# Patient Record
Sex: FEMALE | Race: WHITE | NOT HISPANIC OR LATINO | Employment: UNEMPLOYED | URBAN - METROPOLITAN AREA
[De-identification: names, ages, dates, MRNs, and addresses within clinical notes are randomized per-mention and may not be internally consistent; named-entity substitution may affect disease eponyms.]

---

## 2017-02-13 ENCOUNTER — TELEPHONE (OUTPATIENT)
Dept: FAMILY MEDICINE | Facility: CLINIC | Age: 22
End: 2017-02-13

## 2017-02-13 NOTE — TELEPHONE ENCOUNTER
Reason for call:  Patient reporting a symptom    Symptom or request: allergies, hives    Duration (how long have symptoms been present): yesterday     Have you been treated for this before? No    Additional comments: pt calling stating she is having a allergy attack and has broken out in hives. She has tried benadryl allergy and itching cream and it hasn't helped.     Phone Number patient can be reached at:  Home number on file [phone] (home)    Best Time:  any    Can we leave a detailed message on this number:  YES    Call taken on 2/13/2017 at 2:17 PM by Mariah Manriquez

## 2017-02-13 NOTE — TELEPHONE ENCOUNTER
Pt was given information below from provider   Pt verbalized understanding and had no further questions at this time.   Encounter closed.   Natasha WATTS RN

## 2017-02-13 NOTE — TELEPHONE ENCOUNTER
Pt thinks she is having an allergic action to ibuprofen  Please advise   Thank you     Ibuprofen -1 pill -  last dose night before reaction (Saturday 2/11/17)  Sunday 2/12/17 broke out around 10 am with an itchy scalp.   Symptoms: rash all over body, itchy, burning in some areas.   Hx: 1 time before but it was from cherries  Denies: SOB, pain in chest or arm.     Pt is using benadryl - last dose 5 hours ago (9:30am)  - 1 pill 25 mg each.

## 2017-02-18 ENCOUNTER — HOSPITAL ENCOUNTER (EMERGENCY)
Facility: CLINIC | Age: 22
Discharge: HOME | End: 2017-02-18
Attending: PHYSICIAN ASSISTANT | Admitting: PHYSICIAN ASSISTANT
Payer: COMMERCIAL

## 2017-02-18 VITALS
HEIGHT: 63 IN | SYSTOLIC BLOOD PRESSURE: 111 MMHG | HEART RATE: 98 BPM | DIASTOLIC BLOOD PRESSURE: 67 MMHG | TEMPERATURE: 98.3 F | BODY MASS INDEX: 30.12 KG/M2 | OXYGEN SATURATION: 98 % | RESPIRATION RATE: 16 BRPM | WEIGHT: 170 LBS

## 2017-02-18 DIAGNOSIS — R05.9 COUGH: ICD-10-CM

## 2017-02-18 PROCEDURE — 99213 OFFICE O/P EST LOW 20 MIN: CPT | Performed by: PHYSICIAN ASSISTANT

## 2017-02-18 PROCEDURE — 99212 OFFICE O/P EST SF 10 MIN: CPT

## 2017-02-18 RX ORDER — BENZONATATE 100 MG/1
100 CAPSULE ORAL 3 TIMES DAILY PRN
Qty: 42 CAPSULE | Refills: 0 | Status: SHIPPED | OUTPATIENT
Start: 2017-02-18 | End: 2017-07-21

## 2017-02-18 RX ORDER — PREDNISONE 20 MG/1
TABLET ORAL
Qty: 10 TABLET | Refills: 0 | Status: SHIPPED | OUTPATIENT
Start: 2017-02-18 | End: 2017-03-28

## 2017-02-18 NOTE — ED AVS SNAPSHOT
[facility] Emergency Department    [address] [address]    Phone:  [phone]    Fax:  [phone]                                       Deborah Swartz   MRN: [id number]    Department:  [facility] Emergency Department   Date of Visit:  2/18/2017           After Visit Summary Signature Page     I have received my discharge instructions, and my questions have been answered. I have discussed any challenges I see with this plan with the nurse or doctor.    ..........................................................................................................................................  Patient/Patient Representative Signature      ..........................................................................................................................................  Patient Representative Print Name and Relationship to Patient    ..................................................               ................................................  Date                                            Time    ..........................................................................................................................................  Reviewed by Signature/Title    ...................................................              ..............................................  Date                                                            Time

## 2017-02-18 NOTE — ED AVS SNAPSHOT
[facility] Emergency Department    [address] [address]    Phone:  [phone]    Fax:  [phone]                                       Deborah Swartz   MRN: [id number]    Department:  [facility] Emergency Department   Date of Visit:  2/18/2017           Patient Information     Date Of Birth          1995        Your diagnoses for this visit were:     Cough        You were seen by Reina Lindsay PA-C.      Follow-up Information     Follow up with CELIA Mcbride MD Today.    Specialty:  Family Practice    Why:  As needed, If symptoms worsen    Contact information:    [facility]  [address] [address] [address]  [phone]        Discharge References/Attachments     BRONCHITIS, ACUTE (ENGLISH)      24 Hour Appointment Hotline       To make an appointment at any [facility] clinic, call [phone]). If you don't have a family doctor or clinic, we will help you find one. [facility] clinics are conveniently located to serve the needs of you and your family.             Review of your medicines      START taking        Dose / Directions Last dose taken    benzonatate 100 MG capsule   Commonly known as:  TESSALON   Dose:  100 mg   Quantity:  42 capsule        Take 1 capsule (100 mg) by mouth 3 times daily as needed for cough   Refills:  0        predniSONE 20 MG tablet   Commonly known as:  DELTASONE   Quantity:  10 tablet        Take two tablets (= 40mg) each day for 5 (five) days   Refills:  0          Our records show that you are taking the medicines listed below. If these are incorrect, please call your family doctor or clinic.        Dose / Directions Last dose taken    albuterol 108 (90 BASE) MCG/ACT Inhaler   Commonly known as:  PROAIR HFA/PROVENTIL HFA/VENTOLIN HFA   Dose:  2 puff   Quantity:  1 Inhaler        Inhale 2 puffs into the lungs every 4 hours as needed for shortness of breath / dyspnea or wheezing   Refills:  1         "cyclobenzaprine 10 MG tablet   Commonly known as:  FLEXERIL   Dose:  10 mg   Quantity:  40 tablet        Take 1 tablet (10 mg) by mouth 3 times daily as needed for muscle spasms   Refills:  01        diclofenac 75 MG EC tablet   Commonly known as:  VOLTAREN   Dose:  75 mg   Quantity:  60 tablet        Take 1 tablet (75 mg) by mouth 2 times daily   Refills:  1        sulfamethoxazole-trimethoprim 800-160 MG per tablet   Commonly known as:  BACTRIM DS/SEPTRA DS   Dose:  1 tablet   Quantity:  14 tablet        Take 1 tablet by mouth 2 times daily   Refills:  0        TYLENOL PO   Dose:  500 mg        Take 500 mg by mouth every 8 hours as needed for mild pain or fever   Refills:  0                Prescriptions were sent or printed at these locations (2 Prescriptions)                   [facility] [address] [place] [address] [address]    [address] [facility] [address]    Telephone:  [phone]   Fax:  [phone]   Hours:                  E-Prescribed (2 of 2)         predniSONE (DELTASONE) 20 MG tablet               benzonatate (TESSALON) 100 MG capsule                Orders Needing Specimen Collection     None      Pending Results     No orders found from 2/16/2017 to 2/19/2017.            Pending Culture Results     No orders found from 2/16/2017 to 2/19/2017.             Test Results from your hospital stay            Thank you for choosing [facility]       Thank you for choosing [facility] for your care. Our goal is always to provide you with excellent care. Hearing back from our patients is one way we can continue to improve our services. Please take a few minutes to complete the written survey that you may receive in the mail after you visit with us. Thank you!        [employer] Information     [employer] lets you send messages to your doctor, view your test results, renew your prescriptions, schedule appointments and more. To sign up, go to [email or web address] . Click on \"Log in\" on the left side of " "the screen, which will take you to the Welcome page. Then click on \"Sign up Now\" on the right side of the page.     You will be asked to enter the access code listed below, as well as some personal information. Please follow the directions to create your username and password.     Your access code is: [email or web address]  Expires: 3/28/2017 12:10 PM     Your access code will  in 90 days. If you need help or a new code, please call your [facility] clinic or [phone].        Care EveryWhere ID     This is your Care EveryWhere ID. This could be used by other organizations to access your [facility] medical records  [id number]        After Visit Summary       This is your record. Keep this with you and show to your community pharmacist(s) and doctor(s) at your next visit.                  "

## 2017-02-18 NOTE — ED PROVIDER NOTES
History     Chief Complaint   Patient presents with     Cough     off and on X2 weeks     HPI  Deborah Swartz is a 21 year old female who presents to  with concerns over cough.  Patient states that she initially became ill approximately two weeks ago with nasal congestion with cough.  She states she was initially improving until she had what she suspected was an allergic reaction to doctor pepper cola one week ago.  She developed hives which resolved with benadryl, epsom salt soaks one week ago and since then cough has been more severe.  She denies any fever, chills, myalgias, shortness of breath or wheezing, however does complain of some chest tightness.  She does have a history of infrequent asthma and has attempting to treat her symptoms with albuterol inhaler twice daily for the last several days. She is a pack a week smoker for the last three years.      Past Medical History   Diagnosis Date     Papanicolaou smear of cervix with atypical squamous cells of undetermined significance (ASC-US) 9/22/16          No current facility-administered medications on file prior to encounter.   Current Outpatient Prescriptions on File Prior to Encounter:  Acetaminophen (TYLENOL PO) Take 500 mg by mouth every 8 hours as needed for mild pain or fever   sulfamethoxazole-trimethoprim (BACTRIM DS/SEPTRA DS) 800-160 MG per tablet Take 1 tablet by mouth 2 times daily   albuterol (PROAIR HFA, PROVENTIL HFA, VENTOLIN HFA) 108 (90 BASE) MCG/ACT inhaler Inhale 2 puffs into the lungs every 4 hours as needed for shortness of breath / dyspnea or wheezing   diclofenac (VOLTAREN) 75 MG EC tablet Take 1 tablet (75 mg) by mouth 2 times daily   cyclobenzaprine (FLEXERIL) 10 MG tablet Take 1 tablet (10 mg) by mouth 3 times daily as needed for muscle spasms      I have reviewed the Medications, Allergies, Past Medical and Surgical History, and Social History in the Epic system.    Review of Systems  CONSTITUTIONAL:NEGATIVE for fever, chills,  "change in weight  INTEGUMENTARY/SKIN: POSITIVE for resolved rash NEGATIVE for current skin complaints   EYES: NEGATIVE for vision changes or irritation  ENT/MOUTH: POSITIVE for nasal congestion and NEGATIVE for sore throat,ear pain   RESP:POSITIVE for cough and chest tightness NEGATIVE for shortness of breath or wheezing   GI: NEGATIVE for abdominal pain, diarrhea, nausea and vomiting  Physical Exam   /67  Pulse 98  Temp 98.3  F (36.8  C) (Oral)  Resp 16  Ht 1.6 m (5' 3\")  Wt 77.1 kg (170 lb)  LMP 02/12/2017  SpO2 98%  BMI 30.11 kg/m2  Physical Exam   GENERAL APPEARANCE: healthy, alert and no distress  EYES: EOMI,  PERRL, conjunctiva clear  HENT: ear canals and TM's normal.  Nasal mucosa edematous.  Posterior pharynx non-erythematous,non-edematous without exudate.    NECK: supple, nontender, no lymphadenopathy  RESP: lungs clear to auscultation - no rales, rhonchi or wheezes  CV: regular rates and rhythm, normal S1 S2, no murmur noted  SKIN: no suspicious lesions or rashes  ED Course     ED Course     Procedures        Critical Care time:  none             Labs Ordered and Resulted from Time of ED Arrival Up to the Time of Departure from the ED - No data to display    Assessments & Plan (with Medical Decision Making)     I have reviewed the nursing notes.    I have reviewed the findings, diagnosis, plan and need for follow up with the patient.    Discharge Medication List as of 2/18/2017  2:35 PM      START taking these medications    Details   predniSONE (DELTASONE) 20 MG tablet Take two tablets (= 40mg) each day for 5 (five) days, Disp-10 tablet, R-0, E-Prescribe      benzonatate (TESSALON) 100 MG capsule Take 1 capsule (100 mg) by mouth 3 times daily as needed for cough, Disp-42 capsule, R-0, E-Prescribe           Final diagnoses:   Cough     21 year old female presents to  with concern over two week history of cough.  She had stable vital signs upon arrival.  Physical exam findings as described above.  " Differential for her symptoms is broad and includes viral URI, bronchitis, sinusitis, allergy mediated reaction, asthma exacerbation.  I do not suspect pneumonia, pertussis, PE and will defer further evaluation at this time.  Patient was discharged home with symptomatic treatment with prednisone and tessalon.  Shew as instructed to continue albuterol inhaler as needed.  Follow up with PCP if no improvement in 5-7 days.  Worrisome reasons to return to ER/UC sooner discussed.      Disclaimer: This note consists of symbols derived from keyboarding, dictation, and/or voice recognition software. As a result, there may be errors in the script that have gone undetected.  Please consider this when interpreting information found in the chart.    2/18/2017   [facility] EMERGENCY DEPARTMENT     Reina Lindsay PA-C  02/26/17 1034

## 2017-03-13 ENCOUNTER — OFFICE VISIT (OUTPATIENT)
Dept: FAMILY MEDICINE | Facility: CLINIC | Age: 22
End: 2017-03-13
Payer: COMMERCIAL

## 2017-03-13 VITALS
HEART RATE: 80 BPM | SYSTOLIC BLOOD PRESSURE: 104 MMHG | HEIGHT: 63 IN | TEMPERATURE: 98.8 F | BODY MASS INDEX: 30.3 KG/M2 | DIASTOLIC BLOOD PRESSURE: 64 MMHG | WEIGHT: 171 LBS

## 2017-03-13 DIAGNOSIS — F17.200 TOBACCO USE DISORDER: ICD-10-CM

## 2017-03-13 DIAGNOSIS — J20.9 BRONCHITIS WITH BRONCHOSPASM: Primary | ICD-10-CM

## 2017-03-13 DIAGNOSIS — J45.20 INTERMITTENT ASTHMA, UNCOMPLICATED: ICD-10-CM

## 2017-03-13 PROCEDURE — 99214 OFFICE O/P EST MOD 30 MIN: CPT | Performed by: FAMILY MEDICINE

## 2017-03-13 RX ORDER — AZITHROMYCIN 250 MG/1
TABLET, FILM COATED ORAL
Qty: 6 TABLET | Refills: 0 | Status: SHIPPED | OUTPATIENT
Start: 2017-03-13 | End: 2017-03-28

## 2017-03-13 RX ORDER — PREDNISONE 20 MG/1
20 TABLET ORAL 2 TIMES DAILY
Qty: 14 TABLET | Refills: 0 | Status: SHIPPED | OUTPATIENT
Start: 2017-03-13 | End: 2017-03-20

## 2017-03-13 RX ORDER — ALBUTEROL SULFATE 90 UG/1
2 AEROSOL, METERED RESPIRATORY (INHALATION) EVERY 4 HOURS PRN
Qty: 1 INHALER | Refills: 1 | Status: SHIPPED | OUTPATIENT
Start: 2017-03-13 | End: 2018-04-13

## 2017-03-13 RX ORDER — INHALER, ASSIST DEVICES
1 SPACER (EA) MISCELLANEOUS EVERY 4 HOURS PRN
Qty: 1 EACH | Refills: 0 | Status: SHIPPED | OUTPATIENT
Start: 2017-03-13 | End: 2018-02-12

## 2017-03-13 NOTE — PROGRESS NOTES
Subjective:  Deborah Swartz is a 21 year old female   Chief Complaint   Patient presents with     URI     X 1 month ongoing cough pt. was seen in  2/18/2017.     At that time she was given a short rest of prednisone and encouraged to use her inhaler. It helped temporarily but did not clear up her symptoms completely and she has had an ongoing cough ever since. It seems worse in the last few days so that the cough is disrupting her sleep. Occasionally she does have some productive sputum. Unfortunately, she has not been able to quit smoking        Encounter Diagnoses   Name Primary?     Bronchitis with bronchospasm Yes     Intermittent asthma, uncomplicated      Tobacco use disorder         ROS: 5 point ROS neg other than the symptoms noted above in the HPI.      Medical, surgical, social, and family histories, medications and allergies reviewed and updated.    Objective:  Exam:    GENERAL APPEARANCE ADULT: Alert, no acute distress  EYES: PERRL, EOM normal, conjunctiva and lids normal  HENT: Ears and TMs normal, oral mucosa and posterior oropharynx normal  NECK: No adenopathy,masses or thyromegaly  RESP: expiratory wheezes, mild  CV: normal rate, regular rhythm, no murmur or gallop      ASSESSMENT:  1. Bronchitis with bronchospasm    2. Intermittent asthma, uncomplicated    3. Tobacco use disorder        PLAN:  Orders Placed This Encounter     Asthma Action Plan (AAP)     predniSONE (DELTASONE) 20 MG tablet     azithromycin (ZITHROMAX) 250 MG tablet     albuterol (PROAIR HFA/PROVENTIL HFA/VENTOLIN HFA) 108 (90 BASE) MCG/ACT Inhaler     Spacer/Aero-Holding Chambers (OPTIHALER) VADIM   Discussed how to take the medication(s), expected outcomes, potential side effects.     Explained that the optihaler may improve the effectiveness of her inhaler    Patient Instructions   Take the prednisone with food to avoid stomach distress. If it causes significant insomnia, stop early and just go with the albuterol.

## 2017-03-13 NOTE — PATIENT INSTRUCTIONS
Take the prednisone with food to avoid stomach distress. If it causes significant insomnia, stop early and just go with the albuterol.

## 2017-03-13 NOTE — MR AVS SNAPSHOT
"              After Visit Summary   3/13/2017    Deborah Swartz    MRN: [id number]           Patient Information     Date Of Birth          1995        Visit Information        Provider Department      3/13/2017 8:40 AM CELIA Mcbride MD [facility]        Today's Diagnoses     Bronchitis with bronchospasm    -  1    Intermittent asthma, uncomplicated          Care Instructions    Take the prednisone with food to avoid stomach distress. If it causes significant insomnia, stop early and just go with the albuterol.         Follow-ups after your visit        Who to contact     If you have questions or need follow up information about today's clinic visit or your schedule please contact [facility] directly at [phone].  Normal or non-critical lab and imaging results will be communicated to you by MyChart, letter or phone within 4 business days after the clinic has received the results. If you do not hear from us within 7 days, please contact the clinic through [employer] or phone. If you have a critical or abnormal lab result, we will notify you by phone as soon as possible.  Submit refill requests through [employer] or call your pharmacy and they will forward the refill request to us. Please allow 3 business days for your refill to be completed.          Additional Information About Your Visit        MyChart Information     [employer] lets you send messages to your doctor, view your test results, renew your prescriptions, schedule appointments and more. To sign up, go to [email or web address] . Click on \"Log in\" on the left side of the screen, which will take you to the Welcome page. Then click on \"Sign up Now\" on the right side of the page.     You will be asked to enter the access code listed below, as well as some personal information. Please follow the directions to create your username and password.     Your access code is: [email or web address]  Expires: 3/28/2017  1:10 PM     Your " "access code will  in 90 days. If you need help or a new code, please call your [facility] clinic or [phone].        Care EveryWhere ID     This is your Care EveryWhere ID. This could be used by other organizations to access your [facility] medical records  [id number]        Your Vitals Were     Pulse Temperature Height Last Period BMI (Body Mass Index)       80 98.8  F (37.1  C) (Tympanic) 5' 3\" (1.6 m) 2017 30.29 kg/m2        Blood Pressure from Last 3 Encounters:   17 104/64   17 111/67   16 109/77    Weight from Last 3 Encounters:   17 171 lb (77.6 kg)   17 170 lb (77.1 kg)   16 162 lb (73.5 kg)              Today, you had the following     No orders found for display         Today's Medication Changes          These changes are accurate as of: 3/13/17  9:07 AM.  If you have any questions, ask your nurse or doctor.               Start taking these medicines.        Dose/Directions    azithromycin 250 MG tablet   Commonly known as:  ZITHROMAX   Used for:  Bronchitis with bronchospasm   Started by:  CELIA Mcbride MD        2 tabs the first day and one daily for 4 days   Quantity:  6 tablet   Refills:  0         These medicines have changed or have updated prescriptions.        Dose/Directions    * predniSONE 20 MG tablet   Commonly known as:  DELTASONE   This may have changed:  Another medication with the same name was added. Make sure you understand how and when to take each.        Take two tablets (= 40mg) each day for 5 (five) days   Quantity:  10 tablet   Refills:  0       * predniSONE 20 MG tablet   Commonly known as:  DELTASONE   This may have changed:  You were already taking a medication with the same name, and this prescription was added. Make sure you understand how and when to take each.   Used for:  Bronchitis with bronchospasm   Changed by:  CELIA Mcbride MD        Dose:  20 mg   Take 1 tablet (20 mg) by mouth 2 times daily for 7 days   Quantity:  14 " tablet   Refills:  0       * Notice:  This list has 2 medication(s) that are the same as other medications prescribed for you. Read the directions carefully, and ask your doctor or other care provider to review them with you.      Stop taking these medicines if you haven't already. Please contact your care team if you have questions.     cyclobenzaprine 10 MG tablet   Commonly known as:  FLEXERIL   Stopped by:  CELIA Mcbride MD                Where to get your medicines      These medications were sent to [facility] [address]   [address] [facility] [address]     Phone:  [phone]     albuterol 108 (90 BASE) MCG/ACT Inhaler    azithromycin 250 MG tablet    predniSONE 20 MG tablet                Primary Care Provider Office Phone # Fax #    CELIA Mcbride [phone]       [address] [address]        Thank you!     Thank you for choosing [facility]  for your care. Our goal is always to provide you with excellent care. Hearing back from our patients is one way we can continue to improve our services. Please take a few minutes to complete the written survey that you may receive in the mail after your visit with us. Thank you!             Your Updated Medication List - Protect others around you: Learn how to safely use, store and throw away your medicines at www.disposemymeds.org.          This list is accurate as of: 3/13/17  9:07 AM.  Always use your most recent med list.                   Brand Name Dispense Instructions for use    albuterol 108 (90 BASE) MCG/ACT Inhaler    PROAIR HFA/PROVENTIL HFA/VENTOLIN HFA    1 Inhaler    Inhale 2 puffs into the lungs every 4 hours as needed for shortness of breath / dyspnea or wheezing       azithromycin 250 MG tablet    ZITHROMAX    6 tablet    2 tabs the first day and one daily for 4 days       benzonatate 100 MG capsule    TESSALON    42 capsule     Take 1 capsule (100 mg) by mouth 3 times daily as needed for cough       * predniSONE 20 MG tablet    DELTASONE    10 tablet    Take two tablets (= 40mg) each day for 5 (five) days       * predniSONE 20 MG tablet    DELTASONE    14 tablet    Take 1 tablet (20 mg) by mouth 2 times daily for 7 days       TYLENOL PO      Take 500 mg by mouth every 8 hours as needed for mild pain or fever Reported on 3/13/2017       * Notice:  This list has 2 medication(s) that are the same as other medications prescribed for you. Read the directions carefully, and ask your doctor or other care provider to review them with you.

## 2017-03-13 NOTE — NURSING NOTE
"Chief Complaint   Patient presents with     URI     X 1 month ongoing cough pt. was seen in  2/18/2017.        Initial /64 (BP Location: Right arm, Patient Position: Chair, Cuff Size: Adult Regular)  Pulse 80  Temp 98.8  F (37.1  C) (Tympanic)  Ht 5' 3\" (1.6 m)  Wt 171 lb (77.6 kg)  LMP 02/12/2017  BMI 30.29 kg/m2 Estimated body mass index is 30.29 kg/(m^2) as calculated from the following:    Height as of this encounter: 5' 3\" (1.6 m).    Weight as of this encounter: 171 lb (77.6 kg).  Medication Reconciliation: complete     Claudia Stallings, CMA      "

## 2017-03-14 ASSESSMENT — ASTHMA QUESTIONNAIRES: ACT_TOTALSCORE: 23

## 2017-03-28 ENCOUNTER — OFFICE VISIT (OUTPATIENT)
Dept: FAMILY MEDICINE | Facility: CLINIC | Age: 22
End: 2017-03-28
Payer: COMMERCIAL

## 2017-03-28 ENCOUNTER — RADIANT APPOINTMENT (OUTPATIENT)
Dept: GENERAL RADIOLOGY | Facility: CLINIC | Age: 22
End: 2017-03-28
Attending: FAMILY MEDICINE
Payer: COMMERCIAL

## 2017-03-28 VITALS — BODY MASS INDEX: 30.9 KG/M2 | WEIGHT: 174.4 LBS | TEMPERATURE: 98.9 F | HEIGHT: 63 IN

## 2017-03-28 DIAGNOSIS — R05.9 COUGH: ICD-10-CM

## 2017-03-28 DIAGNOSIS — J45.21 INTERMITTENT ASTHMA, WITH ACUTE EXACERBATION: ICD-10-CM

## 2017-03-28 DIAGNOSIS — J20.9 ACUTE BRONCHITIS WITH SYMPTOMS GREATER THAN 10 DAYS: Primary | ICD-10-CM

## 2017-03-28 PROCEDURE — 99214 OFFICE O/P EST MOD 30 MIN: CPT | Performed by: FAMILY MEDICINE

## 2017-03-28 PROCEDURE — 71020 XR CHEST 2 VW: CPT

## 2017-03-28 RX ORDER — LEVOFLOXACIN 500 MG/1
500 TABLET, FILM COATED ORAL DAILY
Qty: 10 TABLET | Refills: 0 | Status: SHIPPED | OUTPATIENT
Start: 2017-03-28 | End: 2018-02-12

## 2017-03-28 RX ORDER — PREDNISONE 20 MG/1
TABLET ORAL
Qty: 14 TABLET | Refills: 0 | Status: SHIPPED | OUTPATIENT
Start: 2017-03-28 | End: 2017-07-21

## 2017-03-28 ASSESSMENT — ANXIETY QUESTIONNAIRES
IF YOU CHECKED OFF ANY PROBLEMS ON THIS QUESTIONNAIRE, HOW DIFFICULT HAVE THESE PROBLEMS MADE IT FOR YOU TO DO YOUR WORK, TAKE CARE OF THINGS AT HOME, OR GET ALONG WITH OTHER PEOPLE: NOT DIFFICULT AT ALL
3. WORRYING TOO MUCH ABOUT DIFFERENT THINGS: NOT AT ALL
2. NOT BEING ABLE TO STOP OR CONTROL WORRYING: NOT AT ALL
6. BECOMING EASILY ANNOYED OR IRRITABLE: SEVERAL DAYS
1. FEELING NERVOUS, ANXIOUS, OR ON EDGE: NOT AT ALL
GAD7 TOTAL SCORE: 1
7. FEELING AFRAID AS IF SOMETHING AWFUL MIGHT HAPPEN: NOT AT ALL
5. BEING SO RESTLESS THAT IT IS HARD TO SIT STILL: NOT AT ALL

## 2017-03-28 ASSESSMENT — PATIENT HEALTH QUESTIONNAIRE - PHQ9: 5. POOR APPETITE OR OVEREATING: NOT AT ALL

## 2017-03-28 NOTE — MR AVS SNAPSHOT
"              After Visit Summary   3/28/2017    Deborah Swartz    MRN: [id number]           Patient Information     Date Of Birth          1995        Visit Information        Provider Department      3/28/2017 2:20 PM Reed, CELIA Dunbar MD [facility]        Today's Diagnoses     Cough    -  1    Acute bronchitis with symptoms greater than 10 days          Care Instructions    Take the prednisone for another 7 days. Levaquin for 10 days, albuterol as needed.        Follow-ups after your visit        Who to contact     If you have questions or need follow up information about today's clinic visit or your schedule please contact [facility] directly at [phone].  Normal or non-critical lab and imaging results will be communicated to you by MyChart, letter or phone within 4 business days after the clinic has received the results. If you do not hear from us within 7 days, please contact the clinic through Alignablehart or phone. If you have a critical or abnormal lab result, we will notify you by phone as soon as possible.  Submit refill requests through [email or web address] or call your pharmacy and they will forward the refill request to us. Please allow 3 business days for your refill to be completed.          Additional Information About Your Visit        MyChart Information     [email or web address] lets you send messages to your doctor, view your test results, renew your prescriptions, schedule appointments and more. To sign up, go to [email or web address] . Click on \"Log in\" on the left side of the screen, which will take you to the Welcome page. Then click on \"Sign up Now\" on the right side of the page.     You will be asked to enter the access code listed below, as well as some personal information. Please follow the directions to create your username and password.     Your access code is: [email or web address]  Expires: 2017  3:05 PM     Your access code will  in 90 days. If you need help or a new " "code, please call your [place] clinic or [phone].        Care EveryWhere ID     This is your Care EveryWhere ID. This could be used by other organizations to access your [place] medical records  [id number]        Your Vitals Were     Temperature Height BMI (Body Mass Index)             98.9  F (37.2  C) (Tympanic) 5' 3\" (1.6 m) 30.89 kg/m2          Blood Pressure from Last 3 Encounters:   03/13/17 104/64   02/18/17 111/67   12/28/16 109/77    Weight from Last 3 Encounters:   03/28/17 174 lb 6.4 oz (79.1 kg)   03/13/17 171 lb (77.6 kg)   02/18/17 170 lb (77.1 kg)                 Today's Medication Changes          These changes are accurate as of: 3/28/17  3:13 PM.  If you have any questions, ask your nurse or doctor.               Start taking these medicines.        Dose/Directions    levofloxacin 500 MG tablet   Commonly known as:  LEVAQUIN   Used for:  Acute bronchitis with symptoms greater than 10 days   Started by:  CELIA Mcbride MD        Dose:  500 mg   Take 1 tablet (500 mg) by mouth daily   Quantity:  10 tablet   Refills:  0         These medicines have changed or have updated prescriptions.        Dose/Directions    predniSONE 20 MG tablet   Commonly known as:  DELTASONE   This may have changed:  additional instructions   Used for:  Acute bronchitis with symptoms greater than 10 days   Changed by:  CELIA Mcbride MD        Take two tablets (= 40mg) each day for 7 days   Quantity:  14 tablet   Refills:  0            Where to get your medicines      These medications were sent to [facility] [address] [place] [address] [address]   [address] [facility] [address]     Phone:  [phone]     levofloxacin 500 MG tablet    predniSONE 20 MG tablet                Primary Care Provider Office Phone # Fax #    CELIA Mcbride [phone]       [address] [address]        Thank you!     Thank you for choosing [facility] [place]  for your care. Our goal is always to provide you with excellent care. Hearing back from our patients is one way we can continue to improve our services. Please take a few minutes to complete the written survey that you may receive in the mail after your visit with us. Thank you!             Your Updated Medication List - Protect others around you: Learn how to safely use, store and throw away your medicines at www.disposemymeds.org.          This list is accurate as of: 3/28/17  3:13 PM.  Always use your most recent med list.                   Brand Name Dispense Instructions for use    albuterol 108 (90 BASE) MCG/ACT Inhaler    PROAIR HFA/PROVENTIL HFA/VENTOLIN HFA    1 Inhaler    Inhale 2 puffs into the lungs every 4 hours as needed for shortness of breath / dyspnea or wheezing       benzonatate 100 MG capsule    TESSALON    42 capsule    Take 1 capsule (100 mg) by mouth 3 times daily as needed for cough       levofloxacin 500 MG tablet    LEVAQUIN    10 tablet    Take 1 tablet (500 mg) by mouth daily       OPTIHALER Marie     1 each    1 each every 4 hours as needed       predniSONE 20 MG tablet    DELTASONE    14 tablet    Take two tablets (= 40mg) each day for 7 days       TYLENOL PO      Take 500 mg by mouth every 8 hours as needed for mild pain or fever Reported on 3/13/2017

## 2017-03-28 NOTE — PROGRESS NOTES
Subjective:  Deborah Swartz is a 21 year old female   Chief Complaint   Patient presents with     Patient Request     ongoing cough pt. was seen on 3/13/2017 with Dr. Mcbride pt. states its a little better but not gone. worse at night.      She was seen 2 weeks ago with an episode of bronchitis and treated with a burst of oral prednisone, the albuterol inhaler and Zithromax. She seemed to get somewhat better while she was on the prednisone but then the cough resumed once she was off the prednisone. Now it is again bothering her when she lies down at night and the cough has become productive of some yellow sputum        Encounter Diagnoses   Name Primary?     Acute bronchitis with symptoms greater than 10 days Yes     Intermittent asthma, with acute exacerbation      Cough        ROS:General: No change in weight, sleep or appetite.  Normal energy.  No fever or chills  ENT: No problems with ears, nose or throat.  No difficulty swallowing.  Resp: As above, otherwise negative  CV: No chest pains or palpitations  GI: No nausea, vomiting,  heartburn, abdominal pain, diarrhea, constipation or change in bowel habits  : No urinary frequency or dysuria, bladder or kidney problems    Medical, surgical, social, and family histories, medications and allergies reviewed and updated.    Objective:  Exam:    GENERAL APPEARANCE ADULT: Alert, no acute distress  EYES: PERRL, EOM normal, conjunctiva and lids normal  HENT: Ears and TMs normal, oral mucosa and posterior oropharynx normal  NECK: No adenopathy,masses or thyromegaly  RESP: Scattered wheezes on expiration  CV: normal rate, regular rhythm, no murmur or gallop  Chest x-ray negative    ASSESSMENT:  1. Acute bronchitis with symptoms greater than 10 days    2. Intermittent asthma, with acute exacerbation    3. Cough        PLAN:  Orders Placed This Encounter     XR Chest 2 Views     predniSONE (DELTASONE) 20 MG tablet     levofloxacin (LEVAQUIN) 500 MG tablet       Patient  Instructions   Take the prednisone for another 7 days. Levaquin for 10 days, albuterol as needed.

## 2017-03-29 ASSESSMENT — ANXIETY QUESTIONNAIRES: GAD7 TOTAL SCORE: 1

## 2017-03-29 ASSESSMENT — PATIENT HEALTH QUESTIONNAIRE - PHQ9: SUM OF ALL RESPONSES TO PHQ QUESTIONS 1-9: 6

## 2017-07-21 ENCOUNTER — OFFICE VISIT (OUTPATIENT)
Dept: FAMILY MEDICINE | Facility: CLINIC | Age: 22
End: 2017-07-21
Payer: COMMERCIAL

## 2017-07-21 VITALS
OXYGEN SATURATION: 98 % | DIASTOLIC BLOOD PRESSURE: 80 MMHG | TEMPERATURE: 99 F | HEART RATE: 88 BPM | BODY MASS INDEX: 31.35 KG/M2 | SYSTOLIC BLOOD PRESSURE: 112 MMHG | WEIGHT: 177 LBS

## 2017-07-21 DIAGNOSIS — R30.0 DYSURIA: Primary | ICD-10-CM

## 2017-07-21 LAB
ALBUMIN UR-MCNC: NEGATIVE MG/DL
APPEARANCE UR: CLEAR
BACTERIA #/AREA URNS HPF: NEGATIVE /HPF
BILIRUB UR QL STRIP: NEGATIVE
COLOR UR AUTO: YELLOW
GLUCOSE UR STRIP-MCNC: NEGATIVE MG/DL
HGB UR QL STRIP: ABNORMAL
KETONES UR STRIP-MCNC: NEGATIVE MG/DL
LEUKOCYTE ESTERASE UR QL STRIP: NEGATIVE
NITRATE UR QL: NEGATIVE
NON-SQ EPI CELLS #/AREA URNS LPF: ABNORMAL /LPF
PH UR STRIP: 5.5 PH (ref 5–7)
RBC #/AREA URNS AUTO: ABNORMAL /HPF (ref 0–2)
SP GR UR STRIP: <=1.005 (ref 1–1.03)
URN SPEC COLLECT METH UR: ABNORMAL
UROBILINOGEN UR STRIP-ACNC: 0.2 EU/DL (ref 0.2–1)
WBC #/AREA URNS AUTO: NEGATIVE /HPF (ref 0–2)

## 2017-07-21 PROCEDURE — 99213 OFFICE O/P EST LOW 20 MIN: CPT | Performed by: FAMILY MEDICINE

## 2017-07-21 PROCEDURE — 81001 URINALYSIS AUTO W/SCOPE: CPT | Performed by: FAMILY MEDICINE

## 2017-07-21 RX ORDER — SULFAMETHOXAZOLE/TRIMETHOPRIM 800-160 MG
1 TABLET ORAL 2 TIMES DAILY
Qty: 14 TABLET | Refills: 0 | Status: SHIPPED | OUTPATIENT
Start: 2017-07-21 | End: 2018-02-12

## 2017-07-21 ASSESSMENT — PAIN SCALES - GENERAL: PAINLEVEL: MILD PAIN (3)

## 2017-07-21 NOTE — NURSING NOTE
"Chief Complaint   Patient presents with     UTI     painful urination, frequency x 2 days       Initial /80 (BP Location: Right arm, Patient Position: Chair, Cuff Size: Adult Large)  Pulse 88  Temp 99  F (37.2  C) (Tympanic)  Wt 177 lb (80.3 kg)  LMP 06/21/2017  SpO2 98%  Breastfeeding? No  BMI 31.35 kg/m2 Estimated body mass index is 31.35 kg/(m^2) as calculated from the following:    Height as of 3/28/17: 5' 3\" (1.6 m).    Weight as of this encounter: 177 lb (80.3 kg).  Medication Reconciliation: complete   Shirin Rocha CMA       "

## 2017-07-21 NOTE — MR AVS SNAPSHOT
"              After Visit Summary   2017    Deborah Swartz    MRN: [id number]           Patient Information     Date Of Birth          1995        Visit Information        Provider Department      2017 2:20 PM Sudheer Andrade MD [facility]        Today's Diagnoses     Dysuria    -  1       Follow-ups after your visit        Who to contact     If you have questions or need follow up information about today's clinic visit or your schedule please contact [facility] directly at [phone].  Normal or non-critical lab and imaging results will be communicated to you by MyChart, letter or phone within 4 business days after the clinic has received the results. If you do not hear from us within 7 days, please contact the clinic through YASSSUhart or phone. If you have a critical or abnormal lab result, we will notify you by phone as soon as possible.  Submit refill requests through Fanitics or call your pharmacy and they will forward the refill request to us. Please allow 3 business days for your refill to be completed.          Additional Information About Your Visit        MyChart Information     Fanitics lets you send messages to your doctor, view your test results, renew your prescriptions, schedule appointments and more. To sign up, go to [email or web address] . Click on \"Log in\" on the left side of the screen, which will take you to the Welcome page. Then click on \"Sign up Now\" on the right side of the page.     You will be asked to enter the access code listed below, as well as some personal information. Please follow the directions to create your username and password.     Your access code is: [email or web address]  Expires: 10/19/2017  3:40 PM     Your access code will  in 90 days. If you need help or a new code, please call your [facility] or [phone].        Care EveryWhere ID     This is your Care EveryWhere ID. This could be used by other " organizations to access your [place] medical records  [id number]        Your Vitals Were     Pulse Temperature Last Period Pulse Oximetry Breastfeeding? BMI (Body Mass Index)    88 99  F (37.2  C) (Tympanic) 06/21/2017 98% No 31.35 kg/m2       Blood Pressure from Last 3 Encounters:   07/21/17 112/80   03/13/17 104/64   02/18/17 111/67    Weight from Last 3 Encounters:   07/21/17 177 lb (80.3 kg)   03/28/17 174 lb 6.4 oz (79.1 kg)   03/13/17 171 lb (77.6 kg)              We Performed the Following     *UA reflex to Microscopic and Culture ([place] and [facility] (except [place] and [place])     Urine Microscopic          Today's Medication Changes          These changes are accurate as of: 7/21/17  3:40 PM.  If you have any questions, ask your nurse or doctor.               Start taking these medicines.        Dose/Directions    sulfamethoxazole-trimethoprim 800-160 MG per tablet   Commonly known as:  BACTRIM DS/SEPTRA DS   Used for:  Dysuria   Started by:  Sudheer Andrade MD        Dose:  1 tablet   Take 1 tablet by mouth 2 times daily   Quantity:  14 tablet   Refills:  0            Where to get your medicines      These medications were sent to [facility] [address] [place] [address] [address]   [address] [facility] [address]     Phone:  [phone]     sulfamethoxazole-trimethoprim 800-160 MG per tablet                Primary Care Provider Office Phone # Fax #    R Manjinder Mcbride [phone]       [place] [id number]        Equal Access to Services     ASIA MURRAY AH: Hadii farzaneh francisco hadashkandy Sobernadette, waaxda luqadaha, qaybta kaalmada adeericayatoy, paula cook. So [facility] [phone].    ATENCIÓN: Si habla español, tiene a amador disposición servicios gratuitos de asistencia lingüística. Llame al [phone].    We comply with applicable federal civil rights laws and [place] laws. We do not  discriminate on the basis of race, color, national origin, age, disability sex, sexual orientation or gender identity.            Thank you!     Thank you for choosing [facility]  for your care. Our goal is always to provide you with excellent care. Hearing back from our patients is one way we can continue to improve our services. Please take a few minutes to complete the written survey that you may receive in the mail after your visit with us. Thank you!             Your Updated Medication List - Protect others around you: Learn how to safely use, store and throw away your medicines at www.disposemymeds.org.          This list is accurate as of: 7/21/17  3:40 PM.  Always use your most recent med list.                   Brand Name Dispense Instructions for use Diagnosis    albuterol 108 (90 BASE) MCG/ACT Inhaler    PROAIR HFA/PROVENTIL HFA/VENTOLIN HFA    1 Inhaler    Inhale 2 puffs into the lungs every 4 hours as needed for shortness of breath / dyspnea or wheezing    Intermittent asthma, uncomplicated       levofloxacin 500 MG tablet    LEVAQUIN    10 tablet    Take 1 tablet (500 mg) by mouth daily    Acute bronchitis with symptoms greater than 10 days       OPTIHALER Marie     1 each    1 each every 4 hours as needed    Intermittent asthma, uncomplicated       sulfamethoxazole-trimethoprim 800-160 MG per tablet    BACTRIM DS/SEPTRA DS    14 tablet    Take 1 tablet by mouth 2 times daily    Dysuria       TYLENOL PO      Take 500 mg by mouth every 8 hours as needed for mild pain or fever Reported on 3/13/2017

## 2017-07-21 NOTE — PROGRESS NOTES
SUBJECTIVE:                                                    Deborah Swartz is a 21 year old female who presents to clinic today for the following health issues:    She has had 3 days of feeling like she has to urinate right after she urinates, inability to urinate when she feels like urinating. Discomfort after urinating. Her symptoms are improving. She has been pushing fluids.    The urine micro is negative.      URINARY TRACT SYMPTOMS  Onset: 2 days    Description:   Painful urination (Dysuria): YES  Blood in urine (Hematuria): YES   Delay in urine (Hesitency): YES    Intensity: moderate    Progression of Symptoms:  worsening and intermittent    Accompanying Signs & Symptoms:  Fever/chills: YES  Flank pain no   Nausea and vomiting: no   Any vaginal symptoms: none  Abdominal/Pelvic Pain: no     History:   History of frequent UTI's: YES  History of kidney stones: no   Sexually Active: YES  Possibility of pregnancy: No    Precipitating factors:   none    Therapies Tried and outcome: Increase fluid intake          Problem list and histories reviewed & adjusted, as indicated.  Additional history: as documented        Reviewed and updated as needed this visit by clinical staffTobacco  Allergies  Med Hx  Surg Hx  Fam Hx  Soc Hx      Reviewed and updated as needed this visit by Provider        Medical, surgical, family, social histories, allergies and meds reviewed and updated.    ROS:  General: No change in weight, sleep or appetite.  Normal energy.  No fever or chills  Resp: No coughing, wheezing or shortness of breath  CV: No chest pains or palpitations  GI: No nausea, vomiting,  heartburn, abdominal pain, diarrhea, constipation or change in bowel habits    Exam:  GENERAL APPEARANCE ADULT: Alert, no acute distress  ABDOMEN: soft, no organomegaly, masses or tenderness  MS: No CVA tenderness    ASSESSMENT:  (R30.0) Dysuria  (primary encounter diagnosis)  Comment:   Plan: *UA reflex to Microscopic and Culture  ([place]         and [facility] (except [place] and         [place]), Urine Microscopic,         sulfamethoxazole-trimethoprim (BACTRIM         DS/SEPTRA DS) 800-160 MG per tablet              PLAN:  Orders Placed This Encounter     *UA reflex to Microscopic and Culture ([place] and [facility] (except [place] and [place])     Urine Microscopic     sulfamethoxazole-trimethoprim (BACTRIM DS/SEPTRA DS) 800-160 MG per tablet   Recheck if not improving in 48 hours    There are no Patient Instructions on file for this visit.      Sudheer Andrade

## 2017-10-05 ENCOUNTER — TELEPHONE (OUTPATIENT)
Dept: FAMILY MEDICINE | Facility: CLINIC | Age: 22
End: 2017-10-05

## 2017-10-05 NOTE — TELEPHONE ENCOUNTER
Pt is past due for fu pap smear  Reminder letter was sent 9/14/17  LMTC and schedule at [facility]  Left this writers number in case of questions  If no reply and/or appt within two weeks (10/26/17) patient will be considered lost to pap tracking f/u.  Zaida Spencer,   Pap Tracking

## 2017-10-28 ENCOUNTER — HEALTH MAINTENANCE LETTER (OUTPATIENT)
Age: 22
End: 2017-10-28

## 2018-02-12 ENCOUNTER — OFFICE VISIT (OUTPATIENT)
Dept: FAMILY MEDICINE | Facility: CLINIC | Age: 23
End: 2018-02-12

## 2018-02-12 VITALS
DIASTOLIC BLOOD PRESSURE: 66 MMHG | TEMPERATURE: 98.5 F | WEIGHT: 177 LBS | HEART RATE: 72 BPM | BODY MASS INDEX: 31.36 KG/M2 | SYSTOLIC BLOOD PRESSURE: 102 MMHG | HEIGHT: 63 IN

## 2018-02-12 DIAGNOSIS — T15.02XA FOREIGN BODY OF LEFT CORNEA, INITIAL ENCOUNTER: Primary | ICD-10-CM

## 2018-02-12 PROCEDURE — 99213 OFFICE O/P EST LOW 20 MIN: CPT | Performed by: FAMILY MEDICINE

## 2018-02-12 RX ORDER — SULFACETAMIDE SODIUM 100 MG/ML
1 SOLUTION/ DROPS OPHTHALMIC 4 TIMES DAILY
Qty: 1 BOTTLE | Refills: 0 | Status: SHIPPED | OUTPATIENT
Start: 2018-02-12 | End: 2022-04-20

## 2018-02-12 NOTE — MR AVS SNAPSHOT
After Visit Summary   2/12/2018    Deborah Swartz    MRN: [id number]           Patient Information     Date Of Birth          1995        Visit Information        Provider Department      2/12/2018 6:00 PM Clarence Patel MD [facility]        Today's Diagnoses     Foreign body of left cornea, initial encounter    -  1      Care Instructions    ASSESSMENT:   (T15.02XA) Foreign body of left cornea, initial encounter  (primary encounter diagnosis)  Comment: the foreign material is gone but a small dent in your cornea which should heal within 48 hours.   Plan: sulfacetamide (BLEPH-10) 10 % ophthalmic         solution          Expect eye to feel a little bit like something still in it for the next day or so.  Should be back to normal within 48 hours.   Use antibiotic eye drops four times daily (one drop left eye) for 2 days.   Recheck in 2 days if any persistent eye symptoms.          Follow-ups after your visit        Who to contact     If you have questions or need follow up information about today's clinic visit or your schedule please contact [facility] directly at [phone].  Normal or non-critical lab and imaging results will be communicated to you by Suede Lanehart, letter or phone within 4 business days after the clinic has received the results. If you do not hear from us within 7 days, please contact the clinic through Suede Lanehart or phone. If you have a critical or abnormal lab result, we will notify you by phone as soon as possible.  Submit refill requests through Plumbr or call your pharmacy and they will forward the refill request to us. Please allow 3 business days for your refill to be completed.          Additional Information About Your Visit        MyChart Information     Plumbr lets you send messages to your doctor, view your test results, renew your prescriptions, schedule appointments and more. To sign up, go to [email or web address] . Click on  "\"Log in\" on the left side of the screen, which will take you to the Welcome page. Then click on \"Sign up Now\" on the right side of the page.     You will be asked to enter the access code listed below, as well as some personal information. Please follow the directions to create your username and password.     Your access code is: [email or web address]  Expires: 2018  6:40 PM     Your access code will  in 90 days. If you need help or a new code, please call your [place] clinic or [phone].        Care EveryWhere ID     This is your Care EveryWhere ID. This could be used by other organizations to access your [place] medical records  [id number]        Your Vitals Were     Pulse Temperature Height BMI (Body Mass Index)          72 98.5  F (36.9  C) (Tympanic) 5' 3\" (1.6 m) 31.35 kg/m2         Blood Pressure from Last 3 Encounters:   18 102/66   17 112/80   17 104/64    Weight from Last 3 Encounters:   18 177 lb (80.3 kg)   17 177 lb (80.3 kg)   17 174 lb 6.4 oz (79.1 kg)              Today, you had the following     No orders found for display         Today's Medication Changes          These changes are accurate as of 18  6:40 PM.  If you have any questions, ask your nurse or doctor.               Start taking these medicines.        Dose/Directions    sulfacetamide 10 % ophthalmic solution   Commonly known as:  BLEPH-10   Used for:  Foreign body of left cornea, initial encounter   Started by:  Clarence Patel MD        Dose:  1 drop   Place 1 drop Into the left eye 4 times daily   Quantity:  1 Bottle   Refills:  0         Stop taking these medicines if you haven't already. Please contact your care team if you have questions.     levofloxacin 500 MG tablet   Commonly known as:  LEVAQUIN   Stopped by:  Clarence Patel MD           OPTIHALER Marie   Stopped by:  Clarence Patel MD           sulfamethoxazole-trimethoprim 800-160 MG per tablet   Commonly known as:  " BACTRIM DS/SEPTRA DS   Stopped by:  Clarence Patel MD           TYLENOL PO   Stopped by:  Clarence Patel MD                Where to get your medicines      Some of these will need a paper prescription and others can be bought over the counter.  Ask your nurse if you have questions.     Bring a paper prescription for each of these medications     sulfacetamide 10 % ophthalmic solution                Primary Care Provider Office Phone # Fax #    R Manjinder Mcbride [phone] [address] [place] [address]        Equal Access to Services     [facility]: Hadii aad ku hadasho Soomaali, waaxda luqadaha, qaybta kaalmada adeegyada, waxay idiin hayaan adeeg renny segura . So [facility] [phone].    ATENCIÓN: Si habla español, tiene a amador disposición servicios gratuitos de asistencia lingüística. [facility] [phone].    We comply with applicable federal civil rights laws and [place] laws. We do not discriminate on the basis of race, color, national origin, age, disability, sex, sexual orientation, or gender identity.            Thank you!     Thank you for choosing [facility]  for your care. Our goal is always to provide you with excellent care. Hearing back from our patients is one way we can continue to improve our services. Please take a few minutes to complete the written survey that you may receive in the mail after your visit with us. Thank you!             Your Updated Medication List - Protect others around you: Learn how to safely use, store and throw away your medicines at www.disposemymeds.org.          This list is accurate as of 2/12/18  6:40 PM.  Always use your most recent med list.                   Brand Name Dispense Instructions for use Diagnosis    albuterol 108 (90 BASE) MCG/ACT Inhaler    PROAIR HFA/PROVENTIL HFA/VENTOLIN HFA    1 Inhaler    Inhale 2 puffs into the lungs every 4 hours as needed for shortness of breath / dyspnea or wheezing     Intermittent asthma, uncomplicated       sulfacetamide 10 % ophthalmic solution    BLEPH-10    1 Bottle    Place 1 drop Into the left eye 4 times daily    Foreign body of left cornea, initial encounter

## 2018-02-12 NOTE — PROGRESS NOTES
"  SUBJECTIVE:   Deborah Swartz is a 22 year old female who presents to clinic today for the following health issues:  Chief Complaint   Patient presents with     Eye Problem      Eye Problem       Duration: 3 days     Description (location/character/radiation): foreign body in left eye     Intensity:  mild    Accompanying signs and symptoms: irritating with blinking, clear drainage. Denies any vision changes.     History (similar episodes/previous evaluation): None    Precipitating or alleviating factors: None     Therapies tried and outcome: Lubricating eye drops, flushing eye        ACT score=19  Uses albuteral inhaler a few times a week.    She feels she has been doing oK with the inhaler.  NOt interested in adding another inhaler at this time.     Patient Active Problem List   Diagnosis     Intermittent asthma     Papanicolaou smear of cervix with atypical squamous cells of undetermined significance (ASC-US)     Tobacco use disorder        Occupation: restaurant kitchen    OBJECTIVE:Blood pressure 102/66, pulse 72, temperature 98.5  F (36.9  C), temperature source Tympanic, height 5' 3\" (1.6 m), weight 177 lb (80.3 kg), not currently breastfeeding. BMI=Body mass index is 31.35 kg/(m^2).  GENERAL APPEARANCE ADULT: Alert, no acute distress, obese  EYES: PERRL, EOM normal, there is a visible foreign body lower iris slightly medial.    Mild swelling lower eyelid.   Fluoroscein staining positive for the foreign body.   I placed anesthetic drop in the eye and removed foreign body with moist cotton tip applicator.  Small corneal defect remaining.      ASSESSMENT:   (T15.02XA) Foreign body of left cornea, initial encounter  (primary encounter diagnosis)  Comment: the foreign material is gone but a small dent in your cornea which should heal within 48 hours.   Plan: sulfacetamide (BLEPH-10) 10 % ophthalmic         solution          Expect eye to feel a little bit like something still in it for the next day or so.  Should " be back to normal within 48 hours.   Use antibiotic eye drops four times daily (one drop left eye) for 2 days.   Recheck in 2 days if any persistent eye symptoms.

## 2018-02-13 ASSESSMENT — ASTHMA QUESTIONNAIRES: ACT_TOTALSCORE: 20

## 2018-02-13 NOTE — NURSING NOTE
"Chief Complaint   Patient presents with     Eye Problem       Initial /66 (Cuff Size: Adult Regular)  Pulse 72  Temp 98.5  F (36.9  C) (Tympanic)  Ht 5' 3\" (1.6 m)  Wt 177 lb (80.3 kg)  BMI 31.35 kg/m2 Estimated body mass index is 31.35 kg/(m^2) as calculated from the following:    Height as of this encounter: 5' 3\" (1.6 m).    Weight as of this encounter: 177 lb (80.3 kg).      Health Maintenance that is potentially due pending provider review:  Pap Smear, chlamydia screening     Pt will schedule physical appt.    Alexia Pascual CMA        "

## 2018-02-13 NOTE — PATIENT INSTRUCTIONS
ASSESSMENT:   (T15.02XA) Foreign body of left cornea, initial encounter  (primary encounter diagnosis)  Comment: the foreign material is gone but a small dent in your cornea which should heal within 48 hours.   Plan: sulfacetamide (BLEPH-10) 10 % ophthalmic         solution          Expect eye to feel a little bit like something still in it for the next day or so.  Should be back to normal within 48 hours.   Use antibiotic eye drops four times daily (one drop left eye) for 2 days.   Recheck in 2 days if any persistent eye symptoms.

## 2018-04-13 ENCOUNTER — TELEPHONE (OUTPATIENT)
Dept: FAMILY MEDICINE | Facility: CLINIC | Age: 23
End: 2018-04-13

## 2018-04-13 DIAGNOSIS — J45.20 INTERMITTENT ASTHMA, UNCOMPLICATED: ICD-10-CM

## 2018-04-13 RX ORDER — ALBUTEROL SULFATE 90 UG/1
2 AEROSOL, METERED RESPIRATORY (INHALATION) EVERY 4 HOURS PRN
Qty: 1 INHALER | Refills: 1 | Status: SHIPPED | OUTPATIENT
Start: 2018-04-13 | End: 2022-04-22

## 2018-04-13 NOTE — TELEPHONE ENCOUNTER
Prescription approved per [facility] Refill Protocol.    Vickie GALLARDO RN

## 2018-04-13 NOTE — TELEPHONE ENCOUNTER
Reason for Call:  Medication or medication refill:    Do you use a [facility]?  Name of the pharmacy and phone number for the current request:  [facility] [place], [place]    Name of the medication requested: Albuterol Inhaler    Other request: pt is calling asking for a refill on her medication.    Can we leave a detailed message on this number? YES    Phone number patient can be reached at: Home number on file [phone] (home)    Best Time: any    Call taken on 4/13/2018 at 1:19 PM by Tiarra Shrestha

## 2018-11-11 ENCOUNTER — HEALTH MAINTENANCE LETTER (OUTPATIENT)
Age: 23
End: 2018-11-11

## 2020-02-05 ENCOUNTER — OFFICE VISIT - HEALTHEAST (OUTPATIENT)
Dept: FAMILY MEDICINE | Facility: CLINIC | Age: 25
End: 2020-02-05

## 2020-02-05 DIAGNOSIS — F32.A DEPRESSION, UNSPECIFIED DEPRESSION TYPE: ICD-10-CM

## 2020-02-05 DIAGNOSIS — Z76.89 ENCOUNTER TO ESTABLISH CARE: ICD-10-CM

## 2020-02-05 DIAGNOSIS — R30.0 DYSURIA: ICD-10-CM

## 2020-02-05 DIAGNOSIS — N30.01 ACUTE CYSTITIS WITH HEMATURIA: ICD-10-CM

## 2020-02-05 LAB
ALBUMIN UR-MCNC: NEGATIVE MG/DL
APPEARANCE UR: CLEAR
BACTERIA #/AREA URNS HPF: ABNORMAL HPF
BILIRUB UR QL STRIP: NEGATIVE
COLOR UR AUTO: YELLOW
GLUCOSE UR STRIP-MCNC: NEGATIVE MG/DL
HGB UR QL STRIP: NEGATIVE
KETONES UR STRIP-MCNC: ABNORMAL MG/DL
LEUKOCYTE ESTERASE UR QL STRIP: ABNORMAL
NITRATE UR QL: POSITIVE
PH UR STRIP: 5.5 [PH] (ref 5–8)
RBC #/AREA URNS AUTO: ABNORMAL HPF
SP GR UR STRIP: 1.02 (ref 1–1.03)
SQUAMOUS #/AREA URNS AUTO: ABNORMAL LPF
UROBILINOGEN UR STRIP-ACNC: ABNORMAL
WBC #/AREA URNS AUTO: ABNORMAL HPF

## 2020-02-05 ASSESSMENT — PATIENT HEALTH QUESTIONNAIRE - PHQ9: SUM OF ALL RESPONSES TO PHQ QUESTIONS 1-9: 11

## 2020-02-05 ASSESSMENT — MIFFLIN-ST. JEOR: SCORE: 1463.92

## 2020-02-07 LAB — BACTERIA SPEC CULT: ABNORMAL

## 2020-02-21 ENCOUNTER — OFFICE VISIT - HEALTHEAST (OUTPATIENT)
Dept: FAMILY MEDICINE | Facility: CLINIC | Age: 25
End: 2020-02-21

## 2020-02-21 DIAGNOSIS — F32.A DEPRESSION, UNSPECIFIED DEPRESSION TYPE: ICD-10-CM

## 2020-02-21 ASSESSMENT — PATIENT HEALTH QUESTIONNAIRE - PHQ9: SUM OF ALL RESPONSES TO PHQ QUESTIONS 1-9: 8

## 2020-02-27 ENCOUNTER — COMMUNICATION - HEALTHEAST (OUTPATIENT)
Dept: SCHEDULING | Facility: CLINIC | Age: 25
End: 2020-02-27

## 2020-07-14 ENCOUNTER — OFFICE VISIT - HEALTHEAST (OUTPATIENT)
Dept: FAMILY MEDICINE | Facility: CLINIC | Age: 25
End: 2020-07-14

## 2020-07-14 DIAGNOSIS — S49.91XD RIGHT SHOULDER INJURY, SUBSEQUENT ENCOUNTER: ICD-10-CM

## 2020-07-20 ENCOUNTER — OFFICE VISIT - HEALTHEAST (OUTPATIENT)
Dept: FAMILY MEDICINE | Facility: CLINIC | Age: 25
End: 2020-07-20

## 2020-07-20 DIAGNOSIS — Z32.01 PREGNANCY TEST POSITIVE: ICD-10-CM

## 2020-07-20 DIAGNOSIS — N92.6 MISSED MENSES: ICD-10-CM

## 2020-07-20 LAB — HCG UR QL: POSITIVE

## 2020-07-27 ENCOUNTER — OFFICE VISIT - HEALTHEAST (OUTPATIENT)
Dept: FAMILY MEDICINE | Facility: CLINIC | Age: 25
End: 2020-07-27

## 2020-07-27 DIAGNOSIS — M25.511 RIGHT SHOULDER PAIN, UNSPECIFIED CHRONICITY: ICD-10-CM

## 2020-08-07 ENCOUNTER — OFFICE VISIT - HEALTHEAST (OUTPATIENT)
Dept: FAMILY MEDICINE | Facility: CLINIC | Age: 25
End: 2020-08-07

## 2020-08-07 ENCOUNTER — COMMUNICATION - HEALTHEAST (OUTPATIENT)
Dept: FAMILY MEDICINE | Facility: CLINIC | Age: 25
End: 2020-08-07

## 2020-08-07 DIAGNOSIS — M25.511 ACUTE PAIN OF RIGHT SHOULDER: ICD-10-CM

## 2020-08-07 LAB — HIV 1&2 EXT: NORMAL

## 2020-08-07 ASSESSMENT — PATIENT HEALTH QUESTIONNAIRE - PHQ9: SUM OF ALL RESPONSES TO PHQ QUESTIONS 1-9: 12

## 2020-08-13 ENCOUNTER — OFFICE VISIT - HEALTHEAST (OUTPATIENT)
Dept: PHYSICAL THERAPY | Facility: REHABILITATION | Age: 25
End: 2020-08-13

## 2020-08-13 DIAGNOSIS — M25.511 RIGHT SHOULDER PAIN, UNSPECIFIED CHRONICITY: ICD-10-CM

## 2020-08-28 ENCOUNTER — COMMUNICATION - HEALTHEAST (OUTPATIENT)
Dept: PHYSICAL THERAPY | Facility: REHABILITATION | Age: 25
End: 2020-08-28

## 2020-09-11 ENCOUNTER — OFFICE VISIT - HEALTHEAST (OUTPATIENT)
Dept: FAMILY MEDICINE | Facility: CLINIC | Age: 25
End: 2020-09-11

## 2020-09-11 DIAGNOSIS — M25.511 RIGHT SHOULDER PAIN, UNSPECIFIED CHRONICITY: ICD-10-CM

## 2020-09-17 ENCOUNTER — OFFICE VISIT (OUTPATIENT)
Dept: ORTHOPEDICS | Facility: CLINIC | Age: 25
End: 2020-09-17
Payer: OTHER MISCELLANEOUS

## 2020-09-17 VITALS
DIASTOLIC BLOOD PRESSURE: 71 MMHG | HEIGHT: 63 IN | SYSTOLIC BLOOD PRESSURE: 111 MMHG | BODY MASS INDEX: 30.12 KG/M2 | WEIGHT: 170 LBS
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
## 2020-09-17 DIAGNOSIS — M25.511 ACUTE PAIN OF RIGHT SHOULDER: Primary | ICD-10-CM

## 2020-09-17 DIAGNOSIS — M75.51 SUBACROMIAL BURSITIS OF RIGHT SHOULDER JOINT: ICD-10-CM

## 2020-09-17 PROCEDURE — 20611 DRAIN/INJ JOINT/BURSA W/US: CPT | Mod: RT | Performed by: FAMILY MEDICINE

## 2020-09-17 PROCEDURE — 99243 OFF/OP CNSLTJ NEW/EST LOW 30: CPT | Mod: 25 | Performed by: FAMILY MEDICINE

## 2020-09-17 RX ADMIN — ROPIVACAINE HYDROCHLORIDE 3 ML: 5 INJECTION, SOLUTION EPIDURAL; INFILTRATION; PERINEURAL at 17:10

## 2020-09-17 RX ADMIN — TRIAMCINOLONE ACETONIDE 40 MG: 40 INJECTION, SUSPENSION INTRA-ARTICULAR; INTRAMUSCULAR at 17:10

## 2020-09-17 ASSESSMENT — MIFFLIN-ST. JEOR: SCORE: 1485.24

## 2020-09-17 NOTE — Clinical Note
"    2020         RE: Deborah Swartz  [address] [place] [id number]        Dear Colleague,    Thank you for referring your patient, Deborah Swartz, to the [facility]. Please see a copy of my visit note below.    Deborah Swartz  :  1995  DOS: 2020  MRN: [id number]    Sports Medicine Clinic Visit    PCP: No primary care provider on file.    Deborah Swartz is a 25 year old Right hand dominant female who is seen in consultation at the request of  Aaron Ewing M.D. presenting with right shoulder injury.    Injury: At work - reaching with right arm extended in front of her when noted \"sharp\" pain in right shoulder ~ 3 months ago (2020).  Pain was improving with physical therapy until returning to work until returning to work ~ 10 days ago noting multiple painful \"pop\" sensations in right shoulder.  Pain located over right superior lateral shoulder, nonradiating.  Additional Features:  Positive: popping and weakness.  Symptoms are better with Ibuprofen and Rest.  Symptoms are worse with: shoulder flexion/abduction, reaching, lifting right shoulder.  Other evaluation and/or treatments so far consists of: Ice, Tylenol, Ibuprofen, Rest and physical therapy, PCP.  Recent imaging completed: No recent imaging completed.  Prior History of related problems: none    Social History: works doing [employer], [employer] for [employer]    Review of Systems  Musculoskeletal: as above  Remainder of review of systems is negative including constitutional, CV, pulmonary, GI, Skin and Neurologic except as noted in HPI or medical history.    Past Medical History:   Diagnosis Date     Papanicolaou smear of cervix with atypical squamous cells of undetermined significance (ASC-US) 16     No past surgical history on file.  Family History   Problem Relation Age of Onset     Hypertension Mother      GERD Mother      Congenital Anomalies Mother         Spinal Bifada     " "Anxiety Disorder Mother      Depression Father      Diabetes Paternal Grandmother        Objective  /71   Ht 1.6 m (5' 3\")   Wt 77.1 kg (170 lb)   BMI 30.11 kg/m        General: healthy, alert and in no distress      HEENT: no scleral icterus or conjunctival erythema     Skin: no suspicious lesions or rash. No jaundice.     CV: regular rhythm by palpation, 2+ distal pulses, no pedal edema      Resp: normal respiratory effort without conversational dyspnea     Psych: normal mood and affect      Gait: nonantalgic, appropriate coordination and balance     Neuro: normal light touch sensory exam of the extremities. Motor strength as noted below       Right Shoulder exam    ROM:        Full active and passive ROM with flexion, extension, abduction, internal and external rotation.       asymmetric scapular motion on R       Painful terminal flexion and abduction, mildly in ER    Tender:        subacromial space       Lateral deltoid    Non Tender:       remainder of shoulder       sternoclavicular joint       acromioclavicular joint       posterior shoulder       periscapular region    Strength:        abduction 5-/5       internal rotation 5/5       external rotation 5-/5       adduction 5/5    Impingement testing:        positive (+) Neer       positive (+) Gastelum       positive (+) empty can       neg (-) crossover       neg (-) O'whit       neg (-) crank    Stability testing:       neg (-) relocation       neg (-) anterior glide       neg (-) sulcus sign    Skin:       no visible deformities       well perfused       capillary refill brisk    Sensation:        normal sensation over shoulder and upper extremity       Radiology  No imaging today    Large Joint Injection/Arthocentesis: R subacromial bursa    Date/Time: 9/17/2020 5:10 PM  Performed by: Garrett Dixon DO  Authorized by: Garrett Dixon DO     Indications:  Pain  Needle Size:  22 G  Guidance: ultrasound    Approach:  " "Lateral  Location:  Shoulder      Site:  R subacromial bursa  Medications:  40 mg triamcinolone 40 MG/ML; 3 mL ropivacaine 5 MG/ML  Outcome:  Tolerated well, no immediate complications  Procedure discussed: discussed risks, benefits, and alternatives    Consent Given by:  Patient  Timeout: timeout called immediately prior to procedure    Prep: patient was prepped and draped in usual sterile fashion        Assessment:  1. Acute pain of right shoulder    2. Subacromial bursitis of right shoulder joint    3. Work related injury        Plan:  Discussed the assessment with the patient.  Follow up: 3 weeks  Letter provided for work:  \"Deborah was seen in my office today for evaluation of a right shoulder injury that occurred at work on June 26, 2020.  She has been evaluated and treated with Tylenol, 2 visits of physical therapy, and work restrictions.  Currently there are limitations on diagnostic imaging of her shoulder.  Based on her exam, she has signs of rotator cuff tendinitis/bursitis.  There is no clear insufficiency of her rotator cuff on exam.  She also has pain in her AC joint, her biceps tendon, and generally about the shoulder.  I have placed a new physical therapy order today as I believe that this is an important ongoing treatment for her shoulder injury.  After reviewing potential risks and benefits, I also performed an unit(s);ltrasound-guided subacromial corticosteroid injection today.  I will assess her progress in 2-3 weeks, she will follow up with me at that time.  I recommend continuing to be off from work until that time.  Updated recommendations will be provided at her next visit, sooner if needed.\"  No imaging today as patient is pregnant  No concerning signs for RTC insufficiency, will consider in the future for labile/worsening sx  Trial of one-time CSI today to limit NSAID use and provide local relief, will not be a long-term plan  Reviewed relative risks of CSI reviewed in detail  Reviewed risks " of corticosteroid use including CSI during current viral pandemic, patient acknowledged and wished to proceed  The patient has greater than 5/10 pain with limitations in daily activity and has exhausted other supportive care measures including OTC medications without relief  Expectations and goals of CSI reviewed  Often 2-3 days for steroid effect, and can take up to two weeks for maximum effect  We discussed modified progressive pain-free activity as tolerated  Do not overuse in first two weeks if feeling better due to concern for vulnerability while steroid is working  Supportive care reviewed  All questions were answered today  Contact us with additional questions or concerns  Signs and sx of concern reviewed    Thanks very much for sending this nice lady to us, I will keep you updated with her progress      Garrett Dixon DO, EB  Primary Care Sports Medicine  [facility]                 Disclaimer: This note consists of symbols derived from keyboarding, dictation and/or voice recognition software. As a result, there may be errors in the script that have gone undetected. Please consider this when interpreting information found in this chart.    Again, thank you for allowing me to participate in the care of your patient.        Sincerely,        Garrett Dixon DO

## 2020-09-17 NOTE — LETTER
September 17, 2020      Deborah was seen in my office today for evaluation of a right shoulder injury that occurred at work on June 26, 2020.  She has been evaluated and treated with Tylenol, 2 visits of physical therapy, and work restrictions.  Currently there are limitations on diagnostic imaging of her shoulder.  Based on her exam, she has signs of rotator cuff tendinitis/bursitis.  There is no clear insufficiency of her rotator cuff on exam.  She also has pain in her AC joint, her biceps tendon, and generally about the shoulder.  I have placed a new physical therapy order today as I believe that this is an important ongoing treatment for her shoulder injury.  After reviewing potential risks and benefits, I also performed an unit(s);ltrasound-guided subacromial corticosteroid injection today.  I will assess her progress in 2-3 weeks, she will follow up with me at that time.  I recommend continuing to be off from work until that time.  Updated recommendations will be provided at her next visit, sooner if needed.      Garrett Zambrano DO, CAQ  Primary Care Sports Medicine  [facility]

## 2020-09-17 NOTE — PROGRESS NOTES
"Deborah Swartz  :  1995  DOS: 2020  MRN: [id number]    Sports Medicine Clinic Visit    PCP: No primary care provider on file.    Deborah Swartz is a 25 year old Right hand dominant female who is seen in consultation at the request of  Aaron Ewing M.D. presenting with right shoulder injury.    Injury: At work - reaching with right arm extended in front of her when noted \"sharp\" pain in right shoulder ~ 3 months ago (2020).  Pain was improving with physical therapy until returning to work until returning to work ~ 10 days ago noting multiple painful \"pop\" sensations in right shoulder.  Pain located over right superior lateral shoulder, nonradiating.  Additional Features:  Positive: popping and weakness.  Symptoms are better with Ibuprofen and Rest.  Symptoms are worse with: shoulder flexion/abduction, reaching, lifting right shoulder.  Other evaluation and/or treatments so far consists of: Ice, Tylenol, Ibuprofen, Rest and physical therapy, PCP.  Recent imaging completed: No recent imaging completed.  Prior History of related problems: none    Social History: works doing [employer], [employer] for [employer]    Review of Systems  Musculoskeletal: as above  Remainder of review of systems is negative including constitutional, CV, pulmonary, GI, Skin and Neurologic except as noted in HPI or medical history.    Past Medical History:   Diagnosis Date     Papanicolaou smear of cervix with atypical squamous cells of undetermined significance (ASC-US) 16     No past surgical history on file.  Family History   Problem Relation Age of Onset     Hypertension Mother      GERD Mother      Congenital Anomalies Mother         Spinal Bifada     Anxiety Disorder Mother      Depression Father      Diabetes Paternal Grandmother        Objective  /71   Ht 1.6 m (5' 3\")   Wt 77.1 kg (170 lb)   BMI 30.11 kg/m        General: healthy, alert and in no distress      HEENT: no scleral icterus or conjunctival " erythema     Skin: no suspicious lesions or rash. No jaundice.     CV: regular rhythm by palpation, 2+ distal pulses, no pedal edema      Resp: normal respiratory effort without conversational dyspnea     Psych: normal mood and affect      Gait: nonantalgic, appropriate coordination and balance     Neuro: normal light touch sensory exam of the extremities. Motor strength as noted below       Right Shoulder exam    ROM:        Full active and passive ROM with flexion, extension, abduction, internal and external rotation.       asymmetric scapular motion on R       Painful terminal flexion and abduction, mildly in ER    Tender:        subacromial space       Lateral deltoid    Non Tender:       remainder of shoulder       sternoclavicular joint       acromioclavicular joint       posterior shoulder       periscapular region    Strength:        abduction 5-/5       internal rotation 5/5       external rotation 5-/5       adduction 5/5    Impingement testing:        positive (+) Neer       positive (+) Gastelum       positive (+) empty can       neg (-) crossover       neg (-) O'whit       neg (-) crank    Stability testing:       neg (-) relocation       neg (-) anterior glide       neg (-) sulcus sign    Skin:       no visible deformities       well perfused       capillary refill brisk    Sensation:        normal sensation over shoulder and upper extremity       Radiology  No imaging today    Large Joint Injection/Arthocentesis: R subacromial bursa    Date/Time: 9/17/2020 5:10 PM  Performed by: Garrett Dixon DO  Authorized by: Garrett Dixon DO     Indications:  Pain  Needle Size:  22 G  Guidance: ultrasound    Approach:  Lateral  Location:  Shoulder      Site:  R subacromial bursa  Medications:  40 mg triamcinolone 40 MG/ML; 3 mL ropivacaine 5 MG/ML  Outcome:  Tolerated well, no immediate complications  Procedure discussed: discussed risks, benefits, and alternatives    Consent Given by:   "Patient  Timeout: timeout called immediately prior to procedure    Prep: patient was prepped and draped in usual sterile fashion        Assessment:  1. Acute pain of right shoulder    2. Subacromial bursitis of right shoulder joint    3. Work related injury        Plan:  Discussed the assessment with the patient.  Follow up: 3 weeks  Letter provided for work:  \"Deborah was seen in my office today for evaluation of a right shoulder injury that occurred at work on June 26, 2020.  She has been evaluated and treated with Tylenol, 2 visits of physical therapy, and work restrictions.  Currently there are limitations on diagnostic imaging of her shoulder.  Based on her exam, she has signs of rotator cuff tendinitis/bursitis.  There is no clear insufficiency of her rotator cuff on exam.  She also has pain in her AC joint, her biceps tendon, and generally about the shoulder.  I have placed a new physical therapy order today as I believe that this is an important ongoing treatment for her shoulder injury.  After reviewing potential risks and benefits, I also performed an unit(s);ltrasound-guided subacromial corticosteroid injection today.  I will assess her progress in 2-3 weeks, she will follow up with me at that time.  I recommend continuing to be off from work until that time.  Updated recommendations will be provided at her next visit, sooner if needed.\"  No imaging today as patient is pregnant  No concerning signs for RTC insufficiency, will consider in the future for labile/worsening sx  Trial of one-time CSI today to limit NSAID use and provide local relief, will not be a long-term plan  Reviewed relative risks of CSI reviewed in detail  Reviewed risks of corticosteroid use including CSI during current viral pandemic, patient acknowledged and wished to proceed  The patient has greater than 5/10 pain with limitations in daily activity and has exhausted other supportive care measures including OTC medications without " relief  Expectations and goals of CSI reviewed  Often 2-3 days for steroid effect, and can take up to two weeks for maximum effect  We discussed modified progressive pain-free activity as tolerated  Do not overuse in first two weeks if feeling better due to concern for vulnerability while steroid is working  Supportive care reviewed  All questions were answered today  Contact us with additional questions or concerns  Signs and sx of concern reviewed    Thanks very much for sending this nice lady to us, I will keep you updated with her progress      Garrett Dixon DO, CAQ  Primary Care Sports Medicine  [facility]                 Disclaimer: This note consists of symbols derived from keyboarding, dictation and/or voice recognition software. As a result, there may be errors in the script that have gone undetected. Please consider this when interpreting information found in this chart.

## 2020-09-18 ENCOUNTER — OFFICE VISIT - HEALTHEAST (OUTPATIENT)
Dept: FAMILY MEDICINE | Facility: CLINIC | Age: 25
End: 2020-09-18

## 2020-09-18 DIAGNOSIS — M25.511 ACUTE PAIN OF RIGHT SHOULDER: ICD-10-CM

## 2020-09-18 ASSESSMENT — PATIENT HEALTH QUESTIONNAIRE - PHQ9: SUM OF ALL RESPONSES TO PHQ QUESTIONS 1-9: 9

## 2020-09-18 ASSESSMENT — ANXIETY QUESTIONNAIRES
GAD7 TOTAL SCORE: 3
2. NOT BEING ABLE TO STOP OR CONTROL WORRYING: NOT AT ALL
7. FEELING AFRAID AS IF SOMETHING AWFUL MIGHT HAPPEN: NOT AT ALL
6. BECOMING EASILY ANNOYED OR IRRITABLE: MORE THAN HALF THE DAYS
IF YOU CHECKED OFF ANY PROBLEMS ON THIS QUESTIONNAIRE, HOW DIFFICULT HAVE THESE PROBLEMS MADE IT FOR YOU TO DO YOUR WORK, TAKE CARE OF THINGS AT HOME, OR GET ALONG WITH OTHER PEOPLE: SOMEWHAT DIFFICULT
1. FEELING NERVOUS, ANXIOUS, OR ON EDGE: SEVERAL DAYS
3. WORRYING TOO MUCH ABOUT DIFFERENT THINGS: NOT AT ALL
5. BEING SO RESTLESS THAT IT IS HARD TO SIT STILL: NOT AT ALL
4. TROUBLE RELAXING: NOT AT ALL

## 2020-09-18 ASSESSMENT — MIFFLIN-ST. JEOR: SCORE: 1485.23

## 2020-09-29 RX ORDER — ROPIVACAINE HYDROCHLORIDE 5 MG/ML
3 INJECTION, SOLUTION EPIDURAL; INFILTRATION; PERINEURAL
Status: DISCONTINUED | OUTPATIENT
Start: 2020-09-17 | End: 2021-01-14

## 2020-09-29 RX ORDER — TRIAMCINOLONE ACETONIDE 40 MG/ML
40 INJECTION, SUSPENSION INTRA-ARTICULAR; INTRAMUSCULAR
Status: DISCONTINUED | OUTPATIENT
Start: 2020-09-17 | End: 2021-01-14

## 2020-10-05 ENCOUNTER — COMMUNICATION - HEALTHEAST (OUTPATIENT)
Dept: PHYSICAL THERAPY | Facility: REHABILITATION | Age: 25
End: 2020-10-05

## 2020-10-09 ENCOUNTER — OFFICE VISIT - HEALTHEAST (OUTPATIENT)
Dept: FAMILY MEDICINE | Facility: CLINIC | Age: 25
End: 2020-10-09

## 2020-10-09 DIAGNOSIS — M25.511 ACUTE PAIN OF RIGHT SHOULDER: ICD-10-CM

## 2020-10-09 ASSESSMENT — MIFFLIN-ST. JEOR: SCORE: 1484.78

## 2020-10-12 ENCOUNTER — OFFICE VISIT - HEALTHEAST (OUTPATIENT)
Dept: PHYSICAL THERAPY | Facility: REHABILITATION | Age: 25
End: 2020-10-12

## 2020-10-12 ENCOUNTER — COMMUNICATION - HEALTHEAST (OUTPATIENT)
Dept: FAMILY MEDICINE | Facility: CLINIC | Age: 25
End: 2020-10-12

## 2020-10-12 DIAGNOSIS — M25.511 RIGHT SHOULDER PAIN, UNSPECIFIED CHRONICITY: ICD-10-CM

## 2020-10-13 ENCOUNTER — TELEPHONE (OUTPATIENT)
Dept: ORTHOPEDICS | Facility: CLINIC | Age: 25
End: 2020-10-13

## 2020-10-13 NOTE — TELEPHONE ENCOUNTER
Reason for Call:  Form, our goal is to have forms completed with 7 days, however, some forms may require a visit or additional information.    Type of letter, form or note:  work comp - Records request    Who is the form from?: Work [employer]    Where did the form come from: form was faxed in    Phone number of person requesting form:   Can we leave a detailed message on this number:      Desired completion date of form: ASAP      How will form be returned?:  fax to [place]    Has the patient signed a consent form for release of information (may be included with form)? YES    Additional comments: 9/17 OV notes and workability letter were faxed to melissa as requested.  Copy of request sent to scanning.  Patient was to follow up in 2 - 3 weeks following 9/17/20 appointment, has not scheduled appointment at this time.    Slick Nunn ATC

## 2020-10-14 ENCOUNTER — OFFICE VISIT - HEALTHEAST (OUTPATIENT)
Dept: FAMILY MEDICINE | Facility: CLINIC | Age: 25
End: 2020-10-14

## 2020-10-14 DIAGNOSIS — M25.511 RIGHT SHOULDER PAIN, UNSPECIFIED CHRONICITY: ICD-10-CM

## 2020-10-20 ENCOUNTER — TELEPHONE (OUTPATIENT)
Dept: ORTHOPEDICS | Facility: CLINIC | Age: 25
End: 2020-10-20

## 2020-10-20 NOTE — TELEPHONE ENCOUNTER
Reason for Call:  Form, our goal is to have forms completed with 7 days, however, some forms may require a visit or additional information.    Type of letter, form or note:  work comp     Who is the form from?: Insurance - [employer]    Where did the form come from: form was faxed in    Phone number of person requesting form:   Can we leave a detailed message on this number:      Desired completion date of form: ASAP      How will form be returned?:  fax to [employer] Young    Has the patient signed a consent form for release of information (may be included with form)? YES    Additional comments: Patient seen for appointment on 9/17/20, subacromial injection completed at that visit.  Workability provided at that time recommend that patient remain off work until her follow up appointment that was recommended 2 - 3 weeks post-injection.  Patient has failed to scheduled follow up appointment at this time.   LVM for patient to contact clinic with update.   Would be open to providing updated work restrictions if patient is doing well, otherwise follow up in clinic for reassessment.    Form was started and place in Provider Basket for provider review/ completion at [facility].       Slick Nunn ATC

## 2020-10-22 ENCOUNTER — OFFICE VISIT - HEALTHEAST (OUTPATIENT)
Dept: PHYSICAL THERAPY | Facility: REHABILITATION | Age: 25
End: 2020-10-22

## 2020-10-22 DIAGNOSIS — M25.511 RIGHT SHOULDER PAIN, UNSPECIFIED CHRONICITY: ICD-10-CM

## 2020-10-22 NOTE — TELEPHONE ENCOUNTER
Huddled with Dr Dixon, patient should follow up in clinic prior to work restrictions being updated.      LVM with detailed information regarding that patient should follow up.  Would offer work in appointment on 10/23 @ [facility], otherwise may schedule at [facility] next week.    Slick Nunn ATC

## 2020-10-22 NOTE — TELEPHONE ENCOUNTER
Spoke to patient discussed she notes the overall right shoulder pain is moderately improved following her steroid injection on 9/17/20.  She does not feel that she would be able to repetitively lift > 15 - 20 lbs for 10 hours as required by her employer.  Patient reports that she has 3 physical therapy since her appointment on 9/17/20 @ [facility], although this cannot be confirmed via her chart.  Discussed that patient was originally supposed to follow up 2 - 3 weeks after injection ( ~ 10/8/20).  Will discuss case with Dr Dixon and return phone call to patient.    Slick Nunn ATC

## 2020-10-28 NOTE — PROGRESS NOTES
"Deborah Swartz  :  1995  DOS: 11/3/2020  MRN: [id number]    Sports Medicine Clinic Visit    PCP: No primary care provider on file.    Deborah Swartz is a 25 year old Right hand dominant female who is seen in consultation at the request of  Aaron Ewing M.D. presenting with right shoulder injury.    Injury: At work - reaching with right arm extended in front of her when noted \"sharp\" pain in right shoulder ~ 3 months ago (2020).  Pain was improving with physical therapy until returning to work until returning to work ~ 10 days ago noting multiple painful \"pop\" sensations in right shoulder.  Pain located over right superior lateral shoulder, nonradiating.  Additional Features:  Positive: popping and weakness.  Symptoms are better with Ibuprofen and Rest.  Symptoms are worse with: shoulder flexion/abduction, reaching, lifting right shoulder.  Other evaluation and/or treatments so far consists of: Ice, Tylenol, Ibuprofen, Rest and physical therapy, PCP.  Recent imaging completed: No recent imaging completed.  Prior History of related problems: none    Social History: works doing [employer] for [employer]    Interim History - 2020  Since last visit on 2020 patient has moderate right shoulder pain over the past ~ 2 - 3 weeks.  Right shoulder subacromial steorid injection completed on 20 provided good relief for ~ 4 weeks.  Patient notes that majority of pain located over anterior shoulder with radiating to upper trapezius, clavicle region.  She has been continuing with physical therapy with some benefit.  Still not working, last excused from work on 10/14/20 by PCP - Dr Yeung.  No new injury in the interim.      Review of Systems  Musculoskeletal: as above  Remainder of review of systems is negative including constitutional, CV, pulmonary, GI, Skin and Neurologic except as noted in HPI or medical history.    Past Medical History:   Diagnosis Date     Papanicolaou smear of " "cervix with atypical squamous cells of undetermined significance (ASC-US) 9/22/16     No past surgical history on file.  Family History   Problem Relation Age of Onset     Hypertension Mother      GERD Mother      Congenital Anomalies Mother         Spinal Bifada     Anxiety Disorder Mother      Depression Father      Diabetes Paternal Grandmother        Objective  /68   Ht 1.6 m (5' 3\")   Wt 80.7 kg (178 lb)   BMI 31.53 kg/m        General: healthy, alert and in no distress      HEENT: no scleral icterus or conjunctival erythema     Skin: no suspicious lesions or rash. No jaundice.     CV: regular rhythm by palpation, 2+ distal pulses, no pedal edema      Resp: normal respiratory effort without conversational dyspnea     Psych: normal mood and affect      Gait: nonantalgic, appropriate coordination and balance     Neuro: normal light touch sensory exam of the extremities. Motor strength as noted below       Right Shoulder exam    ROM:        Full active and passive ROM with flexion, extension, abduction, internal and external rotation.       asymmetric scapular motion on R, improving       Painful terminal flexion and abduction, mildly in ER    Tender:        subacromial space right, mild, improved       Lateral deltoid mild, improved       GH joint anteriorly      Long head of biceps    Non Tender:       remainder of shoulder       sternoclavicular joint, resolved from previous       acromioclavicular joint       periscapular region    Strength:        abduction 5-/5       internal rotation 5/5       external rotation 5-/5       adduction 5/5    Impingement testing:        positive (+) Neer       positive (+) Gastelum       positive (+) empty can       neg (-) crossover       neg (-) O'whit       neg (-) crank    Stability testing:       neg (-) relocation       neg (-) anterior glide       neg (-) sulcus sign    Skin:       no visible deformities       well perfused       capillary refill " "brisk    Sensation:        normal sensation over shoulder and upper extremity     Radiology  No imaging today    Procedures  Assessment:  1. Acute pain of right shoulder    2. Work related injury    3. Subacromial bursitis of right shoulder joint    4. Biceps tendonitis on right        Plan:  Discussed the assessment with the patient.  Follow up: 3 weeks  Letter provided for work:  \"Deborah was seen in my office today for evaluation of a right shoulder injury that occurred at work on June 26, 2020.  She has been evaluated and treated with Tylenol,  physical therapy, steroid injection around the rotator cuff, and work restrictions.  Currently there are limitations on diagnostic imaging of her shoulder.  Based on her exam, she has signs of ongoing but milder rotator cuff tendinitis/bursitis.  There is still no clear insufficiency of her rotator cuff on exam.  She still has pain in her biceps tendon and glenohumeral joint.  She will continue to work on this with rest from painful activity and ongoing physical therapy.  We reviewed the option for a glenohumeral joint vs biceps tendon sheath injection, but we will defer those for now.  I will assess her progress in another 4 weeks, she will follow up with me at that time.  I recommend continuing to be off from work until that time, given that she cannot work above shoulder height with her right arm, and has a lift/carry/push/pull restriction of 5 pounds with her right arm,, and will not be able to tolerate repetitive activity with the right arm at this time.  Updated recommendations will be provided at her next visit, sooner if needed.\"  No imaging today as patient is pregnant, defer MRI as well, though do not think this is needed at this point  No concerning signs for RTC insufficiency, will consider in the future for labile/worsening sx  Trial of one-time CSI was helpful to limit NSAID use and provide local relief, reviewed potential option to try around biceps tendon or " more likely GH joint in the future, but hope to avoid  Advised trying Voltaren gel for some antiinflammatory benefit, safe to use topical NSAID  Continue PT  Supportive care reviewed  All questions were answered today  Contact us with additional questions or concerns  Signs and sx of concern reviewed      Garrett Dixon DO, EB  Primary Care Sports Medicine  [facility]                 Disclaimer: This note consists of symbols derived from keyboarding, dictation and/or voice recognition software. As a result, there may be errors in the script that have gone undetected. Please consider this when interpreting information found in this chart.

## 2020-10-29 ENCOUNTER — OFFICE VISIT - HEALTHEAST (OUTPATIENT)
Dept: PHYSICAL THERAPY | Facility: REHABILITATION | Age: 25
End: 2020-10-29

## 2020-10-29 DIAGNOSIS — M25.511 RIGHT SHOULDER PAIN, UNSPECIFIED CHRONICITY: ICD-10-CM

## 2020-11-03 ENCOUNTER — OFFICE VISIT (OUTPATIENT)
Dept: ORTHOPEDICS | Facility: CLINIC | Age: 25
End: 2020-11-03
Payer: OTHER MISCELLANEOUS

## 2020-11-03 VITALS
SYSTOLIC BLOOD PRESSURE: 114 MMHG | HEIGHT: 63 IN | DIASTOLIC BLOOD PRESSURE: 68 MMHG | BODY MASS INDEX: 31.54 KG/M2 | WEIGHT: 178 LBS

## 2020-11-03 DIAGNOSIS — M25.511 ACUTE PAIN OF RIGHT SHOULDER: Primary | ICD-10-CM

## 2020-11-03 DIAGNOSIS — M75.51 SUBACROMIAL BURSITIS OF RIGHT SHOULDER JOINT: ICD-10-CM
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
## 2020-11-03 DIAGNOSIS — M75.21 BICEPS TENDONITIS ON RIGHT: ICD-10-CM

## 2020-11-03 PROCEDURE — 99213 OFFICE O/P EST LOW 20 MIN: CPT | Performed by: FAMILY MEDICINE

## 2020-11-03 ASSESSMENT — MIFFLIN-ST. JEOR: SCORE: 1521.53

## 2020-11-03 NOTE — LETTER
"    11/3/2020         RE: Deborah Swartz  [address]  [place] [address]        Dear Colleague,    Thank you for referring your patient, Deborah Swartz, to the [facility] [place]. Please see a copy of my visit note below.    Deborah Swartz  :  1995  DOS: 11/3/2020  MRN: [id number]    Sports Medicine Clinic Visit    PCP: No primary care provider on file.    Deborah Swartz is a 25 year old Right hand dominant female who is seen in consultation at the request of  Aaron Ewing M.D. presenting with right shoulder injury.    Injury: At work - reaching with right arm extended in front of her when noted \"sharp\" pain in right shoulder ~ 3 months ago (2020).  Pain was improving with physical therapy until returning to work until returning to work ~ 10 days ago noting multiple painful \"pop\" sensations in right shoulder.  Pain located over right superior lateral shoulder, nonradiating.  Additional Features:  Positive: popping and weakness.  Symptoms are better with Ibuprofen and Rest.  Symptoms are worse with: shoulder flexion/abduction, reaching, lifting right shoulder.  Other evaluation and/or treatments so far consists of: Ice, Tylenol, Ibuprofen, Rest and physical therapy, PCP.  Recent imaging completed: No recent imaging completed.  Prior History of related problems: none    Social History: works doing [employer], picking for [employer]    Interim History - 2020  Since last visit on 2020 patient has moderate right shoulder pain over the past ~ 2 - 3 weeks.  Right shoulder subacromial steorid injection completed on 20 provided good relief for ~ 4 weeks.  Patient notes that majority of pain located over anterior shoulder with radiating to upper trapezius, clavicle region.  She has been continuing with physical therapy with some benefit.  Still not working, last excused from work on 10/14/20 by PCP - Dr Yeung.  No new injury in the " "interim.      Review of Systems  Musculoskeletal: as above  Remainder of review of systems is negative including constitutional, CV, pulmonary, GI, Skin and Neurologic except as noted in HPI or medical history.    Past Medical History:   Diagnosis Date     Papanicolaou smear of cervix with atypical squamous cells of undetermined significance (ASC-US) 9/22/16     No past surgical history on file.  Family History   Problem Relation Age of Onset     Hypertension Mother      GERD Mother      Congenital Anomalies Mother         Spinal Bifada     Anxiety Disorder Mother      Depression Father      Diabetes Paternal Grandmother        Objective  /68   Ht 1.6 m (5' 3\")   Wt 80.7 kg (178 lb)   BMI 31.53 kg/m        General: healthy, alert and in no distress      HEENT: no scleral icterus or conjunctival erythema     Skin: no suspicious lesions or rash. No jaundice.     CV: regular rhythm by palpation, 2+ distal pulses, no pedal edema      Resp: normal respiratory effort without conversational dyspnea     Psych: normal mood and affect      Gait: nonantalgic, appropriate coordination and balance     Neuro: normal light touch sensory exam of the extremities. Motor strength as noted below       Right Shoulder exam    ROM:        Full active and passive ROM with flexion, extension, abduction, internal and external rotation.       asymmetric scapular motion on R, improving       Painful terminal flexion and abduction, mildly in ER    Tender:        subacromial space right, mild, improved       Lateral deltoid mild, improved       GH joint anteriorly      Long head of biceps    Non Tender:       remainder of shoulder       sternoclavicular joint, resolved from previous       acromioclavicular joint       periscapular region    Strength:        abduction 5-/5       internal rotation 5/5       external rotation 5-/5       adduction 5/5    Impingement testing:        positive (+) Neer       positive (+) Gastelum       positive " "(+) empty can       neg (-) crossover       neg (-) O'whit       neg (-) crank    Stability testing:       neg (-) relocation       neg (-) anterior glide       neg (-) sulcus sign    Skin:       no visible deformities       well perfused       capillary refill brisk    Sensation:        normal sensation over shoulder and upper extremity     Radiology  No imaging today    Procedures  Assessment:  1. Acute pain of right shoulder    2. Work related injury    3. Subacromial bursitis of right shoulder joint    4. Biceps tendonitis on right        Plan:  Discussed the assessment with the patient.  Follow up: 3 weeks  Letter provided for work:  \"Deborah was seen in my office today for evaluation of a right shoulder injury that occurred at work on June 26, 2020.  She has been evaluated and treated with Tylenol,  physical therapy, steroid injection around the rotator cuff, and work restrictions.  Currently there are limitations on diagnostic imaging of her shoulder.  Based on her exam, she has signs of ongoing but milder rotator cuff tendinitis/bursitis.  There is still no clear insufficiency of her rotator cuff on exam.  She still has pain in her biceps tendon and glenohumeral joint.  She will continue to work on this with rest from painful activity and ongoing physical therapy.  We reviewed the option for a glenohumeral joint vs biceps tendon sheath injection, but we will defer those for now.  I will assess her progress in another 4 weeks, she will follow up with me at that time.  I recommend continuing to be off from work until that time, given that she cannot work above shoulder height with her right arm, and has a lift/carry/push/pull restriction of 5 pounds with her right arm,, and will not be able to tolerate repetitive activity with the right arm at this time.  Updated recommendations will be provided at her next visit, sooner if needed.\"  No imaging today as patient is pregnant, defer MRI as well, though do not " think this is needed at this point  No concerning signs for RTC insufficiency, will consider in the future for labile/worsening sx  Trial of one-time CSI was helpful to limit NSAID use and provide local relief, reviewed potential option to try around biceps tendon or more likely GH joint in the future, but hope to avoid  Advised trying Voltaren gel for some antiinflammatory benefit, safe to use topical NSAID  Continue PT  Supportive care reviewed  All questions were answered today  Contact us with additional questions or concerns  Signs and sx of concern reviewed      Garrett Dixon DO, EB  Primary Care Sports Medicine  [facility]                 Disclaimer: This note consists of symbols derived from keyboarding, dictation and/or voice recognition software. As a result, there may be errors in the script that have gone undetected. Please consider this when interpreting information found in this chart.      Again, thank you for allowing me to participate in the care of your patient.        Sincerely,        Garrett Dixon DO

## 2020-11-03 NOTE — TELEPHONE ENCOUNTER
Updated forms were completed following patient's appointment earlier today.    Forms were faxed to [facility] as requested.  Patient should follow up in ~ 4 weeks.  Copy of forms sent to scanning.    Slick Nunn ATC

## 2020-11-03 NOTE — LETTER
November 3, 2020      Deborah was seen in my office today for evaluation of a right shoulder injury that occurred at work on June 26, 2020.  She has been evaluated and treated with Tylenol,  physical therapy, steroid injection around the rotator cuff, and work restrictions.  Currently there are limitations on diagnostic imaging of her shoulder.  Based on her exam, she has signs of ongoing but milder rotator cuff tendinitis/bursitis.  There is still no clear insufficiency of her rotator cuff on exam.  She still has pain in her biceps tendon and glenohumeral joint.  She will continue to work on this with rest from painful activity and ongoing physical therapy.  We reviewed the option for a glenohumeral joint vs biceps tendon sheath injection, but we will defer those for now.  I will assess her progress in another 4 weeks, she will follow up with me at that time.  I recommend continuing to be off from work until that time, given that she cannot work above shoulder height with her right arm, and has a lift/carry/push/pull restriction of 5 pounds with her right arm,, and will not be able to tolerate repetitive activity with the right arm at this time.  Updated recommendations will be provided at her next visit, sooner if needed.      Garrett Zambrano, , EB  Primary Care Sports Medicine  [facility]

## 2020-11-12 ENCOUNTER — OFFICE VISIT - HEALTHEAST (OUTPATIENT)
Dept: PHYSICAL THERAPY | Facility: REHABILITATION | Age: 25
End: 2020-11-12

## 2020-11-12 DIAGNOSIS — M25.511 RIGHT SHOULDER PAIN, UNSPECIFIED CHRONICITY: ICD-10-CM

## 2020-11-16 ENCOUNTER — OFFICE VISIT - HEALTHEAST (OUTPATIENT)
Dept: PHYSICAL THERAPY | Facility: REHABILITATION | Age: 25
End: 2020-11-16

## 2020-11-16 DIAGNOSIS — M25.511 RIGHT SHOULDER PAIN, UNSPECIFIED CHRONICITY: ICD-10-CM

## 2020-11-25 ENCOUNTER — COMMUNICATION - HEALTHEAST (OUTPATIENT)
Dept: PHYSICAL THERAPY | Facility: REHABILITATION | Age: 25
End: 2020-11-25

## 2020-12-01 ENCOUNTER — OFFICE VISIT (OUTPATIENT)
Dept: ORTHOPEDICS | Facility: CLINIC | Age: 25
End: 2020-12-01
Payer: OTHER MISCELLANEOUS

## 2020-12-01 VITALS
BODY MASS INDEX: 32.25 KG/M2 | DIASTOLIC BLOOD PRESSURE: 74 MMHG | WEIGHT: 182 LBS | SYSTOLIC BLOOD PRESSURE: 119 MMHG | HEIGHT: 63 IN

## 2020-12-01 DIAGNOSIS — M75.51 SUBACROMIAL BURSITIS OF RIGHT SHOULDER JOINT: ICD-10-CM

## 2020-12-01 DIAGNOSIS — M75.21 BICEPS TENDONITIS ON RIGHT: ICD-10-CM

## 2020-12-01 DIAGNOSIS — M25.511 ACUTE PAIN OF RIGHT SHOULDER: Primary | ICD-10-CM
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
## 2020-12-01 PROCEDURE — 99214 OFFICE O/P EST MOD 30 MIN: CPT | Performed by: FAMILY MEDICINE

## 2020-12-01 ASSESSMENT — MIFFLIN-ST. JEOR: SCORE: 1539.68

## 2020-12-01 NOTE — LETTER
"    2020         RE: Deborah Swartz  [address]  [place] [address]        Dear Colleague,    Thank you for referring your patient, Deborah Swartz, to the [facility] [place]. Please see a copy of my visit note below.    Deborah Swartz  :  1995  DOS: 20  MRN: [id number]    Sports Medicine Clinic Visit    PCP: No primary care provider on file.    Deborah Swartz is a 25 year old Right hand dominant female who is seen in consultation at the request of  Aaron Ewing M.D. presenting with right shoulder injury.    Injury: At work - reaching with right arm extended in front of her when noted \"sharp\" pain in right shoulder ~ 3 months ago (2020).  Pain was improving with physical therapy until returning to work until returning to work ~ 10 days ago noting multiple painful \"pop\" sensations in right shoulder.  Pain located over right superior lateral shoulder, nonradiating.  Additional Features:  Positive: popping and weakness.  Symptoms are better with Ibuprofen and Rest.  Symptoms are worse with: shoulder flexion/abduction, reaching, lifting right shoulder.  Other evaluation and/or treatments so far consists of: Ice, Tylenol, Ibuprofen, Rest and physical therapy, PCP.  Recent imaging completed: No recent imaging completed.  Prior History of related problems: none    Social History: works doing [employer], picking for [employer]    Interim History - 2020  Since last visit on 2020 patient has moderate right shoulder pain over the past ~ 2 - 3 weeks.  Right shoulder subacromial steorid injection completed on 20 provided good relief for ~ 4 weeks.  Patient notes that majority of pain located over anterior shoulder with radiating to upper trapezius, clavicle region.  She has been continuing with physical therapy with some benefit.  Still not working, last excused from work on 10/14/20 by PCP - Dr Yeung.  No new injury in the interim.    Interim " "History - December 1, 2020  Since last visit on 11/3/2020 patient has moderate right posterior shoulder pain.  Patient reports that shoulder pain and strength are progressing with physical therapy.  Notes overall fatigue after ~ 2 - 3 hours of use around her home.  No new injury in the interim.    Review of Systems  Musculoskeletal: as above  Remainder of review of systems is negative including constitutional, CV, pulmonary, GI, Skin and Neurologic except as noted in HPI or medical history.    Past Medical History:   Diagnosis Date     Papanicolaou smear of cervix with atypical squamous cells of undetermined significance (ASC-US) 9/22/16     No past surgical history on file.  Family History   Problem Relation Age of Onset     Hypertension Mother      GERD Mother      Congenital Anomalies Mother         Spinal Bifada     Anxiety Disorder Mother      Depression Father      Diabetes Paternal Grandmother        Objective  /74   Ht 1.6 m (5' 3\")   Wt 82.6 kg (182 lb)   BMI 32.24 kg/m        General: healthy, alert and in no distress      HEENT: no scleral icterus or conjunctival erythema     Skin: no suspicious lesions or rash. No jaundice.     CV: regular rhythm by palpation, 2+ distal pulses, no pedal edema      Resp: normal respiratory effort without conversational dyspnea     Psych: normal mood and affect      Gait: nonantalgic, appropriate coordination and balance     Neuro: normal light touch sensory exam of the extremities. Motor strength as noted below       Right Shoulder exam    ROM:        Full active and passive ROM with flexion, extension, abduction, internal and external rotation.       asymmetric scapular motion on R, improving       Painful mildly terminal flexion and abduction, ER    Tender:        subacromial space right, mild, improved       Lateral deltoid improved       GH joint anteriorly improving       Long head of biceps improving    Non Tender:       remainder of shoulder       " "sternoclavicular joint, resolved from previous       acromioclavicular joint       periscapular region    Strength:        abduction 5-/5       internal rotation 5/5       external rotation 5-/5       adduction 5/5    Impingement testing:       negative Neer       positive (+) Gastelum       positive (+) empty can       neg (-) crossover       neg (-) crank    Stability testing:       neg (-) relocation       neg (-) anterior glide       neg (-) sulcus sign    Skin:       no visible deformities       well perfused       capillary refill brisk    Sensation:        normal sensation over shoulder and upper extremity     Radiology  No imaging today    Procedures  Assessment:  1. Acute pain of right shoulder    2. Subacromial bursitis of right shoulder joint    3. Biceps tendonitis on right    4. Work related injury        Plan:  Discussed the assessment with the patient.  Follow up: 3 weeks  Letter provided for work:  \"Deborah was seen in my office today for evaluation of a right shoulder injury that occurred at work on June 26, 2020.  She has been evaluated and treated with Tylenol,  physical therapy, steroid injection around the rotator cuff, as well as work restrictions.  Currently there are limitations on diagnostic imaging of her shoulder.  Based on her exam, she has signs of ongoing but improving rotator cuff tendinitis/bursitis.  There is still no clear insufficiency of her rotator cuff on exam.  She still ongoing pain in her biceps tendon and glenohumeral joint.  She will continue to work on this with rest from painful activity and ongoing physical therapy.  We reviewed the option for a glenohumeral joint vs biceps tendon sheath injection, but we will defer those for now and hope to avoid.  I will assess her progress in another 6 weeks, she will follow up with me at that time.  I recommend no work above shoulder height with her right arm, and a lift/carry/push/pull restriction of 10 pounds with her right arm.  She " "may be able to tolerate repetitive activity with the right arm for up to 4 hours within the above restrictions at this time. She should be medically excused if there is no available work within these restriction.  Updated recommendations will be provided at her next visit, sooner if needed.\"  No imaging today as patient is pregnant, defer MRI as well, can consider in the future if sx remain labile, but still improving albeit slowly  No concerning signs for RTC insufficiency, will consider in the future for labile/worsening sx  Trial of one-time CSI was helpful to limit NSAID use and provide local relief, reviewed potential option to try around biceps tendon or more likely GH joint in the future, but hope to avoid  Advised trying Voltaren gel for some antiinflammatory benefit, safe to use topical NSAID  Continue PT  Supportive care reviewed  All questions were answered today  Contact us with additional questions or concerns  Signs and sx of concern reviewed      Garrett Dixon DO, EB  Primary Care Sports Medicine  [facility]       Time spent in one-on-one evaluation and discussion with patient regarding nature of problem, course, prior treatments, and therapeutic options, at least 50% of which was spent in counseling and coordination of care: 28 minutes          Disclaimer: This note consists of symbols derived from keyboarding, dictation and/or voice recognition software. As a result, there may be errors in the script that have gone undetected. Please consider this when interpreting information found in this chart.      Again, thank you for allowing me to participate in the care of your patient.        Sincerely,        Garrett Dixon DO    "

## 2020-12-01 NOTE — LETTER
December 1, 2020      Deborah was seen in my office today for evaluation of a right shoulder injury that occurred at work on June 26, 2020.  She has been evaluated and treated with Tylenol,  physical therapy, steroid injection around the rotator cuff, as well as work restrictions.  Currently there are limitations on diagnostic imaging of her shoulder.  Based on her exam, she has signs of ongoing but improving rotator cuff tendinitis/bursitis.  There is still no clear insufficiency of her rotator cuff on exam.  She still ongoing pain in her biceps tendon and glenohumeral joint.  She will continue to work on this with rest from painful activity and ongoing physical therapy.  We reviewed the option for a glenohumeral joint vs biceps tendon sheath injection, but we will defer those for now and hope to avoid.  I will assess her progress in another 6 weeks, she will follow up with me at that time.  I recommend no work above shoulder height with her right arm, and a lift/carry/push/pull restriction of 10 pounds with her right arm.  She may be able to tolerate repetitive activity with the right arm for up to 4 hours within the above restrictions at this time. She should be medically excused if there is no available work within these restriction.  Updated recommendations will be provided at her next visit, sooner if needed.      Garrett Zambrano, , CALENORE  Primary Care Sports Medicine  [facility]

## 2020-12-01 NOTE — PROGRESS NOTES
"Deborah Swartz  :  1995  DOS: 20  MRN: [id number]    Sports Medicine Clinic Visit    PCP: No primary care provider on file.    Deborah Swartz is a 25 year old Right hand dominant female who is seen in consultation at the request of  Aaron Ewing M.D. presenting with right shoulder injury.    Injury: At work - reaching with right arm extended in front of her when noted \"sharp\" pain in right shoulder ~ 3 months ago (2020).  Pain was improving with physical therapy until returning to work until returning to work ~ 10 days ago noting multiple painful \"pop\" sensations in right shoulder.  Pain located over right superior lateral shoulder, nonradiating.  Additional Features:  Positive: popping and weakness.  Symptoms are better with Ibuprofen and Rest.  Symptoms are worse with: shoulder flexion/abduction, reaching, lifting right shoulder.  Other evaluation and/or treatments so far consists of: Ice, Tylenol, Ibuprofen, Rest and physical therapy, PCP.  Recent imaging completed: No recent imaging completed.  Prior History of related problems: none    Social History: works doing [employer], picking for [employer]    Interim History - 2020  Since last visit on 2020 patient has moderate right shoulder pain over the past ~ 2 - 3 weeks.  Right shoulder subacromial steorid injection completed on 20 provided good relief for ~ 4 weeks.  Patient notes that majority of pain located over anterior shoulder with radiating to upper trapezius, clavicle region.  She has been continuing with physical therapy with some benefit.  Still not working, last excused from work on 10/14/20 by PCP - Dr Yeung.  No new injury in the interim.    Interim History - 2020  Since last visit on 11/3/2020 patient has moderate right posterior shoulder pain.  Patient reports that shoulder pain and strength are progressing with physical therapy.  Notes overall fatigue after ~ 2 - 3 hours of use around her " "home.  No new injury in the interim.    Review of Systems  Musculoskeletal: as above  Remainder of review of systems is negative including constitutional, CV, pulmonary, GI, Skin and Neurologic except as noted in HPI or medical history.    Past Medical History:   Diagnosis Date     Papanicolaou smear of cervix with atypical squamous cells of undetermined significance (ASC-US) 9/22/16     No past surgical history on file.  Family History   Problem Relation Age of Onset     Hypertension Mother      GERD Mother      Congenital Anomalies Mother         Spinal Bifada     Anxiety Disorder Mother      Depression Father      Diabetes Paternal Grandmother        Objective  /74   Ht 1.6 m (5' 3\")   Wt 82.6 kg (182 lb)   BMI 32.24 kg/m        General: healthy, alert and in no distress      HEENT: no scleral icterus or conjunctival erythema     Skin: no suspicious lesions or rash. No jaundice.     CV: regular rhythm by palpation, 2+ distal pulses, no pedal edema      Resp: normal respiratory effort without conversational dyspnea     Psych: normal mood and affect      Gait: nonantalgic, appropriate coordination and balance     Neuro: normal light touch sensory exam of the extremities. Motor strength as noted below       Right Shoulder exam    ROM:        Full active and passive ROM with flexion, extension, abduction, internal and external rotation.       asymmetric scapular motion on R, improving       Painful mildly terminal flexion and abduction, ER    Tender:        subacromial space right, mild, improved       Lateral deltoid improved       GH joint anteriorly improving       Long head of biceps improving    Non Tender:       remainder of shoulder       sternoclavicular joint, resolved from previous       acromioclavicular joint       periscapular region    Strength:        abduction 5-/5       internal rotation 5/5       external rotation 5-/5       adduction 5/5    Impingement testing:       negative Neer       " "positive (+) Gastelum       positive (+) empty can       neg (-) crossover       neg (-) crank    Stability testing:       neg (-) relocation       neg (-) anterior glide       neg (-) sulcus sign    Skin:       no visible deformities       well perfused       capillary refill brisk    Sensation:        normal sensation over shoulder and upper extremity     Radiology  No imaging today    Procedures  Assessment:  1. Acute pain of right shoulder    2. Subacromial bursitis of right shoulder joint    3. Biceps tendonitis on right    4. Work related injury        Plan:  Discussed the assessment with the patient.  Follow up: 3 weeks  Letter provided for work:  \"Deborah was seen in my office today for evaluation of a right shoulder injury that occurred at work on June 26, 2020.  She has been evaluated and treated with Tylenol,  physical therapy, steroid injection around the rotator cuff, as well as work restrictions.  Currently there are limitations on diagnostic imaging of her shoulder.  Based on her exam, she has signs of ongoing but improving rotator cuff tendinitis/bursitis.  There is still no clear insufficiency of her rotator cuff on exam.  She still ongoing pain in her biceps tendon and glenohumeral joint.  She will continue to work on this with rest from painful activity and ongoing physical therapy.  We reviewed the option for a glenohumeral joint vs biceps tendon sheath injection, but we will defer those for now and hope to avoid.  I will assess her progress in another 6 weeks, she will follow up with me at that time.  I recommend no work above shoulder height with her right arm, and a lift/carry/push/pull restriction of 10 pounds with her right arm.  She may be able to tolerate repetitive activity with the right arm for up to 4 hours within the above restrictions at this time. She should be medically excused if there is no available work within these restriction.  Updated recommendations will be provided at her " "next visit, sooner if needed.\"  No imaging today as patient is pregnant, defer MRI as well, can consider in the future if sx remain labile, but still improving albeit slowly  No concerning signs for RTC insufficiency, will consider in the future for labile/worsening sx  Trial of one-time CSI was helpful to limit NSAID use and provide local relief, reviewed potential option to try around biceps tendon or more likely GH joint in the future, but hope to avoid  Advised trying Voltaren gel for some antiinflammatory benefit, safe to use topical NSAID  Continue PT  Supportive care reviewed  All questions were answered today  Contact us with additional questions or concerns  Signs and sx of concern reviewed      Garrett Dixon DO, CALENORE  Primary Care Sports Medicine  [facility]       Time spent in one-on-one evaluation and discussion with patient regarding nature of problem, course, prior treatments, and therapeutic options, at least 50% of which was spent in counseling and coordination of care: 28 minutes          Disclaimer: This note consists of symbols derived from keyboarding, dictation and/or voice recognition software. As a result, there may be errors in the script that have gone undetected. Please consider this when interpreting information found in this chart.  "

## 2020-12-02 ENCOUNTER — OFFICE VISIT - HEALTHEAST (OUTPATIENT)
Dept: PHYSICAL THERAPY | Facility: REHABILITATION | Age: 25
End: 2020-12-02

## 2020-12-02 DIAGNOSIS — M25.511 RIGHT SHOULDER PAIN, UNSPECIFIED CHRONICITY: ICD-10-CM

## 2020-12-10 ENCOUNTER — OFFICE VISIT - HEALTHEAST (OUTPATIENT)
Dept: PHYSICAL THERAPY | Facility: REHABILITATION | Age: 25
End: 2020-12-10

## 2020-12-10 DIAGNOSIS — M25.511 RIGHT SHOULDER PAIN, UNSPECIFIED CHRONICITY: ICD-10-CM

## 2020-12-28 ENCOUNTER — OFFICE VISIT - HEALTHEAST (OUTPATIENT)
Dept: PHYSICAL THERAPY | Facility: REHABILITATION | Age: 25
End: 2020-12-28

## 2020-12-28 DIAGNOSIS — M25.511 RIGHT SHOULDER PAIN, UNSPECIFIED CHRONICITY: ICD-10-CM

## 2021-01-04 ENCOUNTER — OFFICE VISIT - HEALTHEAST (OUTPATIENT)
Dept: PHYSICAL THERAPY | Facility: REHABILITATION | Age: 26
End: 2021-01-04

## 2021-01-04 DIAGNOSIS — M25.511 RIGHT SHOULDER PAIN, UNSPECIFIED CHRONICITY: ICD-10-CM

## 2021-01-11 ENCOUNTER — OFFICE VISIT - HEALTHEAST (OUTPATIENT)
Dept: FAMILY MEDICINE | Facility: CLINIC | Age: 26
End: 2021-01-11

## 2021-01-11 DIAGNOSIS — M25.511 RIGHT SHOULDER PAIN, UNSPECIFIED CHRONICITY: ICD-10-CM

## 2021-01-14 ENCOUNTER — OFFICE VISIT (OUTPATIENT)
Dept: ORTHOPEDICS | Facility: CLINIC | Age: 26
End: 2021-01-14
Payer: OTHER MISCELLANEOUS

## 2021-01-14 VITALS
BODY MASS INDEX: 35.08 KG/M2 | DIASTOLIC BLOOD PRESSURE: 80 MMHG | HEIGHT: 63 IN | SYSTOLIC BLOOD PRESSURE: 119 MMHG | WEIGHT: 198 LBS

## 2021-01-14 DIAGNOSIS — M25.511 ACUTE PAIN OF RIGHT SHOULDER: Primary | ICD-10-CM
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
## 2021-01-14 DIAGNOSIS — M75.51 SUBACROMIAL BURSITIS OF RIGHT SHOULDER JOINT: ICD-10-CM

## 2021-01-14 DIAGNOSIS — M75.21 BICEPS TENDONITIS ON RIGHT: ICD-10-CM

## 2021-01-14 PROCEDURE — 99213 OFFICE O/P EST LOW 20 MIN: CPT | Performed by: FAMILY MEDICINE

## 2021-01-14 ASSESSMENT — MIFFLIN-ST. JEOR: SCORE: 1612.25

## 2021-01-14 NOTE — LETTER
"    2021         RE: Deborah Swartz  [address]  [place] [address]        Dear Colleague,    Thank you for referring your patient, Deborah Swartz, to the [facility] [place]. Please see a copy of my visit note below.    Deborah Swartz  :  1995  DOS: 21  MRN: [id number]    Sports Medicine Clinic Visit    PCP: No primary care provider on file.    Deborah Swartz is a 25 year old Right hand dominant female who is seen in consultation at the request of  Aaron Ewing M.D. presenting with right shoulder injury.    Injury: At work - reaching with right arm extended in front of her when noted \"sharp\" pain in right shoulder ~ 3 months ago (2020).  Pain was improving with physical therapy until returning to work until returning to work ~ 10 days ago noting multiple painful \"pop\" sensations in right shoulder.  Pain located over right superior lateral shoulder, nonradiating.  Additional Features:  Positive: popping and weakness.  Symptoms are better with Ibuprofen and Rest.  Symptoms are worse with: shoulder flexion/abduction, reaching, lifting right shoulder.  Other evaluation and/or treatments so far consists of: Ice, Tylenol, Ibuprofen, Rest and physical therapy, PCP.  Recent imaging completed: No recent imaging completed.  Prior History of related problems: none    Social History: works doing [employer], picking for [employer]    Interim History - 2020  Since last visit on 2020 patient has moderate right shoulder pain over the past ~ 2 - 3 weeks.  Right shoulder subacromial steorid injection completed on 20 provided good relief for ~ 4 weeks.  Patient notes that majority of pain located over anterior shoulder with radiating to upper trapezius, clavicle region.  She has been continuing with physical therapy with some benefit.  Still not working, last excused from work on 10/14/20 by PCP - Dr Yeung.  No new injury in the interim.    Interim " "History - December 1, 2020  Since last visit on 11/3/2020 patient has moderate right posterior shoulder pain.  Patient reports that shoulder pain and strength are progressing with physical therapy.  Notes overall fatigue after ~ 2 - 3 hours of use around her home.  No new injury in the interim.    Interim History - January 14, 2021  Since last visit on 12/1/2020 patient has moderate right posterior lateral shoulder pain.  She reports continued discomfort with reaching overhead, behind back, and picking up objects from ground.  Continues working with physical therapy.  No interim injury.       Review of Systems  Musculoskeletal: as above  Remainder of review of systems is negative including constitutional, CV, pulmonary, GI, Skin and Neurologic except as noted in HPI or medical history.    Past Medical History:   Diagnosis Date     Papanicolaou smear of cervix with atypical squamous cells of undetermined significance (ASC-US) 9/22/16     No past surgical history on file.  Family History   Problem Relation Age of Onset     Hypertension Mother      GERD Mother      Congenital Anomalies Mother         Spinal Bifada     Anxiety Disorder Mother      Depression Father      Diabetes Paternal Grandmother        Objective  /80   Ht 1.6 m (5' 3\")   Wt 89.8 kg (198 lb)   BMI 35.07 kg/m        General: healthy, alert and in no distress      HEENT: no scleral icterus or conjunctival erythema     Skin: no suspicious lesions or rash. No jaundice.     CV: regular rhythm by palpation, 2+ distal pulses, no pedal edema      Resp: normal respiratory effort without conversational dyspnea     Psych: normal mood and affect      Gait: nonantalgic, appropriate coordination and balance     Neuro: normal light touch sensory exam of the extremities. Motor strength as noted below       Right Shoulder exam    ROM:        Full active and passive ROM with flexion, extension, abduction, internal and external rotation.       asymmetric " "scapular motion on R, improving       Painful mildly terminal flexion and abduction, ER    Tender:        subacromial space right, mild, improved       Lateral deltoid improved       GH joint anteriorly improving       Long head of biceps improving    Non Tender:       remainder of shoulder       sternoclavicular joint, resolved from previous       acromioclavicular joint       periscapular region    Strength:        abduction 5-/5       internal rotation 5/5       external rotation 5-/5       adduction 5/5    Impingement testing:       negative Neer       positive (+) Gastelum, improving       positive (+) empty can, but less pain       neg (-) crossover       neg (-) crank    Stability testing:       neg (-) relocation       neg (-) anterior glide       neg (-) sulcus sign    Skin:       no visible deformities       well perfused       capillary refill brisk    Sensation:        normal sensation over shoulder and upper extremity     Radiology  No imaging today    Assessment:  1. Acute pain of right shoulder    2. Subacromial bursitis of right shoulder joint    3. Biceps tendonitis on right    4. Work related injury        Plan:  Discussed the assessment with the patient.  Follow up: 6 weeks  Letter updated for work:  \"Deborah was seen in my office today for evaluation of a right shoulder injury that occurred at work on June 26, 2020.  She has been evaluated and treated with Tylenol,  physical therapy, steroid injection around the rotator cuff, as well as work restrictions.  Currently there are limitations on diagnostic imaging of her shoulder.  Based on her exam, she has signs of ongoing but improving rotator cuff tendinitis/bursitis.  There is still no clear insufficiency of her rotator cuff on exam.  She still ongoing pain in her biceps tendon and glenohumeral joint.  She will continue to work on this with rest from painful activity and ongoing physical therapy.  We reviewed the option for a glenohumeral joint vs " "biceps tendon sheath injection, but we will defer those for now and hope to avoid.  I will assess her progress in another 6 weeks, she will follow up with me at that time.  I recommend no work above shoulder height with her right arm, and a lift/carry/push/pull restriction of 10 pounds with her right arm.  She may be able to tolerate repetitive activity with the right arm for up to 8 hours within the above restrictions at this time. She should be medically excused if there is no available work within these restrictions.  Updated recommendations will be provided at her next visit, sooner if needed.\"  Letter can be updated as needed if her function improves or worsens, restrictions lessened this visit based on recent progress  No imaging today as patient is pregnant, defer MRI as well, can consider in the future if sx remain labile, but still improving albeit slowly  No concerning signs for RTC insufficiency, will consider in the future for labile/worsening sx  Trial of one-time CSI was helpful to limit NSAID use and provide local relief, reviewed potential option to try around biceps tendon or more likely GH joint in the future, but hope to avoid, still improved pain at this location  Advised trying Voltaren gel for some antiinflammatory benefit, safe to use topical NSAID  Continue PT  Supportive care reviewed  All questions were answered today  Contact us with additional questions or concerns  Signs and sx of concern reviewed      Garrett Dixon DO, CALENORE  Primary Care Sports Medicine  [facility]             Disclaimer: This note consists of symbols derived from keyboarding, dictation and/or voice recognition software. As a result, there may be errors in the script that have gone undetected. Please consider this when interpreting information found in this chart.      Again, thank you for allowing me to participate in the care of your patient.        Sincerely,        Garrett Dixon, " DO

## 2021-01-14 NOTE — LETTER
January 14, 2021      Deborah was seen in my office today for evaluation of a right shoulder injury that occurred at work on June 26, 2020.  She has been evaluated and treated with Tylenol,  physical therapy, steroid injection around the rotator cuff, as well as work restrictions.  Currently there are limitations on diagnostic imaging of her shoulder.  Based on her exam, she has signs of ongoing but improving rotator cuff tendinitis/bursitis.  There is still no clear insufficiency of her rotator cuff on exam.  She still ongoing pain in her biceps tendon and glenohumeral joint.  She will continue to work on this with rest from painful activity and ongoing physical therapy.  We reviewed the option for a glenohumeral joint vs biceps tendon sheath injection, but we will defer those for now and hope to avoid.  I will assess her progress in another 6 weeks, she will follow up with me at that time.  I recommend no work above shoulder height with her right arm, and a lift/carry/push/pull restriction of 10 pounds with her right arm.  She may be able to tolerate repetitive activity with the right arm for up to 8 hours within the above restrictions at this time. She should be medically excused if there is no available work within these restrictions.  Updated recommendations will be provided at her next visit, sooner if needed.      Garrett Zambrano, , CALENORE  Primary Care Sports Medicine  [facility]

## 2021-01-14 NOTE — PROGRESS NOTES
"Deborah Swartz  :  1995  DOS: 21  MRN: [id number]    Sports Medicine Clinic Visit    PCP: No primary care provider on file.    Deborah Swartz is a 25 year old Right hand dominant female who is seen in consultation at the request of  Aaron Ewing M.D. presenting with right shoulder injury.    Injury: At work - reaching with right arm extended in front of her when noted \"sharp\" pain in right shoulder ~ 3 months ago (2020).  Pain was improving with physical therapy until returning to work until returning to work ~ 10 days ago noting multiple painful \"pop\" sensations in right shoulder.  Pain located over right superior lateral shoulder, nonradiating.  Additional Features:  Positive: popping and weakness.  Symptoms are better with Ibuprofen and Rest.  Symptoms are worse with: shoulder flexion/abduction, reaching, lifting right shoulder.  Other evaluation and/or treatments so far consists of: Ice, Tylenol, Ibuprofen, Rest and physical therapy, PCP.  Recent imaging completed: No recent imaging completed.  Prior History of related problems: none    Social History: works doing [employer], picking for [employer]    Interim History - 2020  Since last visit on 2020 patient has moderate right shoulder pain over the past ~ 2 - 3 weeks.  Right shoulder subacromial steorid injection completed on 20 provided good relief for ~ 4 weeks.  Patient notes that majority of pain located over anterior shoulder with radiating to upper trapezius, clavicle region.  She has been continuing with physical therapy with some benefit.  Still not working, last excused from work on 10/14/20 by PCP - Dr Yeung.  No new injury in the interim.    Interim History - 2020  Since last visit on 11/3/2020 patient has moderate right posterior shoulder pain.  Patient reports that shoulder pain and strength are progressing with physical therapy.  Notes overall fatigue after ~ 2 - 3 hours of use around her " "home.  No new injury in the interim.    Interim History - January 14, 2021  Since last visit on 12/1/2020 patient has moderate right posterior lateral shoulder pain.  She reports continued discomfort with reaching overhead, behind back, and picking up objects from ground.  Continues working with physical therapy.  No interim injury.       Review of Systems  Musculoskeletal: as above  Remainder of review of systems is negative including constitutional, CV, pulmonary, GI, Skin and Neurologic except as noted in HPI or medical history.    Past Medical History:   Diagnosis Date     Papanicolaou smear of cervix with atypical squamous cells of undetermined significance (ASC-US) 9/22/16     No past surgical history on file.  Family History   Problem Relation Age of Onset     Hypertension Mother      GERD Mother      Congenital Anomalies Mother         Spinal Bifada     Anxiety Disorder Mother      Depression Father      Diabetes Paternal Grandmother        Objective  /80   Ht 1.6 m (5' 3\")   Wt 89.8 kg (198 lb)   BMI 35.07 kg/m        General: healthy, alert and in no distress      HEENT: no scleral icterus or conjunctival erythema     Skin: no suspicious lesions or rash. No jaundice.     CV: regular rhythm by palpation, 2+ distal pulses, no pedal edema      Resp: normal respiratory effort without conversational dyspnea     Psych: normal mood and affect      Gait: nonantalgic, appropriate coordination and balance     Neuro: normal light touch sensory exam of the extremities. Motor strength as noted below       Right Shoulder exam    ROM:        Full active and passive ROM with flexion, extension, abduction, internal and external rotation.       asymmetric scapular motion on R, improving       Painful mildly terminal flexion and abduction, ER    Tender:        subacromial space right, mild, improved       Lateral deltoid improved       GH joint anteriorly improving       Long head of biceps improving    Non " "Tender:       remainder of shoulder       sternoclavicular joint, resolved from previous       acromioclavicular joint       periscapular region    Strength:        abduction 5-/5       internal rotation 5/5       external rotation 5-/5       adduction 5/5    Impingement testing:       negative Neer       positive (+) Gastelum, improving       positive (+) empty can, but less pain       neg (-) crossover       neg (-) crank    Stability testing:       neg (-) relocation       neg (-) anterior glide       neg (-) sulcus sign    Skin:       no visible deformities       well perfused       capillary refill brisk    Sensation:        normal sensation over shoulder and upper extremity     Radiology  No imaging today    Assessment:  1. Acute pain of right shoulder    2. Subacromial bursitis of right shoulder joint    3. Biceps tendonitis on right    4. Work related injury        Plan:  Discussed the assessment with the patient.  Follow up: 6 weeks  Letter updated for work:  \"Deborah was seen in my office today for evaluation of a right shoulder injury that occurred at work on June 26, 2020.  She has been evaluated and treated with Tylenol,  physical therapy, steroid injection around the rotator cuff, as well as work restrictions.  Currently there are limitations on diagnostic imaging of her shoulder.  Based on her exam, she has signs of ongoing but improving rotator cuff tendinitis/bursitis.  There is still no clear insufficiency of her rotator cuff on exam.  She still ongoing pain in her biceps tendon and glenohumeral joint.  She will continue to work on this with rest from painful activity and ongoing physical therapy.  We reviewed the option for a glenohumeral joint vs biceps tendon sheath injection, but we will defer those for now and hope to avoid.  I will assess her progress in another 6 weeks, she will follow up with me at that time.  I recommend no work above shoulder height with her right arm, and a " "lift/carry/push/pull restriction of 10 pounds with her right arm.  She may be able to tolerate repetitive activity with the right arm for up to 8 hours within the above restrictions at this time. She should be medically excused if there is no available work within these restrictions.  Updated recommendations will be provided at her next visit, sooner if needed.\"  Letter can be updated as needed if her function improves or worsens, restrictions lessened this visit based on recent progress  No imaging today as patient is pregnant, defer MRI as well, can consider in the future if sx remain labile, but still improving albeit slowly  No concerning signs for RTC insufficiency, will consider in the future for labile/worsening sx  Trial of one-time CSI was helpful to limit NSAID use and provide local relief, reviewed potential option to try around biceps tendon or more likely GH joint in the future, but hope to avoid, still improved pain at this location  Advised trying Voltaren gel for some antiinflammatory benefit, safe to use topical NSAID  Continue PT  Supportive care reviewed  All questions were answered today  Contact us with additional questions or concerns  Signs and sx of concern reviewed      Garrett Dixon DO, EB  Primary Care Sports Medicine  [facility]             Disclaimer: This note consists of symbols derived from keyboarding, dictation and/or voice recognition software. As a result, there may be errors in the script that have gone undetected. Please consider this when interpreting information found in this chart.  "

## 2021-01-14 NOTE — NURSING NOTE
Workability were received from [employer].  Forms were completed in clinic today.  Copy of forms were given to patient.    Forms faxed to number below. [phone]    Slick Nunn ATC

## 2021-01-18 ENCOUNTER — OFFICE VISIT - HEALTHEAST (OUTPATIENT)
Dept: PHYSICAL THERAPY | Facility: REHABILITATION | Age: 26
End: 2021-01-18

## 2021-01-18 DIAGNOSIS — M25.511 RIGHT SHOULDER PAIN, UNSPECIFIED CHRONICITY: ICD-10-CM

## 2021-01-26 ENCOUNTER — OFFICE VISIT - HEALTHEAST (OUTPATIENT)
Dept: PHYSICAL THERAPY | Facility: REHABILITATION | Age: 26
End: 2021-01-26

## 2021-01-26 DIAGNOSIS — M25.511 RIGHT SHOULDER PAIN, UNSPECIFIED CHRONICITY: ICD-10-CM

## 2021-01-29 ENCOUNTER — AMBULATORY - HEALTHEAST (OUTPATIENT)
Dept: OBGYN | Facility: HOSPITAL | Age: 26
End: 2021-01-29

## 2021-01-29 DIAGNOSIS — Z11.59 ENCOUNTER FOR SCREENING FOR OTHER VIRAL DISEASES: ICD-10-CM

## 2021-02-07 ENCOUNTER — HEALTH MAINTENANCE LETTER (OUTPATIENT)
Age: 26
End: 2021-02-07

## 2021-02-09 ENCOUNTER — OFFICE VISIT - HEALTHEAST (OUTPATIENT)
Dept: PHYSICAL THERAPY | Facility: REHABILITATION | Age: 26
End: 2021-02-09

## 2021-02-09 DIAGNOSIS — M25.511 RIGHT SHOULDER PAIN, UNSPECIFIED CHRONICITY: ICD-10-CM

## 2021-02-11 ENCOUNTER — RECORDS - HEALTHEAST (OUTPATIENT)
Dept: ADMINISTRATIVE | Facility: OTHER | Age: 26
End: 2021-02-11

## 2021-02-11 ASSESSMENT — MIFFLIN-ST. JEOR
SCORE: 1661.68
SCORE: 1661.68

## 2021-02-12 ENCOUNTER — ANESTHESIA - HEALTHEAST (OUTPATIENT)
Dept: OBGYN | Facility: HOSPITAL | Age: 26
End: 2021-02-12

## 2021-02-12 ENCOUNTER — SURGERY - HEALTHEAST (OUTPATIENT)
Dept: OBGYN | Facility: HOSPITAL | Age: 26
End: 2021-02-12

## 2021-02-13 ENCOUNTER — AMBULATORY - HEALTHEAST (OUTPATIENT)
Dept: FAMILY MEDICINE | Facility: CLINIC | Age: 26
End: 2021-02-13

## 2021-02-13 ENCOUNTER — COMMUNICATION - HEALTHEAST (OUTPATIENT)
Dept: SCHEDULING | Facility: CLINIC | Age: 26
End: 2021-02-13

## 2021-02-15 ENCOUNTER — COMMUNICATION - HEALTHEAST (OUTPATIENT)
Dept: SCHEDULING | Facility: CLINIC | Age: 26
End: 2021-02-15

## 2021-02-16 ENCOUNTER — COMMUNICATION - HEALTHEAST (OUTPATIENT)
Dept: ADMINISTRATIVE | Facility: CLINIC | Age: 26
End: 2021-02-16

## 2021-02-16 DIAGNOSIS — Z32.01 PREGNANCY TEST POSITIVE: ICD-10-CM

## 2021-02-16 DIAGNOSIS — F41.1 GAD (GENERALIZED ANXIETY DISORDER): ICD-10-CM

## 2021-02-17 ENCOUNTER — SURGERY - HEALTHEAST (OUTPATIENT)
Dept: OBGYN | Facility: HOSPITAL | Age: 26
End: 2021-02-17
Payer: COMMERCIAL

## 2021-02-17 ENCOUNTER — AMBULATORY - HEALTHEAST (OUTPATIENT)
Dept: PEDIATRICS | Facility: CLINIC | Age: 26
End: 2021-02-17

## 2021-02-22 ENCOUNTER — AMBULATORY - HEALTHEAST (OUTPATIENT)
Dept: MULTI SPECIALTY CLINIC | Facility: CLINIC | Age: 26
End: 2021-02-22

## 2021-05-13 ENCOUNTER — ANCILLARY PROCEDURE (OUTPATIENT)
Dept: GENERAL RADIOLOGY | Facility: CLINIC | Age: 26
End: 2021-05-13
Attending: FAMILY MEDICINE
Payer: OTHER MISCELLANEOUS

## 2021-05-13 ENCOUNTER — OFFICE VISIT (OUTPATIENT)
Dept: ORTHOPEDICS | Facility: CLINIC | Age: 26
End: 2021-05-13
Payer: COMMERCIAL

## 2021-05-13 VITALS
HEIGHT: 63 IN | WEIGHT: 196 LBS | BODY MASS INDEX: 34.73 KG/M2 | DIASTOLIC BLOOD PRESSURE: 72 MMHG | SYSTOLIC BLOOD PRESSURE: 109 MMHG
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
## 2021-05-13 DIAGNOSIS — M75.51 SUBACROMIAL BURSITIS OF RIGHT SHOULDER JOINT: ICD-10-CM

## 2021-05-13 DIAGNOSIS — M25.511 PAIN IN JOINT OF RIGHT SHOULDER: ICD-10-CM

## 2021-05-13 DIAGNOSIS — M75.21 BICEPS TENDONITIS ON RIGHT: ICD-10-CM

## 2021-05-13 PROCEDURE — 76942 ECHO GUIDE FOR BIOPSY: CPT | Performed by: FAMILY MEDICINE

## 2021-05-13 PROCEDURE — 99214 OFFICE O/P EST MOD 30 MIN: CPT | Mod: 25 | Performed by: FAMILY MEDICINE

## 2021-05-13 PROCEDURE — 73030 X-RAY EXAM OF SHOULDER: CPT | Mod: RT | Performed by: RADIOLOGY

## 2021-05-13 PROCEDURE — 20550 NJX 1 TENDON SHEATH/LIGAMENT: CPT | Mod: RT | Performed by: FAMILY MEDICINE

## 2021-05-13 RX ADMIN — TRIAMCINOLONE ACETONIDE 40 MG: 40 INJECTION, SUSPENSION INTRA-ARTICULAR; INTRAMUSCULAR at 14:30

## 2021-05-13 RX ADMIN — LIDOCAINE HYDROCHLORIDE 2 ML: 10 INJECTION, SOLUTION INFILTRATION; PERINEURAL at 14:30

## 2021-05-13 ASSESSMENT — MIFFLIN-ST. JEOR: SCORE: 1603.18

## 2021-05-13 NOTE — PROGRESS NOTES
"Deborah Swartz  :  1995  DOS: 21  MRN: [id number]    Sports Medicine Clinic Visit    PCP: No primary care provider on file.    Deborah Swartz is a 25 year old Right hand dominant female who is seen in consultation at the request of  Aaron Ewing M.D. presenting with right shoulder injury.    Injury: At work - reaching with right arm extended in front of her when noted \"sharp\" pain in right shoulder ~ 3 months ago (2020).  Pain was improving with physical therapy until returning to work until returning to work ~ 10 days ago noting multiple painful \"pop\" sensations in right shoulder.  Pain located over right superior lateral shoulder, nonradiating.  Additional Features:  Positive: popping and weakness.  Symptoms are better with Ibuprofen and Rest.  Symptoms are worse with: shoulder flexion/abduction, reaching, lifting right shoulder.  Other evaluation and/or treatments so far consists of: Ice, Tylenol, Ibuprofen, Rest and physical therapy, PCP.  Recent imaging completed: No recent imaging completed.  Prior History of related problems: none    Social History: works doing [employer], picking for [employer]    Interim History - 2020  Since last visit on 2020 patient has moderate right shoulder pain over the past ~ 2 - 3 weeks.  Right shoulder subacromial steorid injection completed on 20 provided good relief for ~ 4 weeks.  Patient notes that majority of pain located over anterior shoulder with radiating to upper trapezius, clavicle region.  She has been continuing with physical therapy with some benefit.  Still not working, last excused from work on 10/14/20 by PCP - Dr Yeung.  No new injury in the interim.    Interim History - 2020  Since last visit on 11/3/2020 patient has moderate right posterior shoulder pain.  Patient reports that shoulder pain and strength are progressing with physical therapy.  Notes overall fatigue after ~ 2 - 3 hours of use around her " "home.  No new injury in the interim.    Interim History - January 14, 2021  Since last visit on 12/1/2020 patient has moderate right posterior lateral shoulder pain.  She reports continued discomfort with reaching overhead, behind back, and picking up objects from ground.  Continues working with physical therapy.  No interim injury.       Interim History - May 13, 2021  Since last visit on 1/14/2021 patient has mild-moderate right anterior and posterior lateral shoulder pain.  Patient reports that she has completed at trial of return to work over the past ~ 2 weeks.  She notes that after one week, she was having moderate-severe aching pain.  She was sent home from work earlier this week due to pain.  No interim injury.         Review of Systems  Musculoskeletal: as above  Remainder of review of systems is negative including constitutional, CV, pulmonary, GI, Skin and Neurologic except as noted in HPI or medical history.    Past Medical History:   Diagnosis Date     Papanicolaou smear of cervix with atypical squamous cells of undetermined significance (ASC-US) 9/22/16     No past surgical history on file.  Family History   Problem Relation Age of Onset     Hypertension Mother      GERD Mother      Congenital Anomalies Mother         Spinal Bifada     Anxiety Disorder Mother      Depression Father      Diabetes Paternal Grandmother        Objective  /72   Ht 1.6 m (5' 3\")   Wt 88.9 kg (196 lb)   BMI 34.72 kg/m        General: healthy, alert and in no distress      HEENT: no scleral icterus or conjunctival erythema     Skin: no suspicious lesions or rash. No jaundice.     CV: regular rhythm by palpation, 2+ distal pulses, no pedal edema      Resp: normal respiratory effort without conversational dyspnea     Psych: normal mood and affect      Gait: nonantalgic, appropriate coordination and balance     Neuro: normal light touch sensory exam of the extremities. Motor strength as noted below       Right Shoulder " exam    ROM:        Full active and passive ROM with flexion, extension, abduction, internal and external rotation.       asymmetric scapular motion on R, improving       Painful mildly terminal flexion and abduction, ER    Tender:        subacromial space right,mild, improved       Lateral deltoid improved       GH joint anteriorly improving but still mild       Long head of biceps moderate    Non Tender:       remainder of shoulder       sternoclavicular joint, resolved from previous       acromioclavicular joint       periscapular region    Strength:        abduction 5-/5       internal rotation 5/5       external rotation 5/5       adduction 5/5    Impingement testing:       negative Neer       positive (+) Gastelum, improving       positive (+) empty can, mild only       neg (-) crossover       neg (-) crank       Painful SPeed's    Stability testing:       neg (-) relocation       neg (-) anterior glide       neg (-) sulcus sign    Skin:       no visible deformities       well perfused       capillary refill brisk    Sensation:        normal sensation over shoulder and upper extremity     Radiology  XR SHOULDER RIGHT G/E 3 VIEWS 5/13/2021 1:48 PM      HISTORY: Work related injury; Pain in joint of right shoulder                                                               IMPRESSION: Negative exam.    Hand / Upper Extremity Injection/Arthrocentesis    Date/Time: 5/13/2021 2:30 PM  Performed by: Garrett Dixon DO  Authorized by: Garrett Dixon DO     Indications:  Therapeutic, diagnostic and pain  Needle Size:  25 G  Guidance: ultrasound    Approach comment:  Anterior   Condition comment:  Right Shoulder Proximal biceps tendon sheath    Medications:  40 mg triamcinolone 40 MG/ML; 2 mL lidocaine 1 %  Outcome:  Tolerated well, no immediate complications  Procedure discussed: discussed risks, benefits, and alternatives    Consent Given by:  Patient  Timeout: timeout called immediately prior to  procedure    Prep: patient was prepped and draped in usual sterile fashion        Assessment:  1. Work related injury    2. Pain in joint of right shoulder    3. Subacromial bursitis of right shoulder joint    4. Biceps tendonitis on right        Plan:  Discussed the assessment with the patient.  Follow up: 6 weeks  Forms updated for work  US guided biceps tendon sheath CSI today for diagnositc and hopefully therapeutic value  Consider MRI of the shoulder if injection not helpful, or only very temporary relief  PT options and strategies reviewed  Advised continuing Voltaren gel for some pain/antiinflammatory benefit, safe to use topical NSAID  Supportive care reviewed  Expectations and goals of CSI reviewed  Often 2-3 days for steroid effect, and can take up to two weeks for maximum effect  We discussed modified progressive pain-free activity as tolerated  Do not overuse in first two weeks if feeling better due to concern for vulnerability while steroid is working  Supportive care reviewed  All questions were answered today  Contact us with additional questions or concerns  Signs and sx of concern reviewed      Garrett Dixon DO, CALENORE  Primary Care Sports Medicine  [facility]       Time spent in chart review, one-on-one evaluation, discussion with patient regarding nature of problem, course, prior treatments, and therapeutic options, share-decision making, procedures and referrals as appropriate/documented, and charting related to the visit: 34 minutes      Disclaimer: This note consists of symbols derived from keyboarding, dictation and/or voice recognition software. As a result, there may be errors in the script that have gone undetected. Please consider this when interpreting information found in this chart.

## 2021-05-13 NOTE — LETTER
"    2021         RE: Deborah Swartz  [address]  [place] [address]        Dear Colleague,    Thank you for referring your patient, Deborah Swartz, to the [facility] [place]. Please see a copy of my visit note below.    Deborah Swartz  :  1995  DOS: 21  MRN: [id number]    Sports Medicine Clinic Visit    PCP: No primary care provider on file.    Deborah Swartz is a 25 year old Right hand dominant female who is seen in consultation at the request of  Aaron Ewing M.D. presenting with right shoulder injury.    Injury: At work - reaching with right arm extended in front of her when noted \"sharp\" pain in right shoulder ~ 3 months ago (2020).  Pain was improving with physical therapy until returning to work until returning to work ~ 10 days ago noting multiple painful \"pop\" sensations in right shoulder.  Pain located over right superior lateral shoulder, nonradiating.  Additional Features:  Positive: popping and weakness.  Symptoms are better with Ibuprofen and Rest.  Symptoms are worse with: shoulder flexion/abduction, reaching, lifting right shoulder.  Other evaluation and/or treatments so far consists of: Ice, Tylenol, Ibuprofen, Rest and physical therapy, PCP.  Recent imaging completed: No recent imaging completed.  Prior History of related problems: none    Social History: works doing [employer], picking for [employer]    Interim History - 2020  Since last visit on 2020 patient has moderate right shoulder pain over the past ~ 2 - 3 weeks.  Right shoulder subacromial steorid injection completed on 20 provided good relief for ~ 4 weeks.  Patient notes that majority of pain located over anterior shoulder with radiating to upper trapezius, clavicle region.  She has been continuing with physical therapy with some benefit.  Still not working, last excused from work on 10/14/20 by PCP - Dr Yeung.  No new injury in the interim.    Interim " "History - December 1, 2020  Since last visit on 11/3/2020 patient has moderate right posterior shoulder pain.  Patient reports that shoulder pain and strength are progressing with physical therapy.  Notes overall fatigue after ~ 2 - 3 hours of use around her home.  No new injury in the interim.    Interim History - January 14, 2021  Since last visit on 12/1/2020 patient has moderate right posterior lateral shoulder pain.  She reports continued discomfort with reaching overhead, behind back, and picking up objects from ground.  Continues working with physical therapy.  No interim injury.       Interim History - May 13, 2021  Since last visit on 1/14/2021 patient has mild-moderate right anterior and posterior lateral shoulder pain.  Patient reports that she has completed at trial of return to work over the past ~ 2 weeks.  She notes that after one week, she was having moderate-severe aching pain.  She was sent home from work earlier this week due to pain.  No interim injury.         Review of Systems  Musculoskeletal: as above  Remainder of review of systems is negative including constitutional, CV, pulmonary, GI, Skin and Neurologic except as noted in HPI or medical history.    Past Medical History:   Diagnosis Date     Papanicolaou smear of cervix with atypical squamous cells of undetermined significance (ASC-US) 9/22/16     No past surgical history on file.  Family History   Problem Relation Age of Onset     Hypertension Mother      GERD Mother      Congenital Anomalies Mother         Spinal Bifada     Anxiety Disorder Mother      Depression Father      Diabetes Paternal Grandmother        Objective  /72   Ht 1.6 m (5' 3\")   Wt 88.9 kg (196 lb)   BMI 34.72 kg/m        General: healthy, alert and in no distress      HEENT: no scleral icterus or conjunctival erythema     Skin: no suspicious lesions or rash. No jaundice.     CV: regular rhythm by palpation, 2+ distal pulses, no pedal edema      Resp: normal " respiratory effort without conversational dyspnea     Psych: normal mood and affect      Gait: nonantalgic, appropriate coordination and balance     Neuro: normal light touch sensory exam of the extremities. Motor strength as noted below       Right Shoulder exam    ROM:        Full active and passive ROM with flexion, extension, abduction, internal and external rotation.       asymmetric scapular motion on R, improving       Painful mildly terminal flexion and abduction, ER    Tender:        subacromial space right,mild, improved       Lateral deltoid improved       GH joint anteriorly improving but still mild       Long head of biceps moderate    Non Tender:       remainder of shoulder       sternoclavicular joint, resolved from previous       acromioclavicular joint       periscapular region    Strength:        abduction 5-/5       internal rotation 5/5       external rotation 5/5       adduction 5/5    Impingement testing:       negative Neer       positive (+) Gastelum, improving       positive (+) empty can, mild only       neg (-) crossover       neg (-) crank       Painful SPeed's    Stability testing:       neg (-) relocation       neg (-) anterior glide       neg (-) sulcus sign    Skin:       no visible deformities       well perfused       capillary refill brisk    Sensation:        normal sensation over shoulder and upper extremity     Radiology  XR SHOULDER RIGHT G/E 3 VIEWS 5/13/2021 1:48 PM      HISTORY: Work related injury; Pain in joint of right shoulder                                                               IMPRESSION: Negative exam.    Hand / Upper Extremity Injection/Arthrocentesis    Date/Time: 5/13/2021 2:30 PM  Performed by: Garrett Dixon DO  Authorized by: Garrett Dixon DO     Indications:  Therapeutic, diagnostic and pain  Needle Size:  25 G  Guidance: ultrasound    Approach comment:  Anterior   Condition comment:  Right Shoulder Proximal biceps tendon  sheath    Medications:  40 mg triamcinolone 40 MG/ML; 2 mL lidocaine 1 %  Outcome:  Tolerated well, no immediate complications  Procedure discussed: discussed risks, benefits, and alternatives    Consent Given by:  Patient  Timeout: timeout called immediately prior to procedure    Prep: patient was prepped and draped in usual sterile fashion        Assessment:  1. Work related injury    2. Pain in joint of right shoulder    3. Subacromial bursitis of right shoulder joint    4. Biceps tendonitis on right        Plan:  Discussed the assessment with the patient.  Follow up: 6 weeks  Forms updated for work  US guided biceps tendon sheath CSI today for diagnositc and hopefully therapeutic value  Consider MRI of the shoulder if injection not helpful, or only very temporary relief  PT options and strategies reviewed  Advised continuing Voltaren gel for some pain/antiinflammatory benefit, safe to use topical NSAID  Supportive care reviewed  Expectations and goals of CSI reviewed  Often 2-3 days for steroid effect, and can take up to two weeks for maximum effect  We discussed modified progressive pain-free activity as tolerated  Do not overuse in first two weeks if feeling better due to concern for vulnerability while steroid is working  Supportive care reviewed  All questions were answered today  Contact us with additional questions or concerns  Signs and sx of concern reviewed      Garrett Dixon DO, EB  Primary Care Sports Medicine  [facility]       Time spent in chart review, one-on-one evaluation, discussion with patient regarding nature of problem, course, prior treatments, and therapeutic options, share-decision making, procedures and referrals as appropriate/documented, and charting related to the visit: 34 minutes      Disclaimer: This note consists of symbols derived from keyboarding, dictation and/or voice recognition software. As a result, there may be errors in the script that have gone  undetected. Please consider this when interpreting information found in this chart.      Again, thank you for allowing me to participate in the care of your patient.        Sincerely,        Garrett Dixon, DO

## 2021-05-17 ENCOUNTER — COMMUNICATION - HEALTHEAST (OUTPATIENT)
Dept: PHYSICAL THERAPY | Facility: REHABILITATION | Age: 26
End: 2021-05-17

## 2021-05-24 RX ORDER — LIDOCAINE HYDROCHLORIDE 10 MG/ML
2 INJECTION, SOLUTION INFILTRATION; PERINEURAL
Status: DISCONTINUED | OUTPATIENT
Start: 2021-05-13 | End: 2022-01-13 | Stop reason: ALTCHOICE

## 2021-05-24 RX ORDER — TRIAMCINOLONE ACETONIDE 40 MG/ML
40 INJECTION, SUSPENSION INTRA-ARTICULAR; INTRAMUSCULAR
Status: DISCONTINUED | OUTPATIENT
Start: 2021-05-13 | End: 2022-01-13 | Stop reason: ALTCHOICE

## 2021-05-27 ENCOUNTER — OFFICE VISIT (OUTPATIENT)
Dept: ORTHOPEDICS | Facility: CLINIC | Age: 26
End: 2021-05-27
Payer: OTHER MISCELLANEOUS

## 2021-05-27 VITALS
BODY MASS INDEX: 29.48 KG/M2 | HEART RATE: 77 BPM | HEIGHT: 63 IN | DIASTOLIC BLOOD PRESSURE: 68 MMHG | WEIGHT: 166.4 LBS | SYSTOLIC BLOOD PRESSURE: 96 MMHG | OXYGEN SATURATION: 97 %

## 2021-05-27 VITALS
HEART RATE: 76 BPM | OXYGEN SATURATION: 99 % | DIASTOLIC BLOOD PRESSURE: 61 MMHG | TEMPERATURE: 97.9 F | SYSTOLIC BLOOD PRESSURE: 100 MMHG | RESPIRATION RATE: 12 BRPM

## 2021-05-27 VITALS
WEIGHT: 196 LBS | HEIGHT: 63 IN | BODY MASS INDEX: 34.73 KG/M2 | SYSTOLIC BLOOD PRESSURE: 102 MMHG | DIASTOLIC BLOOD PRESSURE: 70 MMHG

## 2021-05-27 DIAGNOSIS — M75.51 SUBACROMIAL BURSITIS OF RIGHT SHOULDER JOINT: ICD-10-CM
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
## 2021-05-27 DIAGNOSIS — M75.21 BICEPS TENDONITIS ON RIGHT: ICD-10-CM

## 2021-05-27 DIAGNOSIS — M79.18 MYALGIA, OTHER SITE: ICD-10-CM

## 2021-05-27 DIAGNOSIS — M25.511 PAIN IN JOINT OF RIGHT SHOULDER: ICD-10-CM

## 2021-05-27 PROCEDURE — 99214 OFFICE O/P EST MOD 30 MIN: CPT | Mod: 25 | Performed by: FAMILY MEDICINE

## 2021-05-27 PROCEDURE — 20553 NJX 1/MLT TRIGGER POINTS 3/>: CPT | Performed by: FAMILY MEDICINE

## 2021-05-27 ASSESSMENT — MIFFLIN-ST. JEOR: SCORE: 1603.18

## 2021-05-27 NOTE — LETTER
May 27, 2021      Deborah is being followed in my clinic for her right shoulder injury that occurred at work on June 26, 2020.  She has been evaluated and treated with Tylenol,  physical therapy, steroid injection around the rotator cuff, as well as work restrictions.  She continues to have signs of improving rotator cuff tendinitis/bursitis. Her ongoing pain in her biceps tendon is improved today after injection last visit.  She will continue to work on this with physical therapy.  I performed trigger point injections today to her upper shoulder musculature to help with residual muscle pain and spasm.  Based on her progress, the following should be her current restrictions: limited work above shoulder height with her right arm if possible, a lift/carry/push/pull restriction of 25 pounds with her right arm.  She may be able to tolerate some repetitive activity with the right arm for up to 10 hours within the above restrictions, but should continue to modify, eliminate, or rotate activities as needed to avoid painful or unsafe activity.  She should have a break of up to 15 minutes every 2-3 hours during her work day.  If her pain increases again will plan for MRI to evaluate for any underlying structural issue.     She will follow up with me in clinic ~ 4 weeks for reassessment.  Patient to continue physical therapy.  Updated recommendations will be provided at her next visit, sooner if needed based on her progress.      Garrett Zambrano, , EB  Primary Care Sports Medicine  [facility]

## 2021-05-27 NOTE — PROGRESS NOTES
"Deborah Swartz  :  1995  DOS: 21  MRN: [id number]    Sports Medicine Clinic Visit    PCP: No primary care provider on file.    Deborah Swartz is a 25 year old Right hand dominant female who is seen in consultation at the request of  Aaron Ewing M.D. presenting with right shoulder injury.    Injury: At work - reaching with right arm extended in front of her when noted \"sharp\" pain in right shoulder ~ 3 months ago (2020).  Pain was improving with physical therapy until returning to work until returning to work ~ 10 days ago noting multiple painful \"pop\" sensations in right shoulder.  Pain located over right superior lateral shoulder, nonradiating.  Additional Features:  Positive: popping and weakness.  Symptoms are better with Ibuprofen and Rest.  Symptoms are worse with: shoulder flexion/abduction, reaching, lifting right shoulder.  Other evaluation and/or treatments so far consists of: Ice, Tylenol, Ibuprofen, Rest and physical therapy, PCP.  Recent imaging completed: No recent imaging completed.  Prior History of related problems: none    Social History: works doing [employer], picking for [employer]    Interim History - 2020  Since last visit on 2020 patient has moderate right shoulder pain over the past ~ 2 - 3 weeks.  Right shoulder subacromial steorid injection completed on 20 provided good relief for ~ 4 weeks.  Patient notes that majority of pain located over anterior shoulder with radiating to upper trapezius, clavicle region.  She has been continuing with physical therapy with some benefit.  Still not working, last excused from work on 10/14/20 by PCP - Dr Yeung.  No new injury in the interim.    Interim History - 2020  Since last visit on 11/3/2020 patient has moderate right posterior shoulder pain.  Patient reports that shoulder pain and strength are progressing with physical therapy.  Notes overall fatigue after ~ 2 - 3 hours of use around her " "home.  No new injury in the interim.    Interim History - January 14, 2021  Since last visit on 12/1/2020 patient has moderate right posterior lateral shoulder pain.  She reports continued discomfort with reaching overhead, behind back, and picking up objects from ground.  Continues working with physical therapy.  No interim injury.       Interim History - May 13, 2021  Since last visit on 1/14/2021 patient has mild-moderate right anterior and posterior lateral shoulder pain.  Patient reports that she has completed at trial of return to work over the past ~ 2 weeks.  She notes that after one week, she was having moderate-severe aching pain.  She was sent home from work earlier this week due to pain.  No interim injury.       Interim History - May 27, 2021  Since last visit on 5/13/2021 patient has mild-moderate right shoulder pain.  Right shoulder proximal biceps tendon steroid injection completed 5.13.21 provided ~ 70% relief.  She is still noting discomfort over superior shoulder with radiation to her upper trapezius, clavicle region.  Generalized aching sensation is much improved.  No interim injury.       Review of Systems  Musculoskeletal: as above  Remainder of review of systems is negative including constitutional, CV, pulmonary, GI, Skin and Neurologic except as noted in HPI or medical history.    Past Medical History:   Diagnosis Date     Papanicolaou smear of cervix with atypical squamous cells of undetermined significance (ASC-US) 9/22/16     No past surgical history on file.  Family History   Problem Relation Age of Onset     Hypertension Mother      GERD Mother      Congenital Anomalies Mother         Spinal Bifada     Anxiety Disorder Mother      Depression Father      Diabetes Paternal Grandmother        Objective  /70   Ht 1.6 m (5' 3\")   Wt 88.9 kg (196 lb)   BMI 34.72 kg/m        General: healthy, alert and in no distress      HEENT: no scleral icterus or conjunctival erythema     Skin: no " suspicious lesions or rash. No jaundice.     CV: regular rhythm by palpation, 2+ distal pulses, no pedal edema      Resp: normal respiratory effort without conversational dyspnea     Psych: normal mood and affect      Gait: nonantalgic, appropriate coordination and balance     Neuro: normal light touch sensory exam of the extremities. Motor strength as noted below       Right Shoulder exam    ROM:        Full active and passive ROM with flexion, extension, abduction, internal and external rotation.       asymmetric scapular motion on R, improving       Painful mildly terminal flexion and abduction, ER    Tender:        subacromial space right,mild, improved       Lateral deltoid improved       GH joint anteriorly improving       Long head of biceps minimal       Focal TTP of the right upper trapezius, posterior scalenes, supraspinatus, levator and medial rhomboids, spasm present, TART changes    Non Tender:       remainder of shoulder       sternoclavicular joint, still resolved from previous       acromioclavicular joint       periscapular region    Strength:        abduction 5-/5       internal rotation 5/5       external rotation 5/5       adduction 5/5    Impingement testing:       negative Neer       positive (+) Gastelum, improving       positive (+) empty can, mild only       neg (-) crossover       neg (-) crank       Minimal positive Speed's     Stability testing:       neg (-) anterior glide       neg (-) sulcus sign    Skin:       no visible deformities       well perfused       capillary refill brisk    Sensation:        normal sensation over shoulder and upper extremity     Radiology  XR SHOULDER RIGHT G/E 3 VIEWS 5/13/2021 1:48 PM      HISTORY: Work related injury; Pain in joint of right shoulder                                                               IMPRESSION: Negative exam.    Procedure  After risks, benefits and complications of trigger point injection were discussed with the patient, a  "consent form was signed and the patient elected to proceed.  Using sterile technique, the area was first prepped with betadyne and an alcohol swab.  A 27 gauge  needle was used to inject a mixture of 0.5 ml of 0.5% marcaine and 0.5 ml of 1% lidocaine into 12 separate trigger point in the right upper trapezius, posterior scalenes, supraspinatus, levator scapula, medial rhomboids. The patient tolerated the procedure well without complications.    Assessment:  1. Work related injury    2. Pain in joint of right shoulder    3. Subacromial bursitis of right shoulder joint    4. Biceps tendonitis on right    5. Myalgia, other site        Plan:  Discussed the assessment with the patient.  Follow up: 2 weeks  Letter updated for work:  \"Deborah is being followed in my clinic for her right shoulder injury that occurred at work on June 26, 2020.  She has been evaluated and treated with Tylenol,  physical therapy, steroid injection around the rotator cuff, as well as work restrictions.  She continues to have signs of improving rotator cuff tendinitis/bursitis. Her ongoing pain in her biceps tendon is improved today after injection last visit.  She will continue to work on this with physical therapy.  I performed trigger point injections today to her upper shoulder musculature to help with residual muscle pain and spasm.  Based on her progress, the following should be her current restrictions: limited work above shoulder height with her right arm if possible, a lift/carry/push/pull restriction of 25 pounds with her right arm.  She may be able to tolerate some repetitive activity with the right arm for up to 10 hours within the above restrictions, but should continue to modify, eliminate, or rotate activities as needed to avoid painful or unsafe activity.  She should have a break of up to 15 minutes every 2-3 hours during her work day.  If her pain increases again will plan for MRI to evaluate for any underlying structural issue. " "    She will follow up with me in clinic ~ 4 weeks for reassessment.  Patient to continue physical therapy.  Updated recommendations will be provided at her next visit, sooner if needed based on her progress.\"  US guided biceps tendon sheath CSI helpful  TPI performed today for muscle pain and spasm   Consider MRI of the shoulder if pain increases again  PT options and strategies reviewed  Advised continuing Voltaren gel for some pain/antiinflammatory benefit, safe to use topical NSAID  Supportive care reviewed  Expectations and goals of TPI reviewed  Supportive care reviewed  All questions were answered today  Contact us with additional questions or concerns  Signs and sx of concern reviewed      Garrett Dixon DO, CALENORE  Primary Care Sports Medicine  [facility]       Time spent in chart review, one-on-one evaluation, discussion with patient regarding nature of problem, course, prior treatments, and therapeutic options, share-decision making, procedures and referrals as appropriate/documented, and charting related to the visit: 32 minutes      Disclaimer: This note consists of symbols derived from keyboarding, dictation and/or voice recognition software. As a result, there may be errors in the script that have gone undetected. Please consider this when interpreting information found in this chart.  "

## 2021-05-27 NOTE — LETTER
"    2021         RE: Deborah Swartz  [address]  [place] [address]        Dear Colleague,    Thank you for referring your patient, Deborah Swartz, to the [facility] [place]. Please see a copy of my visit note below.    Deborah Swartz  :  1995  DOS: 21  MRN: [id number]    Sports Medicine Clinic Visit    PCP: No primary care provider on file.    Deborah Swartz is a 25 year old Right hand dominant female who is seen in consultation at the request of  Aaron Ewing M.D. presenting with right shoulder injury.    Injury: At work - reaching with right arm extended in front of her when noted \"sharp\" pain in right shoulder ~ 3 months ago (2020).  Pain was improving with physical therapy until returning to work until returning to work ~ 10 days ago noting multiple painful \"pop\" sensations in right shoulder.  Pain located over right superior lateral shoulder, nonradiating.  Additional Features:  Positive: popping and weakness.  Symptoms are better with Ibuprofen and Rest.  Symptoms are worse with: shoulder flexion/abduction, reaching, lifting right shoulder.  Other evaluation and/or treatments so far consists of: Ice, Tylenol, Ibuprofen, Rest and physical therapy, PCP.  Recent imaging completed: No recent imaging completed.  Prior History of related problems: none    Social History: works doing [id number], picking for [employer]    Interim History - 2020  Since last visit on 2020 patient has moderate right shoulder pain over the past ~ 2 - 3 weeks.  Right shoulder subacromial steorid injection completed on 20 provided good relief for ~ 4 weeks.  Patient notes that majority of pain located over anterior shoulder with radiating to upper trapezius, clavicle region.  She has been continuing with physical therapy with some benefit.  Still not working, last excused from work on 10/14/20 by PCP - Dr Yeung.  No new injury in the interim.    Interim " History - December 1, 2020  Since last visit on 11/3/2020 patient has moderate right posterior shoulder pain.  Patient reports that shoulder pain and strength are progressing with physical therapy.  Notes overall fatigue after ~ 2 - 3 hours of use around her home.  No new injury in the interim.    Interim History - January 14, 2021  Since last visit on 12/1/2020 patient has moderate right posterior lateral shoulder pain.  She reports continued discomfort with reaching overhead, behind back, and picking up objects from ground.  Continues working with physical therapy.  No interim injury.       Interim History - May 13, 2021  Since last visit on 1/14/2021 patient has mild-moderate right anterior and posterior lateral shoulder pain.  Patient reports that she has completed at trial of return to work over the past ~ 2 weeks.  She notes that after one week, she was having moderate-severe aching pain.  She was sent home from work earlier this week due to pain.  No interim injury.       Interim History - May 27, 2021  Since last visit on 5/13/2021 patient has mild-moderate right shoulder pain.  Right shoulder proximal biceps tendon steroid injection completed 5.13.21 provided ~ 70% relief.  She is still noting discomfort over superior shoulder with radiation to her upper trapezius, clavicle region.  Generalized aching sensation is much improved.  No interim injury.       Review of Systems  Musculoskeletal: as above  Remainder of review of systems is negative including constitutional, CV, pulmonary, GI, Skin and Neurologic except as noted in HPI or medical history.    Past Medical History:   Diagnosis Date     Papanicolaou smear of cervix with atypical squamous cells of undetermined significance (ASC-US) 9/22/16     No past surgical history on file.  Family History   Problem Relation Age of Onset     Hypertension Mother      GERD Mother      Congenital Anomalies Mother         Spinal Bifada     Anxiety Disorder Mother       "Depression Father      Diabetes Paternal Grandmother        Objective  /70   Ht 1.6 m (5' 3\")   Wt 88.9 kg (196 lb)   BMI 34.72 kg/m        General: healthy, alert and in no distress      HEENT: no scleral icterus or conjunctival erythema     Skin: no suspicious lesions or rash. No jaundice.     CV: regular rhythm by palpation, 2+ distal pulses, no pedal edema      Resp: normal respiratory effort without conversational dyspnea     Psych: normal mood and affect      Gait: nonantalgic, appropriate coordination and balance     Neuro: normal light touch sensory exam of the extremities. Motor strength as noted below       Right Shoulder exam    ROM:        Full active and passive ROM with flexion, extension, abduction, internal and external rotation.       asymmetric scapular motion on R, improving       Painful mildly terminal flexion and abduction, ER    Tender:        subacromial space right,mild, improved       Lateral deltoid improved       GH joint anteriorly improving       Long head of biceps minimal       Focal TTP of the right upper trapezius, posterior scalenes, supraspinatus, levator and medial rhomboids, spasm present, TART changes    Non Tender:       remainder of shoulder       sternoclavicular joint, still resolved from previous       acromioclavicular joint       periscapular region    Strength:        abduction 5-/5       internal rotation 5/5       external rotation 5/5       adduction 5/5    Impingement testing:       negative Neer       positive (+) Gastelum, improving       positive (+) empty can, mild only       neg (-) crossover       neg (-) crank       Minimal positive Speed's     Stability testing:       neg (-) anterior glide       neg (-) sulcus sign    Skin:       no visible deformities       well perfused       capillary refill brisk    Sensation:        normal sensation over shoulder and upper extremity     Radiology  XR SHOULDER RIGHT G/E 3 VIEWS 5/13/2021 1:48 PM      HISTORY: " "Work related injury; Pain in joint of right shoulder                                                               IMPRESSION: Negative exam.    Procedure  After risks, benefits and complications of trigger point injection were discussed with the patient, a consent form was signed and the patient elected to proceed.  Using sterile technique, the area was first prepped with betadyne and an alcohol swab.  A 27 gauge  needle was used to inject a mixture of 0.5 ml of 0.5% marcaine and 0.5 ml of 1% lidocaine into 12 separate trigger point in the right upper trapezius, posterior scalenes, supraspinatus, levator scapula, medial rhomboids. The patient tolerated the procedure well without complications.    Assessment:  1. Work related injury    2. Pain in joint of right shoulder    3. Subacromial bursitis of right shoulder joint    4. Biceps tendonitis on right    5. Myalgia, other site        Plan:  Discussed the assessment with the patient.  Follow up: 2 weeks  Letter updated for work:  \"Deborah is being followed in my clinic for her right shoulder injury that occurred at work on June 26, 2020.  She has been evaluated and treated with Tylenol,  physical therapy, steroid injection around the rotator cuff, as well as work restrictions.  She continues to have signs of improving rotator cuff tendinitis/bursitis. Her ongoing pain in her biceps tendon is improved today after injection last visit.  She will continue to work on this with physical therapy.  I performed trigger point injections today to her upper shoulder musculature to help with residual muscle pain and spasm.  Based on her progress, the following should be her current restrictions: limited work above shoulder height with her right arm if possible, a lift/carry/push/pull restriction of 25 pounds with her right arm.  She may be able to tolerate some repetitive activity with the right arm for up to 10 hours within the above restrictions, but should continue to modify, " "eliminate, or rotate activities as needed to avoid painful or unsafe activity.  She should have a break of up to 15 minutes every 2-3 hours during her work day.  If her pain increases again will plan for MRI to evaluate for any underlying structural issue.     She will follow up with me in clinic ~ 4 weeks for reassessment.  Patient to continue physical therapy.  Updated recommendations will be provided at her next visit, sooner if needed based on her progress.\"  US guided biceps tendon sheath CSI helpful  TPI performed today for muscle pain and spasm   Consider MRI of the shoulder if pain increases again  PT options and strategies reviewed  Advised continuing Voltaren gel for some pain/antiinflammatory benefit, safe to use topical NSAID  Supportive care reviewed  Expectations and goals of TPI reviewed  Supportive care reviewed  All questions were answered today  Contact us with additional questions or concerns  Signs and sx of concern reviewed      Garrett Dixon DO, EB  Primary Care Sports Medicine  [facility]       Time spent in chart review, one-on-one evaluation, discussion with patient regarding nature of problem, course, prior treatments, and therapeutic options, share-decision making, procedures and referrals as appropriate/documented, and charting related to the visit: 32 minutes      Disclaimer: This note consists of symbols derived from keyboarding, dictation and/or voice recognition software. As a result, there may be errors in the script that have gone undetected. Please consider this when interpreting information found in this chart.      Again, thank you for allowing me to participate in the care of your patient.        Sincerely,        Garrett Dixon DO    "

## 2021-05-28 ASSESSMENT — ANXIETY QUESTIONNAIRES: GAD7 TOTAL SCORE: 3

## 2021-06-05 VITALS — WEIGHT: 210 LBS | BODY MASS INDEX: 37.2 KG/M2

## 2021-06-05 VITALS — HEIGHT: 63 IN | BODY MASS INDEX: 37.2 KG/M2

## 2021-06-05 NOTE — PROGRESS NOTES
Assessment/Plan:        1. Encounter to establish care    2. Depression, unspecified depression type  Doing well on the current plan  Continue current management  - sertraline (ZOLOFT) 100 MG tablet; Take 1 tablet (100 mg total) by mouth daily.  Dispense: 90 tablet; Refill: 0 follow-up in 3 months, sooner with any issues or concerns      3. Dysuria  - Urinalysis-UC if Indicated   Positive for UTI  Plan:  Bactrim twice a day for 7 days  Close follow up if no significant change or improvement as anticipated.      At the conclusion of the encounter the plan of care, disposition and all questions were answered and reviewed, and the patient acknowledged understanding and was involved in the decision making regarding the overall care plan.     Patient Care Team:  Uli Yeung MD as PCP - General (Family Medicine)          Subjective:    Patient ID:   Deborah Swartz is a 24 y.o. female here for healthcare establishment presenting with her  in 5 month old child.  He is currently on Zoloft 100 mg for treatment of depression the past 5 months.  She states that always had depression but worse since postpartum.  Currently doing well on the 100 mg dosage having no additional issues or concerns regarding this.    She also notes to having dysuria and cloudy urine for the past 4 days went to rule out urinary tract infection.  She has no fever chills or mid back pain.       Review of Systems  Allergy: reviewed  General : negative  Ophthalmic : negative  ENT : negative  Respiratory : no cough, shortness of breath, or wheezing  Cardiovascular : no chest pain or dyspnea on exertion  Gastrointestinal : no abdominal pain, change in bowel habits, or black or bloody stools  Genito-Urinary :  See HPI,   Dermatological : negative    Musculoskeletal : negative  Neurological : negative  Hematological and Lymphatic : negative  Endocrine : negative     The following patient's history were reviewed and updated as appropriate:    "She  has no past medical history on file.  She  has a past surgical history that includes  section.  Her family history includes Diabetes in her mother; Hypertension in her mother.  She  reports that she has been smoking cigarettes. She has never used smokeless tobacco. She reports current alcohol use. She reports that she does not use drugs..      Outpatient Encounter Medications as of 2020   Medication Sig Dispense Refill     sertraline (ZOLOFT) 50 MG tablet Take 100 mg by mouth.       No facility-administered encounter medications on file as of 2020.          Objective:   BP 96/68 (Patient Site: Right Arm, Patient Position: Sitting, Cuff Size: Adult Regular)   Pulse 77   Ht 5' 3\" (1.6 m)   Wt 166 lb 6.4 oz (75.5 kg)   LMP 2020 (Exact Date)   SpO2 97%   Breastfeeding No   BMI 29.48 kg/m        Physical Exam  General Appearance:    Alert, well hydrated, no distress,    Eyes:    PERRL, conjunctiva/corneas clear,    Throat:   Lips, mucosa, and tongue normal; teeth and gums normal   Neck:   Supple, symmetrical, trachea midline, no adenopathy;        thyroid:  No enlargement/tenderness/nodules; no carotid    bruit or JVD   Lungs:     Clear to auscultation bilaterally, respirations unlabored   Heart:    Regular rate and rhythm, S1 and S2 normal, no murmur, rub   or gallop   Abdomen:     Soft, non-tender, normal bowel sounds, no rebound or guarding, no masses, no organomegaly   Extremities:   Extremities normal, atraumatic, no cyanosis or edema   Skin:   Skin color, texture, turgor normal, no rashes or lesions      "

## 2021-06-06 NOTE — TELEPHONE ENCOUNTER
Pt is needing this form completed by 02/28/2020 also notate that she can go back to work on 02/03/2020.

## 2021-06-06 NOTE — PROGRESS NOTES
Assessment/Plan:        1. Depression, unspecified depression type  Discussed to resume Zoloft at much lower dose of 25 mg daily and monitor side effects     - sertraline (ZOLOFT) 25 MG tablet; Take 1 tablet (25 mg total) by mouth daily.  Dispense: 30 tablet; Refill: 0   Follow-up in a month to reassess  FMLA will be addressed according to the information provided    25 minutes or greater were spent with the patient today with greater than 50% of the times spent in counseling and management of care.      Subjective:    Patient ID:   Deborah Swartz is a 24 y.o. female comes in follow-up of depression and med check.  She thinks that might have had a reaction to her antidepressants on 2/9/2020 coming down with headaches and blurry vision.  She was sent home from work and was told to only return once cleared by her doctors.  She has since been off her antidepressant and been off work since February 9, 2020 and plans to return on 3/2/2020.  She has had no symptoms since being off the antidepressant.    She notes to being off her antidepressant for approximately 3 months before resuming at the 100 g dosage, which was not disclosed at her last OV, when she was given a refill and had come in to establish care on 2/5/2020.    She would still like to be treated for her depression, but feels that this point her depression is not as bad as it used to be necessitating a higher dose of medication .      Review of Systems  Allergy: reviewed  General : negative  A complete 5 point review of systems was obtained and is negative other than what is stated in the HPI.       The following patient's history were reviewed and updated as appropriate:   She  has no past medical history on file..      Outpatient Encounter Medications as of 2/21/2020   Medication Sig Dispense Refill     sertraline (ZOLOFT) 100 MG tablet Take 1 tablet (100 mg total) by mouth daily. 90 tablet 0     No facility-administered encounter medications on file as of  2/21/2020.          Objective:   /64 (Patient Site: Right Arm, Patient Position: Sitting, Cuff Size: Adult Regular)   Pulse 68   Wt 168 lb (76.2 kg)   LMP 01/29/2020 (Exact Date)   SpO2 98%   BMI 29.76 kg/m        Physical Exam  General Appearance:    Alert, cooperative, no distress,    Throat:   Lips, mucosa, and tongue normal; teeth and gums normal   Neck:   Supple, symmetrical, trachea midline, no adenopathy;     thyroid:  no enlargement/tenderness/nodules;    Lungs:     Clear to auscultation bilaterally, respirations unlabored    Heart:    Regular rate and rhythm, S1 and S2 normal, no murmur, rub   or gallop   Skin:   Skin color, texture, turgor normal, no rashes or lesions   Psych:     Mental status: Alert, oriented, thought content appropriate, affect: normal, thought content exhibits logical connections

## 2021-06-06 NOTE — TELEPHONE ENCOUNTER
Name of form/paperwork: Other:  [employer]  Date form received by clinic:  2/21/20  When is the form needed by? ASAP  Patient Notified form requests are processed in 3-5 business days: Yes  (If patient needs for sooner, please note that in this message)  Okay to leave a detailed message: Yes     Patient left forms during office visit on 2/21/20, is asking for the status on their completion.Writer unable to locate any copies in patient's chart. Please advise

## 2021-06-08 ENCOUNTER — OFFICE VISIT - HEALTHEAST (OUTPATIENT)
Dept: PHYSICAL THERAPY | Facility: REHABILITATION | Age: 26
End: 2021-06-08

## 2021-06-08 DIAGNOSIS — M25.511 RIGHT SHOULDER PAIN, UNSPECIFIED CHRONICITY: ICD-10-CM

## 2021-06-09 NOTE — PROGRESS NOTES
Assessment:     1. Right shoulder injury, subsequent encounter  Ambulatory referral to Physical Therapy          Plan:     Patient here today with reinjury of her right shoulder which was originally injured at work on 6/28/2020.  Reinjury happened when she attempted to go back to work full duty and needed to lift greater than 25 pounds.  She unfortunately does not have light duty options at work.  Because of this I do not think that she is able to go back to work for the next 2 weeks and a note given to her stating this.  Patient given referral again to physical therapy to have her continue this and I would like her to follow-up with her primary care provider within the next week for reassessment and reevaluation of her workability.  She will remain out of work until that time.      Patient Instructions   You should remain out of work for the next two weeks.  I would like you to do physical therapy and follow up in two weeks with your primary doctor for reevaluation and reassessment of your workability.  Continue with Tylenol or Biofreeze.      Subjective:       24 y.o. female presents for evaluation of right shoulder pain.  She initially injured her right shoulder at work on 6/28/2020 when she was reaching ahead of her self lifting heavy boxes at [employer] where she works.  She felt an immediate pain in her right anterior shoulder.  She was taken out of work for 2 weeks and she was doing physical therapy in addition to using Biofreeze and Tylenol.  She improved significantly with this and was reevaluated and given the go ahead to go back to work full duty without restrictions.  She states that light duty is not an option for her where she works.  Her first day back to work was 2 days ago and she needed to lift greater than 25 pounds at work.  Was able to work for several hours before she started having significant pain again in her right anterior shoulder causing her to stop and ice her shoulder.  Every time she  "lifted with that arm she is continued to have a lot of pain since that time.  She is occasionally feeling a \"click\" in her right shoulder.  She denies any significant weakness.  Pain is exacerbated by reaching out in front of her as well as internal rotation of her shoulder leg when she is driving a car and using that arm to turn the steering wheel.  She is able to raise above her head as well as behind her back without too much difficulty.  Denies any numbness, weakness, tingling, or pain down her arm.        There is no problem list on file for this patient.      History reviewed. No pertinent past medical history.    Past Surgical History:   Procedure Laterality Date      SECTION         Current Outpatient Medications on File Prior to Visit   Medication Sig Dispense Refill     sertraline (ZOLOFT) 25 MG tablet Take 1 tablet (25 mg total) by mouth daily. 30 tablet 0     No current facility-administered medications on file prior to visit.        No Known Allergies    Family History   Problem Relation Age of Onset     Diabetes Mother      Hypertension Mother        Social History     Socioeconomic History     Marital status: Patient Declined     Spouse name: None     Number of children: None     Years of education: None     Highest education level: None   Occupational History     None   Social Needs     Financial resource strain: None     Food insecurity     Worry: None     Inability: None     Transportation needs     Medical: None     Non-medical: None   Tobacco Use     Smoking status: Current Some Day Smoker     Types: Cigarettes     Smokeless tobacco: Never Used   Substance and Sexual Activity     Alcohol use: Yes     Drug use: Never     Sexual activity: None   Lifestyle     Physical activity     Days per week: None     Minutes per session: None     Stress: None   Relationships     Social connections     Talks on phone: None     Gets together: None     Attends [employer] service: None     Active member of " club or organization: None     Attends meetings of clubs or organizations: None     Relationship status: None     Intimate partner violence     Fear of current or ex partner: None     Emotionally abused: None     Physically abused: None     Forced sexual activity: None   Other Topics Concern     None   Social History Narrative     None         Review of Systems  A 12 point comprehensive review of systems was negative except as noted.      Objective:     Vitals:    07/14/20 1330   BP: 106/67   Pulse: 77   Resp: 14   Temp: 98.1  F (36.7  C)   SpO2: 98%      General appearance: alert, appears stated age and cooperative  Extremities: Right shoulder examined and she does not have any tenderness of her clavicle.  There is no other bony tenderness.  No significant AC joint tenderness.  Does have some mild tenderness of her right anterior shoulder which is worse when she reaches forward in front of her with her arm.  She is able to reach behind her back and up above her head.  No tenderness with abduction or abduction against resistance.  No tenderness or weakness with internal or external rotation of her shoulder.  No overlying skin changes or deformity noted.  Impingement sign negative.         This note has been dictated using voice recognition software. Any grammatical or context distortions are unintentional and inherent to the software

## 2021-06-09 NOTE — PATIENT INSTRUCTIONS - HE
You should remain out of work for the next two weeks.  I would like you to do physical therapy and follow up in two weeks with your primary doctor for reevaluation and reassessment of your workability.  Continue with Tylenol or Biofreeze.

## 2021-06-09 NOTE — PROGRESS NOTES
Chief Complaint   Patient presents with     Possible Pregnancy     Last menses: May       ASSESSMENT & PLAN:   Diagnoses and all orders for this visit:    Missed menses  -     Pregnancy (Beta-hCG, Qual), Urine        MDM:  Patient with positive pregnancy test, unclear on exact date of last menstrual period.  Will need ultrasound for dates.    No acute symptoms at this time.    Patient will start gummy or chewable prenatal vitamins.    Recommended [facility] due to history of  last pregnancy.    Supportive care discussed.  See discharge instructions below for specific recommendations given.    At the end of the encounter, I discussed results, diagnosis, medications. Discussed red flags for immediate return to clinic/ER, as well as indications for follow up if no improvement. Patient and/or caregiver understood and agreed to plan. Patient was stable for discharge.    SUBJECTIVE    HPI:  HPI  Deborah Swartz presents to the walk-in clinic with   Chief Complaint   Patient presents with     Possible Pregnancy     Last menses: May     LMP was unknown date in May.  Is unsure if beginning middle or end.    Pregnancy was unplanned, but not unwanted.    Has 1 child requiring emergency  to be born based on head position.     Had pos preg test at home.        Denies: Abdominal pain, spotting      See ROS for additional symptoms and/or pertinent negatives.       History obtained from the patient.    No past medical history on file.    There are no active non-hospital problems to display for this patient.      Family History   Problem Relation Age of Onset     Diabetes Mother      Hypertension Mother        Social History     Tobacco Use     Smoking status: Former Smoker     Types: Cigarettes     Smokeless tobacco: Never Used   Substance Use Topics     Alcohol use: Yes       Review of Systems   Gastrointestinal: Negative for abdominal pain.   Genitourinary: Negative for dysuria, vaginal bleeding, vaginal  discharge and vaginal pain.   Musculoskeletal: Positive for arthralgias (Right shoulder injury, recently seen for this.).       OBJECTIVE    Vitals:    07/20/20 1334   BP: 102/67   Pulse: 72   Resp: 14   Temp: 98.2  F (36.8  C)   TempSrc: Oral   SpO2: 100%       Physical Exam  Constitutional:       General: She is not in acute distress.     Appearance: She is well-developed.   HENT:      Right Ear: External ear normal.      Left Ear: External ear normal.   Eyes:      General:         Right eye: No discharge.         Left eye: No discharge.      Conjunctiva/sclera: Conjunctivae normal.   Pulmonary:      Effort: Pulmonary effort is normal.   Musculoskeletal: Normal range of motion.   Skin:     General: Skin is warm and dry.      Capillary Refill: Capillary refill takes less than 2 seconds.   Neurological:      Mental Status: She is alert and oriented to person, place, and time.   Psychiatric:         Mood and Affect: Mood normal.         Behavior: Behavior normal.         Thought Content: Thought content normal.         Judgment: Judgment normal.         Labs:  No results found for this or any previous visit (from the past 240 hour(s)).      Radiology:    No results found.    PATIENT INSTRUCTIONS:   There are no Patient Instructions on file for this visit.

## 2021-06-10 ENCOUNTER — OFFICE VISIT (OUTPATIENT)
Dept: ORTHOPEDICS | Facility: CLINIC | Age: 26
End: 2021-06-10
Payer: OTHER MISCELLANEOUS

## 2021-06-10 VITALS
HEIGHT: 63 IN | SYSTOLIC BLOOD PRESSURE: 114 MMHG | DIASTOLIC BLOOD PRESSURE: 75 MMHG | BODY MASS INDEX: 34.73 KG/M2 | WEIGHT: 196 LBS

## 2021-06-10 DIAGNOSIS — M75.21 BICEPS TENDONITIS ON RIGHT: ICD-10-CM

## 2021-06-10 DIAGNOSIS — M75.51 SUBACROMIAL BURSITIS OF RIGHT SHOULDER JOINT: ICD-10-CM
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
## 2021-06-10 DIAGNOSIS — M25.511 PAIN IN JOINT OF RIGHT SHOULDER: ICD-10-CM

## 2021-06-10 PROCEDURE — 99213 OFFICE O/P EST LOW 20 MIN: CPT | Performed by: FAMILY MEDICINE

## 2021-06-10 ASSESSMENT — MIFFLIN-ST. JEOR: SCORE: 1603.18

## 2021-06-10 NOTE — LETTER
May 27, 2021      Deborah is being followed in my clinic for her right shoulder injury that occurred at work on June 26, 2020.  She has been evaluated and treated with Tylenol,  physical therapy, steroid injection around the rotator cuff, as well as work restrictions.  She continues to have signs of improving rotator cuff tendinitis/bursitis. Her ongoing pain in her biceps tendon is still improved today after injection as well.  She will continue to work on this with physical therapy.  Based on her progress, the following should be her current restrictions: limited work above shoulder height with her right arm if possible, a lift/carry/push/pull restriction of 25 pounds with her right arm over shoulder height.  She can increase repetitive activity as tolerated with the right arm for up to 10 hours within the above restrictions, but should continue to modify, eliminate, or rotate activities as needed to avoid painful or unsafe activity.   If her pain increases again will plan for MRI to evaluate for any underlying structural issue.     These restrictions will be in place until 8/1/2021 and I will follow up with her in 6 weeks to check her progress.  Patient to continue physical therapy.  Updated recommendations will be provided at her next visit, sooner if needed based on her progress.      Garrett Zambrano DO, EB  Primary Care Sports Medicine  [facility]

## 2021-06-10 NOTE — PROGRESS NOTES
Deborah Swartz  :  1995  DOS: 06/10/21  MRN: [id number]    Sports Medicine Clinic Visit    PCP: No primary care provider on file.    Deborah Swartz is a 25 year old Right hand dominant female who is seen in consultation at the request of  Aaron Ewing M.D. presenting with right shoulder injury.    Interim History - Sharon 10, 2021  Since last visit on 2021 patient has minimal right shoulder pain.  She reports mild discomfort over superior shoulder/AC joint with reaching overhead.  Patient is requesting updated work restrictions today - wanting to return most job tasks.  No interim injury.         Review of Systems  Musculoskeletal: as above  Remainder of review of systems is negative including constitutional, CV, pulmonary, GI, Skin and Neurologic except as noted in HPI or medical history.    Past Medical History:   Diagnosis Date     Papanicolaou smear of cervix with atypical squamous cells of undetermined significance (ASC-US) 16     No past surgical history on file.  Family History   Problem Relation Age of Onset     Hypertension Mother      GERD Mother      Congenital Anomalies Mother         Spinal Bifada     Anxiety Disorder Mother      Depression Father      Diabetes Paternal Grandmother        Objective  There were no vitals taken for this visit.      General: healthy, alert and in no distress      HEENT: no scleral icterus or conjunctival erythema     Skin: no suspicious lesions or rash. No jaundice.     CV: regular rhythm by palpation, 2+ distal pulses, no pedal edema      Resp: normal respiratory effort without conversational dyspnea     Psych: normal mood and affect      Gait: nonantalgic, appropriate coordination and balance     Neuro: normal light touch sensory exam of the extremities. Motor strength as noted below       Right Shoulder exam    ROM:        Full active and passive ROM with flexion, extension, abduction, internal and external rotation.       asymmetric scapular  motion on R, improving       Painful mildly terminal flexion and abduction, ER    Tender:        subacromial space right,mild, improved       Lateral deltoid improved       GH joint anteriorly improving       Long head of biceps minimal       Focal TTP of the right upper trapezius, posterior scalenes, supraspinatus, levator and medial rhomboids, spasm present, TART changes    Non Tender:       remainder of shoulder       sternoclavicular joint, still resolved from previous       acromioclavicular joint       periscapular region    Strength:        abduction 5-/5       internal rotation 5/5       external rotation 5/5       adduction 5/5    Impingement testing:       negative Neer       positive (+) Gastelum, improving       positive (+) empty can, mild only       neg (-) crossover       neg (-) crank       Minimal positive Speed's     Stability testing:       neg (-) anterior glide       neg (-) sulcus sign    Skin:       no visible deformities       well perfused       capillary refill brisk    Sensation:        normal sensation over shoulder and upper extremity     Radiology  XR SHOULDER RIGHT G/E 3 VIEWS 5/13/2021 1:48 PM      HISTORY: Work related injury; Pain in joint of right shoulder                                                               IMPRESSION: Negative exam.    Procedure  After risks, benefits and complications of trigger point injection were discussed with the patient, a consent form was signed and the patient elected to proceed.  Using sterile technique, the area was first prepped with betadyne and an alcohol swab.  A 27 gauge  needle was used to inject a mixture of 0.5 ml of 0.5% marcaine and 0.5 ml of 1% lidocaine into 12 separate trigger point in the right upper trapezius, posterior scalenes, supraspinatus, levator scapula, medial rhomboids. The patient tolerated the procedure well without complications.    Assessment:  No diagnosis found.    Plan:  Discussed the assessment with the  "patient.  Follow up: 2 weeks  Letter updated for work:  \"Deborah is being followed in my clinic for her right shoulder injury that occurred at work on June 26, 2020.  She has been evaluated and treated with Tylenol,  physical therapy, steroid injection around the rotator cuff, as well as work restrictions.  She continues to have signs of improving rotator cuff tendinitis/bursitis. Her ongoing pain in her biceps tendon is improved today after injection last visit.  She will continue to work on this with physical therapy.  I performed trigger point injections today to her upper shoulder musculature to help with residual muscle pain and spasm.  Based on her progress, the following should be her current restrictions: limited work above shoulder height with her right arm if possible, a lift/carry/push/pull restriction of 25 pounds with her right arm.  She may be able to tolerate some repetitive activity with the right arm for up to 10 hours within the above restrictions, but should continue to modify, eliminate, or rotate activities as needed to avoid painful or unsafe activity.  She should have a break of up to 15 minutes every 2-3 hours during her work day.  If her pain increases again will plan for MRI to evaluate for any underlying structural issue.     She will follow up with me in clinic ~ 4 weeks for reassessment.  Patient to continue physical therapy.  Updated recommendations will be provided at her next visit, sooner if needed based on her progress.\"  US guided biceps tendon sheath CSI helpful  TPI performed today for muscle pain and spasm   Consider MRI of the shoulder if pain increases again  PT options and strategies reviewed  Advised continuing Voltaren gel for some pain/antiinflammatory benefit, safe to use topical NSAID  Supportive care reviewed  Expectations and goals of TPI reviewed  Supportive care reviewed  All questions were answered today  Contact us with additional questions or concerns  Signs and sx " of concern reviewed      Garrett Dixon DO, EB  Primary Care Sports Medicine  [facility]       Time spent in chart review, one-on-one evaluation, discussion with patient regarding nature of problem, course, prior treatments, and therapeutic options, share-decision making, procedures and referrals as appropriate/documented, and charting related to the visit: 32 minutes      Disclaimer: This note consists of symbols derived from keyboarding, dictation and/or voice recognition software. As a result, there may be errors in the script that have gone undetected. Please consider this when interpreting information found in this chart.

## 2021-06-10 NOTE — PROGRESS NOTES
[facility]   Shoulder Initial Evaluation    Patient Name: Deborah Swartz  Date of evaluation: 8/13/2020  Visit #1/12  Referral Diagnosis: Right shoulder pain, unspecified chronicity   Precautions: 12 weeks pregnant  Referring provider: Uli Yeung, *  Visit Diagnosis:     ICD-10-CM    1. Right shoulder pain, unspecified chronicity  M25.511        Assessment:   Deborah Swartz is a 25 y.o. female who presents to therapy today with chief complaints of (R) shoulder and arm pain. Onset date of sx was 6/28/20, work related.  Functional impairments include reaching in all directions, lifting, working, sleeping, personal cares, grooming, lying on (R) side.  Clinical findings include decreased shoulder A/PROM, mild pain with resisted testing, mild pain with provocation tests, tenderness of sup shoulder, ant GH joint and ant humeral head.        Pt. is appropriate for skilled PT intervention as outlined in the Plan of Care (POC).  Pt. is a good candidate for skilled PT services to improve pain levels and function.    Goals:  Pt. will demonstrate/verbalize independence in self-management of condition in : 6 weeks  Pt. will be independent with home exercise program in : 6 weeks  Pt. will have improved quality of sleep: getting 75-90% of required amount;in 6 weeks  Patient will work: with restrictions;in 6 weeks  Patient will perform repetitive movement in : arm;for work;with no pain;in 12 weeks    Pt will: be able to reach above shoulder height, overhead and behind back without increased pain in 8 weeks.      Patient's expectations/goals are realistic.    Barriers to Learning or Achieving Goals:  No Barriers.       Plan / Patient Instructions:        Plan of Care:   Authorization / Certification Number of Visits: WC  Communication with: Referral Source  Patient Related Instruction: Nature of Condition;Treatment plan and rationale;Self Care instruction;Basis of treatment;Body mechanics;Posture;Next  steps  Times per Week: 1-2  Number of Weeks: 6-12  Number of Visits: up to 12 PRN  Discharge Planning: HEP, self management  Precautions / Restrictions : 12 weeks pregnant  Therapeutic Exercise: ROM;Stretching;Strengthening  Neuromuscular Reeducation: posture;kinesio tape  Manual Therapy: myofascial release;strain counterstrain      POC and pathology of condition were reviewed with patient.  Pt. is in agreement with the Plan of Care  A Home Exercise Program (HEP) was initiated today.     Plan for next visit: manual therapy, progression of home program     .   Subjective:       Social information:   Living Situation:   Occupation:[employer]   Work Status:Unable to work due to symptoms   Equipment Available: None    History of Present Illness:    Deborah is a 25 y.o. female who presents to therapy today with complaints of (R) sup shoulder, (R) prox arm, (R) neck pain. She reports crepitus. Date of onset/duration of symptoms is 20. Onset was with reaching for a box of cat litter on a low shelf at work, felt a sudden pain. Symptoms are constant, getting worse and not improving. She denies history of similar symptoms. She describes their previous level of function as not limited  She had some PT with benefit and returned to work. She was not able to follow her restrictions and she reinjured it about 3 weeks ago. She is scheduled to be off work until 20. She has not been able to tolerate her home program.      Pain Ratin  Pain rating at best: 6  Pain rating at worst: 10  Pain description: pain    Functional limitations are described as occurring with:   lifting  personal cares dressing lower body, washing hair and dressing upper body  reaching at shoulder height, overhead and behind back  repetitive movements  sleeping  work lifting, repetitive movements  Has difficulty getting to sleep  Difficulty lying on (R) side    Patient reports benefit from:  pain medication, cold, physical therapy    No  past medical history on file.  Past Surgical History:   Procedure Laterality Date      SECTION       There is no problem list on file for this patient.         Objective:      Note: Items left blank indicates the item was not performed or not indicated at the time of the evaluation.    Patient Outcome Measures :    Shoulder Pain and Disability Index (SPADI) in %: 54 (70/130)     Scores range from 0-100%, where a score of 0% represents minimal pain and maximal function. The minimal clincically important difference is a score reduction of 10%.    Shoulder Examination  1. Right shoulder pain, unspecified chronicity       Precautions/Restrictions: 12 weeks pregnant  Involved side: Right  Posture Observation:   Moderate forward head  Increased upper thoracic kyphosis     Shoulder/Thoracic complex: Moderate bilateral scapular protraction       Shoulder/Elbow ROM    Date:      Shoulder and Elbow ROM ( )   AROM in degrees AROM in degrees AROM in degrees    Right Left Right Left Right Left   Shoulder Flexion (0-180 ) 115 (+)        Shoulder Abduction (0-180 ) 140 (+)        Shoulder Extension (0-60 )         Shoulder ER (0-90 ) 80 ERP        Shoulder IR (0-70 ) WNL        Shoulder IR/Ext To T10 (+)        Elbow Flexion (150 )         Elbow Extension (0 )          PROM in degrees PROM in degrees PROM in degrees    Right Left Right Left Right Left   Shoulder Flexion (0-180 ) 158 ERP        Shoulder Abduction (0-180 ) 160 ERP        Shoulder Extension (0-60 )         Shoulder ER (0-90 ) 89 ERP        Shoulder IR (0-70 ) WNL        Elbow Flexion (150 )         Elbow Extension (0 )           Shoulder/Elbow Strength mild pain with all resisted testing  Date:      Shoulder/Elbow Strength (/5)  Manual Muscle Test (MMT) MMT MMT MMT    Right Left Right Left Right Left   Shoulder Flexion 5-        Supraspinatus 5-        Shoulder Abduction 5-        Shoulder Extension         Shoulder External Rotation 5- (+)        Shoulder  "Internal Rotation 5        Elbow Flexion 5        Elbow Extension         Other:         Other:           Shoulder Special Tests     Impingement Cluster Right (+/-) Left (+/-) Rotator Cuff Tests Right (+/-) Left (+/-)   Gastelum-Hunter   Drop Arm Sign     Painful Arc neg  Hornblowers     Infraspinatus Test   ERLS     AC Tests Right (+/-) Left (+/-) IRLS     Active Compression   Labral Tests Right (+/-) Left (+/-)   Crossbody Adduction   Biceps Load Test I Mildly painful    AC Resisted Extension   Jerk Test neg    GH Instability Tests Right (+/-) Left (+/-) Kasandra Test     Sulcus Sign   Biceps Tests Right (+/-) Left (+/-)   Apprehension   Speed     Relocation   Epifanio daniel     Other:   Other:     Other:   Other:       Flexibility:     Palpation: mild tenderness (R) supraspinatus, mod/major tenderness (R) gr/lesser tuberosities, (R) ant GH joint line      Treatment Today    TREATMENT MINUTES COMMENTS   Evaluation 35 Plan of care and goals developed in collaboration with patient.   Discussed findings/condition, related anatomy.   Self-care/ Home management     Manual therapy 10 Induction, indirect, direct techniques utilized as appropriate for optimal tissue release.   MFR/SCS - (R) SLABA(C)-MS, (R) HET(P), (R) HUMAC(P),    Neuromuscular Re-education     Therapeutic Activity     Therapeutic Exercises 10 Exercises per flow sheet.   Exercises:  Exercise #1: scap retraction  Comment #1: 10 x 5\"  Exercise #2: pec stretch over towel roll  Comment #2: review  Exercise #3: rows  Comment #3: 10 x 5\"  Exercise #4: ER/IR isometrics in doorway  Comment #4: 10 x 5\" each way     Gait training     Modality__________________                Total 55    Blank areas are intentional and mean the treatment did not include these items.     PT Evaluation Code: (Please list factors)  Patient History/Comorbidities: Patient is pregnant  Examination: 2  Clinical Presentation: stable  Clinical Decision Making: low    Patient History/  Comorbidities " Examination  (body structures and functions, activity limitations, and/or participation restrictions) Clinical Presentation Clinical Decision Making (Complexity)   No documented Comorbidities or personal factors 1-2 Elements Stable and/or uncomplicated Low   1-2 documented comorbidities or personal factor 3 Elements Evolving clinical presentation with changing characteristics Moderate   3-4 documented comorbidities or personal factors 4 or more Unstable and unpredictable High                Janae Doe PT  8/13/2020

## 2021-06-10 NOTE — PROGRESS NOTES
Assessment/ Plan  1. Acute pain of right shoulder  Work-related injury/reinjury  Possible rotator cuff pathology though the patient does not have a lot of pain with resisted internal/external rotation and empty can sign.  Possible SLAP lesion with clicking popping no negative Mary Ann's test     Either way, restrictions, very restrictive, imposed for 3 additional weeks  Resume physical therapy when able  Follow-up if problems persist, consider MRI for persistent problem, especially with labrum symptoms  Body mass index is 30.2 kg/m .    Subjective  CC:  Chief Complaint   Patient presents with     Shoulder Pain     right should     HPI:  Here for follow-up on shoulder injury.  Initially injured right shoulder at work 6/ when she was lifting heavy boxes at [employer].  Pain immediately felt in the right shoulder given restrictions.  However, she was working, lifting and reinjured the shoulder.  Since about 7/27, she has had increased pain.  She did do some physical therapy for a time, has a break from this in the schedule now, scheduled for back in about 2 weeks.  She was feeling better before the reinjury.  She does feel some popping in the shoulder occasionally and this is quite painful to her.  No history of previous shoulder injuries no numbness or tingling.    There is no problem list on file for this patient.    Current medications reviewed as follows:  Current Outpatient Medications on File Prior to Visit   Medication Sig     prenatal vit no.130-iron-folic (PRENATAL VITAMIN) 27 mg iron- 800 mcg Tab tablet Take 1 tablet by mouth daily.     sertraline (ZOLOFT) 25 MG tablet Take 1 tablet (25 mg total) by mouth daily.     No current facility-administered medications on file prior to visit.      Social History     Tobacco Use   Smoking Status Former Smoker     Types: Cigarettes   Smokeless Tobacco Never Used     Social History     Social History Narrative     Not on file     Patient Care Team:  Uli Yeung  MD as PCP - General (Family Medicine)  Uli Yeung MD as Assigned PCP  ROS  Otherwise negative      Objective  Physical Exam  Vitals:    08/07/20 1637   BP: 112/69   Patient Site: Left Arm   Patient Position: Sitting   Cuff Size: Adult Large   Pulse: 87   Resp: 16   Temp: 98  F (36.7  C)   TempSrc: Temporal   Weight: 170 lb 8 oz (77.3 kg)     Symmetric musculature of shoulder muscles and scapular muscles when viewed from behind.  No pain to palpation on AC joint coracoid process significant pain on palpation of anterior glenohumeral joint passive range of motion to 90  is normal and pain-free.  Jobes test negative    There is no pain on resisted internal rotation and intact strength.  There is no pain on resisted external rotation and intact strength  Empty can sign is negative    Diagnostics  None    Please note: Voice recognition software was used in this dictation.  It may therefore contain typographical errors.

## 2021-06-10 NOTE — LETTER
"    6/10/2021         RE: Deborah Swartz  [address]  [place] [address]        Dear Colleague,    Thank you for referring your patient, Deborah Swartz, to the [facility] [place]. Please see a copy of my visit note below.    Deborah Swartz  :  1995  DOS: 06/10/21  MRN: [id number]    Sports Medicine Clinic Visit    PCP: No primary care provider on file.    Deborah Swartz is a 25 year old Right hand dominant female who is seen in consultation at the request of  Aaron Ewing M.D. presenting with right shoulder injury.    Interim History - Sharon 10, 2021  Since last visit on 2021 patient has minimal right shoulder pain.  She reports mild discomfort over superior shoulder/AC joint with reaching overhead.  Patient is requesting updated work restrictions today - wanting to return most job tasks.  No interim injury.         Review of Systems  Musculoskeletal: as above  Remainder of review of systems is negative including constitutional, CV, pulmonary, GI, Skin and Neurologic except as noted in HPI or medical history.    Past Medical History:   Diagnosis Date     Papanicolaou smear of cervix with atypical squamous cells of undetermined significance (ASC-US) 16     No past surgical history on file.  Family History   Problem Relation Age of Onset     Hypertension Mother      GERD Mother      Congenital Anomalies Mother         Spinal Bifada     Anxiety Disorder Mother      Depression Father      Diabetes Paternal Grandmother        Objective  /75   Ht 1.6 m (5' 3\")   Wt 88.9 kg (196 lb)   BMI 34.72 kg/m        General: healthy, alert and in no distress      HEENT: no scleral icterus or conjunctival erythema     Skin: no suspicious lesions or rash. No jaundice.     CV: regular rhythm by palpation, 2+ distal pulses, no pedal edema      Resp: normal respiratory effort without conversational dyspnea     Psych: normal mood and affect      Gait: nonantalgic, " "appropriate coordination and balance     Neuro: normal light touch sensory exam of the extremities. Motor strength as noted below       Right Shoulder exam    ROM:        Full active and passive ROM with flexion, extension, abduction, internal and external rotation    Tender:        subacromial space right, resolved       Lateral deltoid improved       GH joint anteriorly resolved        Long head of biceps minimal       Significantly decreased  TTP of the right upper trapezius    Non Tender:       remainder of shoulder       acromioclavicular joint       periscapular region    Strength:        abduction 5-/5       internal rotation 5/5       external rotation 5/5       adduction 5/5    Impingement testing:       negative Neer       neg Gastelum       positive (+) empty can, minimal only       neg (-) crossover       neg (-) crank    Stability testing:       neg (-) anterior glide       neg (-) sulcus sign    Skin:       no visible deformities       well perfused       capillary refill brisk    Sensation:        normal sensation over shoulder and upper extremity     Radiology  XR SHOULDER RIGHT G/E 3 VIEWS 5/13/2021 1:48 PM      HISTORY: Work related injury; Pain in joint of right shoulder                                                               IMPRESSION: Negative exam.      Assessment:  1. Work related injury    2. Pain in joint of right shoulder    3. Subacromial bursitis of right shoulder joint    4. Biceps tendonitis on right        Plan:  Discussed the assessment with the patient.  Follow up: 6 weeks  Significantly improved overall  Letter updated for work:  \"Deborah is being followed in my clinic for her right shoulder injury that occurred at work on June 26, 2020.  She has been evaluated and treated with Tylenol,  physical therapy, steroid injection around the rotator cuff, as well as work restrictions.  She continues to have signs of improving rotator cuff tendinitis/bursitis. Her ongoing pain in her " "biceps tendon is still improved today after injection as well.  She will continue to work on this with physical therapy.  Based on her progress, the following should be her current restrictions: limited work above shoulder height with her right arm if possible, a lift/carry/push/pull restriction of 25 pounds with her right arm over shoulder height.  She can increase repetitive activity as tolerated with the right arm for up to 10 hours within the above restrictions, but should continue to modify, eliminate, or rotate activities as needed to avoid painful or unsafe activity.   If her pain increases again will plan for MRI to evaluate for any underlying structural issue.     These restrictions will be in place until 8/1/2021 and I will follow up with her in 6 weeks to check her progress.  Patient to continue physical therapy.  Updated recommendations will be provided at her next visit, sooner if needed based on her progress..\"  US guided biceps tendon sheath CSI helpful  TPI for muscle pain and spasm  Was helpful, minimal recurrence  Consider MRI of the shoulder if pain increases again  PT options and strategies reviewed  Advised continuing Voltaren gel for some pain/antiinflammatory benefit, safe to use topical NSAID  Supportive care reviewed  All questions were answered today  Contact us with additional questions or concerns  Signs and sx of concern reviewed      Garrett Dixon DO, CAQ  Primary Care Sports Medicine  [facility]           Disclaimer: This note consists of symbols derived from keyboarding, dictation and/or voice recognition software. As a result, there may be errors in the script that have gone undetected. Please consider this when interpreting information found in this chart.      Again, thank you for allowing me to participate in the care of your patient.        Sincerely,        Garrett Dixon DO    "

## 2021-06-10 NOTE — PROGRESS NOTES
Assessment/Plan:        1. Right shoulder pain, unspecified chronicity  Exam findings and treatment plan options were reviewed and patient is agreeable to physical therapy  Plan:  - Ambulatory referral to PT/OT    Close follow up if no significant change or improvement as anticipated.       At the conclusion of the encounter the plan of care, disposition and all questions were answered and reviewed, and the patient acknowledged understanding and was involved in the decision making regarding the overall care plan.     Patient Care Team:  Uli Yeung MD as PCP - General (Family Medicine)  Uli Yeung MD as Assigned PCP          Subjective:    Patient ID:   Deborah Swartz is a 25 y.o. female comes in follow-up of ongoing right shoulder pain.  She initially injured the attending to at work on 6/28/2020 when she was reaching out of herself lifting heavy boxes of [employer] where she works.  She was taken out off work for 2 weeks improved with doing physical therapy in addition to using Biofreeze and Tylenol, with an authorization to return to work without restrictions.  However she reinjured her shoulder in an attempt to lift greater than 25 pounds.  Unfortunately light duty option is not available to her at work.  At this point she remains off duty until further notice          Review of Systems  Allergy: reviewed  General : negative  A complete 5 point review of systems was obtained and is negative other than what is stated in the HPI.       The following patient's history were reviewed and updated as appropriate:   She  has no past medical history on file..      Outpatient Encounter Medications as of 7/27/2020   Medication Sig Dispense Refill     prenatal vit no.130-iron-folic (PRENATAL VITAMIN) 27 mg iron- 800 mcg Tab tablet Take 1 tablet by mouth daily. 90 tablet 3     sertraline (ZOLOFT) 25 MG tablet Take 1 tablet (25 mg total) by mouth daily. 30 tablet 0     No facility-administered encounter  medications on file as of 7/27/2020.          Objective:   /62   Pulse 74   Wt 171 lb (77.6 kg)   LMP 01/29/2020 (Exact Date)   BMI 30.29 kg/m        Physical Exam  General: No apparent distress and well hydrated  Skin: No rash  Right shoulder: Normal to inspection, excessive sharp pain on range of motion

## 2021-06-11 NOTE — PROGRESS NOTES
"Deborah Swartz  :  1995  DOS: 06/10/21  MRN: [id number]    Sports Medicine Clinic Visit    PCP: No primary care provider on file.    Deborah Swartz is a 25 year old Right hand dominant female who is seen in consultation at the request of  Aaron Ewing M.D. presenting with right shoulder injury.    Interim History - Sharon 10, 2021  Since last visit on 2021 patient has minimal right shoulder pain.  She reports mild discomfort over superior shoulder/AC joint with reaching overhead.  Patient is requesting updated work restrictions today - wanting to return most job tasks.  No interim injury.         Review of Systems  Musculoskeletal: as above  Remainder of review of systems is negative including constitutional, CV, pulmonary, GI, Skin and Neurologic except as noted in HPI or medical history.    Past Medical History:   Diagnosis Date     Papanicolaou smear of cervix with atypical squamous cells of undetermined significance (ASC-US) 16     No past surgical history on file.  Family History   Problem Relation Age of Onset     Hypertension Mother      GERD Mother      Congenital Anomalies Mother         Spinal Bifada     Anxiety Disorder Mother      Depression Father      Diabetes Paternal Grandmother        Objective  /75   Ht 1.6 m (5' 3\")   Wt 88.9 kg (196 lb)   BMI 34.72 kg/m        General: healthy, alert and in no distress      HEENT: no scleral icterus or conjunctival erythema     Skin: no suspicious lesions or rash. No jaundice.     CV: regular rhythm by palpation, 2+ distal pulses, no pedal edema      Resp: normal respiratory effort without conversational dyspnea     Psych: normal mood and affect      Gait: nonantalgic, appropriate coordination and balance     Neuro: normal light touch sensory exam of the extremities. Motor strength as noted below       Right Shoulder exam    ROM:        Full active and passive ROM with flexion, extension, abduction, internal and external " "rotation    Tender:        subacromial space right, resolved       Lateral deltoid improved       GH joint anteriorly resolved        Long head of biceps minimal       Significantly decreased  TTP of the right upper trapezius    Non Tender:       remainder of shoulder       acromioclavicular joint       periscapular region    Strength:        abduction 5-/5       internal rotation 5/5       external rotation 5/5       adduction 5/5    Impingement testing:       negative Neer       neg Gastelum       positive (+) empty can, minimal only       neg (-) crossover       neg (-) crank    Stability testing:       neg (-) anterior glide       neg (-) sulcus sign    Skin:       no visible deformities       well perfused       capillary refill brisk    Sensation:        normal sensation over shoulder and upper extremity     Radiology  XR SHOULDER RIGHT G/E 3 VIEWS 5/13/2021 1:48 PM      HISTORY: Work related injury; Pain in joint of right shoulder                                                               IMPRESSION: Negative exam.      Assessment:  1. Work related injury    2. Pain in joint of right shoulder    3. Subacromial bursitis of right shoulder joint    4. Biceps tendonitis on right        Plan:  Discussed the assessment with the patient.  Follow up: 6 weeks  Significantly improved overall  Letter updated for work:  \"Deborah is being followed in my clinic for her right shoulder injury that occurred at work on June 26, 2020.  She has been evaluated and treated with Tylenol,  physical therapy, steroid injection around the rotator cuff, as well as work restrictions.  She continues to have signs of improving rotator cuff tendinitis/bursitis. Her ongoing pain in her biceps tendon is still improved today after injection as well.  She will continue to work on this with physical therapy.  Based on her progress, the following should be her current restrictions: limited work above shoulder height with her right arm if " "possible, a lift/carry/push/pull restriction of 25 pounds with her right arm over shoulder height.  She can increase repetitive activity as tolerated with the right arm for up to 10 hours within the above restrictions, but should continue to modify, eliminate, or rotate activities as needed to avoid painful or unsafe activity.   If her pain increases again will plan for MRI to evaluate for any underlying structural issue.     These restrictions will be in place until 8/1/2021 and I will follow up with her in 6 weeks to check her progress.  Patient to continue physical therapy.  Updated recommendations will be provided at her next visit, sooner if needed based on her progress..\"  US guided biceps tendon sheath CSI helpful  TPI for muscle pain and spasm  Was helpful, minimal recurrence  Consider MRI of the shoulder if pain increases again  PT options and strategies reviewed  Advised continuing Voltaren gel for some pain/antiinflammatory benefit, safe to use topical NSAID  Supportive care reviewed  All questions were answered today  Contact us with additional questions or concerns  Signs and sx of concern reviewed      Garrett Dixon DO, [place]  Primary Care Sports Medicine  [facility]           Disclaimer: This note consists of symbols derived from keyboarding, dictation and/or voice recognition software. As a result, there may be errors in the script that have gone undetected. Please consider this when interpreting information found in this chart.  "

## 2021-06-11 NOTE — PROGRESS NOTES
Impression:  Right shoulder injury probably impingement syndrome    Plan:  Ice, Tylenol, continue physical therapy, avoid lifting for 7 days, follow-up with orthopedic surgery for consideration of steroid injection      Chief Complaint:  Chief Complaint   Patient presents with     Shoulder Pain     since  (had injury), has gotten worse last 4 days, Rt shoulder         HPI:   Deborah Swartz is a 25 y.o. female who presents to this clinic for the evaluation of pain in the right shoulder.  The patient injured her right shoulder back in  when she was lifting a heavy object while reaching out.  She was treated with physical therapy and was improving but was lifting her body up by extending her shoulder behind her 4 days ago when she felt a pop and had pain on the top of the right shoulder.  Since then she has felt a popping sensation in the right shoulder when she moves it and has pain.  The pain is over the top of the shoulder.  There is no numbness or weakness.  No neck pain.  No other painful areas.  No redness or swelling.  She is 16 weeks pregnant.  Has taken Tylenol with partial but incomplete relief      PMH:   No past medical history on file.  Past Surgical History:   Procedure Laterality Date      SECTION           ROS:  All other systems negative    Meds:    Current Outpatient Medications:      cholecalciferol, vitamin D3, 1,250 mcg (50,000 unit) capsule, , Disp: , Rfl:      prenatal vit no.130-iron-folic (PRENATAL VITAMIN) 27 mg iron- 800 mcg Tab tablet, Take 1 tablet by mouth daily., Disp: 90 tablet, Rfl: 3     sertraline (ZOLOFT) 25 MG tablet, Take 1 tablet (25 mg total) by mouth daily., Disp: 30 tablet, Rfl: 0        Social:  Social History     Socioeconomic History     Marital status: Patient Declined     Spouse name: Not on file     Number of children: Not on file     Years of education: Not on file     Highest education level: Not on file   Occupational History     Not on file   Social  Needs     Financial resource strain: Not on file     Food insecurity     Worry: Not on file     Inability: Not on file     Transportation needs     Medical: Not on file     Non-medical: Not on file   Tobacco Use     Smoking status: Former Smoker     Types: Cigarettes     Smokeless tobacco: Never Used   Substance and Sexual Activity     Alcohol use: Yes     Drug use: Never     Sexual activity: Not on file   Lifestyle     Physical activity     Days per week: Not on file     Minutes per session: Not on file     Stress: Not on file   Relationships     Social connections     Talks on phone: Not on file     Gets together: Not on file     Attends Taoist service: Not on file     Active member of club or organization: Not on file     Attends meetings of clubs or organizations: Not on file     Relationship status: Not on file     Intimate partner violence     Fear of current or ex partner: Not on file     Emotionally abused: Not on file     Physically abused: Not on file     Forced sexual activity: Not on file   Other Topics Concern     Not on file   Social History Narrative     Not on file         Physical Exam:  Vitals:    09/11/20 0744   BP: 100/61   Pulse: 76   Resp: 12   Temp: 97.9  F (36.6  C)   TempSrc: Oral   SpO2: 99%      Vital signs reviewed  Eyes: PERRL, EOMI  Head: Atraumatic and normocephalic  Extremities: Right shoulder shows normal range of motion, she has pain at the extreme of abduction and internal rotation.  There are no areas of tenderness.  There is no erythema or warmth.  Distal sensation and motor function and pulses are normal.  There is no tenderness of the neck or the rest of the arm  Skin: No lesions or rash  Neuro: Normal motor and sensory function in all extremities  Psych: Awake, alert, normally responsive      Results:    No results found for this or any previous visit (from the past 24 hour(s)).    No results found.      Aaron Ewing MD

## 2021-06-12 NOTE — PROGRESS NOTES
Assessment/Plan:        1. Right shoulder pain, unspecified chronicity  Work- comp     See work note :   She is recommended a 6 weeks course of Physical Therapy.    Her work absence is extended to 11/9/20, until which she will follow up in office for reassessment.         Subjective:    Patient ID:   Deborah Swartz is a 25 y.o. female here for work-comp follow up on right shoulder pain. She needs her work papers addressed. She is still working with the Physical Therapy and not fully prepared to return to work to carrying on her duties.       Review of Systems  Allergy: reviewed  General : negative  A complete 5 point review of systems was obtained and is negative other than what is stated in the HPI.        The following patient's history were reviewed and updated as appropriate:   She  has no past medical history on file..      Outpatient Encounter Medications as of 10/14/2020   Medication Sig Dispense Refill     prenatal vit no.130-iron-folic (PRENATAL VITAMIN) 27 mg iron- 800 mcg Tab tablet Take 1 tablet by mouth daily. 90 tablet 3     sertraline (ZOLOFT) 25 MG tablet Take 1 tablet (25 mg total) by mouth daily. 30 tablet 0     No facility-administered encounter medications on file as of 10/14/2020.          Objective:   /58 (Patient Site: Right Arm, Patient Position: Sitting, Cuff Size: Adult Regular)   Pulse 89   Temp 97.9  F (36.6  C) (Oral)   Wt 169 lb (76.7 kg)   LMP 01/29/2020 (Approximate)   SpO2 98%   BMI 29.94 kg/m

## 2021-06-12 NOTE — PROGRESS NOTES
[facility] Daily Progress Note    Patient Name: Deborah Swartz  Date: 10/22/2020  Visit #: 3  PTA visit #:  NA  Referral Diagnosis: Right shoulder injury, subsequent encounter  Referring provider: Uli Yeung, *  Visit Diagnosis:     ICD-10-CM    1. Right shoulder pain, unspecified chronicity  M25.511      Assessment from Initial Evaluation:  Deborah Swartz is a 25 y.o. female who presents to therapy today with chief complaints of (R) shoulder and arm pain. Onset date of sx was 6/28/20, work related.  Functional impairments include reaching in all directions, lifting, working, sleeping, personal cares, grooming, lying on (R) side.  Clinical findings include decreased shoulder A/PROM, mild pain with resisted testing, mild pain with provocation tests, tenderness of sup shoulder, ant GH joint and ant humeral head.     Precautions / Restrictions : 12 weeks pregnant    Assessment:     HEP/POC compliance is  good .  The patient believes that her shoulder is getting stronger, although it feels like the pain is returning.  She has been educated on icing her shoulder for pain and inflammation control as she is currently pregnant.  She was able to tolerate new exercises and treatment well today.  She is appropriate to continue with skilled PT services at this time.    Goal Status:  Pt. will demonstrate/verbalize independence in self-management of condition in : 6 weeks  Pt. will be independent with home exercise program in : 6 weeks  Pt. will have improved quality of sleep: getting 75-90% of required amount;in 6 weeks  Patient will work: with restrictions;in 6 weeks  Patient will perform repetitive movement in : arm;for work;with no pain;in 12 weeks    Pt will: be able to reach above shoulder height, overhead and behind back without increased pain in 8 weeks.    Plan / Patient Education:     Continue with initial plan of care.  Progress with home program as tolerated.  Progress scapular and RC  strengthening as well as general shoulder strengthening.    Subjective:     Pain Rating: 3  The patient reports that her shoulder feels like it's getting stronger, but the pain is coming back.  She is taking Tylenol for pain management.  The shoulder still pops at times.  The outside of her shoulder is hurting now rather than the front or the top.    Objective:     Shoulder ROM appears WNL.  Twinge with shoulder IR in the clavicle.    Exercise #1: scap retraction  Comment #1: HEP  Exercise #2: pec stretch over towel roll  Comment #2: HEP  Exercise #3: rows  Comment #3: L2 band HEP  Exercise #4: Shoulder isometrics  Comment #4: IR HEP; flexion and extension x 5 each  Exercise #5: Shoulder ER with band  Comment #5: L2 band HEP    Appt time: 8:50AM - 9:13AM    Treatment Today     TREATMENT MINUTES COMMENTS   Evaluation     Self-care/ Home management     Manual therapy 8 -Supine GH joint mobilizations grade I for inflammation control AP and gentle distraction   Neuromuscular Re-education     Therapeutic Activity     Therapeutic Exercises 15 -Subjective measures taken  -See flow sheet; added shoulder flexion and extension isometrics to HEP and patient to progress to L2 band for shoulder ER strengthening   Gait training     Modality__________________                Total 23    Blank areas are intentional and mean the treatment did not include these items.       Alley Santiago, PT, DPT  10/22/2020

## 2021-06-12 NOTE — PROGRESS NOTES
Assessment/Plan:        1. 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   Continuing to do Physical Therapy, has missed 2 sessions.    Last PHYSICAL THERAPY appointment on 8/13/20     Plan:   Continue Physical Therapy  Would appreciate a follow up note/ communication from Physical Therapy with their plan of care to assist us in writing a work letter.           Subjective:    Patient ID:   Deborah Swartz is a 25 y.o. female comes in follow up of work related right shoulder pain.  She is contemplating whether to return to work or not.  She notes that her shoulder is overall improving with less pain, but does not feel quite comfortable returning to the work which involves lifting boxes.  She notes to having a clicking sensation in her shoulder yet.    She states that overall plan of care is not clear to her, as she does not know how many more sessions has left to compete Physical Therapy.        Review of Systems  Allergy: reviewed  General : negative  A complete 5 point review of systems was obtained and is negative other than what is stated in the HPI.      The following patient's history were reviewed and updated as appropriate:   She  has no past medical history on file..      Outpatient Encounter Medications as of 10/9/2020   Medication Sig Dispense Refill     prenatal vit no.130-iron-folic (PRENATAL VITAMIN) 27 mg iron- 800 mcg Tab tablet Take 1 tablet by mouth daily. 90 tablet 3     cholecalciferol, vitamin D3, 1,250 mcg (50,000 unit) capsule        sertraline (ZOLOFT) 25 MG tablet Take 1 tablet (25 mg total) by mouth daily. 30 tablet 0     No facility-administered encounter medications on file as of 10/9/2020.          Objective:   /60 (Patient Site: Right Arm, Patient Position: Sitting, Cuff Size: Adult Regular)   Pulse 79   Temp (!) 96.5  F (35.8  C) (Tympanic)    "Ht 5' 3\" (1.6 m)   Wt 171 lb (77.6 kg)   LMP 01/29/2020 (Approximate)   SpO2 97%   BMI 30.29 kg/m          "

## 2021-06-12 NOTE — PROGRESS NOTES
[facility] Daily Progress Note    Patient Name: Deborah Swartz  Date: 10/12/2020  Visit #: 2  PTA visit #:  NA  Referral Diagnosis: Right shoulder injury, subsequent encounter  Referring provider: Uli Yeung, *  Visit Diagnosis:     ICD-10-CM    1. Right shoulder pain, unspecified chronicity  M25.511      Assessment from Initial Evaluation:  Deborah Swartz is a 25 y.o. female who presents to therapy today with chief complaints of (R) shoulder and arm pain. Onset date of sx was 6/28/20, work related.  Functional impairments include reaching in all directions, lifting, working, sleeping, personal cares, grooming, lying on (R) side.  Clinical findings include decreased shoulder A/PROM, mild pain with resisted testing, mild pain with provocation tests, tenderness of sup shoulder, ant GH joint and ant humeral head.     Precautions / Restrictions : 12 weeks pregnant    Assessment:     HEP/POC compliance is  fair .  The patient presents for her first follow up visit 2 months after her initial evaluation.  She had a cortisone injection a month ago and her shoulder ROM has returned to normal.  She continues to have pain as well as some popping in her shoulder.  She is appropriate to continue with skilled PT services 1x/week for strengthening as her ROM is now WNL.    Goal Status:  Pt. will demonstrate/verbalize independence in self-management of condition in : 6 weeks  Pt. will be independent with home exercise program in : 6 weeks  Pt. will have improved quality of sleep: getting 75-90% of required amount;in 6 weeks  Patient will work: with restrictions;in 6 weeks  Patient will perform repetitive movement in : arm;for work;with no pain;in 12 weeks    Pt will: be able to reach above shoulder height, overhead and behind back without increased pain in 8 weeks.      Plan / Patient Education:     Continue with initial plan of care.  Progress with home program as tolerated.  Progress scapular and RC  "strengthening as well as general shoulder strengthening.    Subjective:     Pain Ratin  The patient reports that her pain got worse before it got better.  She got a cortisone injection about a month ago.  The pain has improved, but when it pops, her pain is about a 2-3/10.  Movement is still painful.  She can pick things up without pain, but the popping will cause pain to increase.  She feels like she is getting some strength.      Objective:     Shoulder ROM appears WNL and is pain free.    Exercise #1: scap retraction  Comment #1: x 5  Exercise #2: pec stretch over towel roll  Comment #2: HEP  Exercise #3: rows  Comment #3: 5\" hold x 10 with L1 band in clinic; L2 band at home; corrected technique and positioning  Exercise #4: ER/IR isometrics in doorway  Comment #4: IR HEP  Exercise #5: Shoulder ER with band  Comment #5: L1 x 15    Appt time: 2:24PM - 2:50PM    Treatment Today     TREATMENT MINUTES COMMENTS   Evaluation     Self-care/ Home management     Manual therapy 8 -Supine GH joint mobilizations grade I for inflammation control AP and gentle distraction   Neuromuscular Re-education     Therapeutic Activity     Therapeutic Exercises 18 -Subjective measures taken  -See flow sheet; progressed shoulder ER to resistance band   Gait training     Modality__________________                Total 26    Blank areas are intentional and mean the treatment did not include these items.       Alley Santiago, PT, DPT  10/12/2020      "

## 2021-06-12 NOTE — PROGRESS NOTES
[facility] Daily Progress Note    Patient Name: Deborah Swartz  Date: 10/29/2020  Visit #: 4  PTA visit #:  NA  Referral Diagnosis: Right shoulder injury, subsequent encounter  Referring provider: Uli Yeung, *  Visit Diagnosis:     ICD-10-CM    1. Right shoulder pain, unspecified chronicity  M25.511      Assessment from Initial Evaluation:  Deborah Swartz is a 25 y.o. female who presents to therapy today with chief complaints of (R) shoulder and arm pain. Onset date of sx was 6/28/20, work related.  Functional impairments include reaching in all directions, lifting, working, sleeping, personal cares, grooming, lying on (R) side.  Clinical findings include decreased shoulder A/PROM, mild pain with resisted testing, mild pain with provocation tests, tenderness of sup shoulder, ant GH joint and ant humeral head.     Precautions / Restrictions : 12 weeks pregnant    Assessment:     HEP/POC compliance is  good .  The patient reported worsening symptoms since last session.  Focus today was on bicep and supraspinatus mm tendons and doing XFM to them.  She was able to tolerate this well in addition to the updated exercises.  She is progressing in strength and is appropriate to continue with skilled PT services at this time.    Goal Status:  Pt. will demonstrate/verbalize independence in self-management of condition in : 6 weeks  Pt. will be independent with home exercise program in : 6 weeks  Pt. will have improved quality of sleep: getting 75-90% of required amount;in 6 weeks  Patient will work: with restrictions;in 6 weeks  Patient will perform repetitive movement in : arm;for work;with no pain;in 12 weeks    Pt will: be able to reach above shoulder height, overhead and behind back without increased pain in 8 weeks.    Plan / Patient Education:     Continue with initial plan of care.  Progress with home program as tolerated.  Progress scapular and RC strengthening as well as general  shoulder strengthening.    Subjective:     Pain Ratin  The patient reports that the pain is coming back.  She does not feel like the exercises are causing any pain.  Her shoulder feels better for about a day or two after PT and then it worsens from there.    Objective:     Shoulder ROM appears WNL.  Cervical ROM appears WNL.  Tenderness R bicep tendon and supraspinatus tendon.    Exercise #1: scap retraction  Comment #1: HEP  Exercise #2: pec stretch over towel roll  Comment #2: HEP  Exercise #3: rows  Comment #3: L2 band HEP  Exercise #4: Shoulder isometrics  Comment #4: Flexion and extension HEP  Exercise #5: Shoulder band exercises  Comment #5: L2 band ER, L1 band IR x 10  Exercise #6: Bicep curls  Comment #6: Palm up and thumb up x 10 L1 band    Appt time: 2:45PM - 3:12PM    Treatment Today     TREATMENT MINUTES COMMENTS   Evaluation     Self-care/ Home management     Manual therapy 12 -Supine XFM to R bicep tendon x 2 minutes followed by 1 minute passive stretch x 2 sets  -Supine supraspinatus XFM x 2 minutes x 2 sets  -Supine GH joint distraction oscillations   Neuromuscular Re-education     Therapeutic Activity     Therapeutic Exercises 15 -Subjective measures taken  -See flow sheet; progressed shoulder IR strengthening to L2 band and added bicep curls to HEP for strengthening   Gait training     Modality__________________                Total 27    Blank areas are intentional and mean the treatment did not include these items.       Alley Santiago, PT, DPT  10/29/2020

## 2021-06-13 NOTE — PROGRESS NOTES
[facility] Daily Progress     Patient Name: Deborah Swartz  Date: 12/2/2020  Visit #: 7  PTA visit #:  NA  Referral Diagnosis: Right shoulder injury, subsequent encounter  Referring provider: Uli Yeung, *  Visit Diagnosis:     ICD-10-CM    1. Right shoulder pain, unspecified chronicity  M25.511      Assessment from Initial Evaluation:  Deborah Swartz is a 25 y.o. female who presents to therapy today with chief complaints of (R) shoulder and arm pain. Onset date of sx was 6/28/20, work related.  Functional impairments include reaching in all directions, lifting, working, sleeping, personal cares, grooming, lying on (R) side.  Clinical findings include decreased shoulder A/PROM, mild pain with resisted testing, mild pain with provocation tests, tenderness of sup shoulder, ant GH joint and ant humeral head.      Precautions / Restrictions : 12 weeks pregnant    Assessment:     HEP/POC compliance is  good .  The patient demonstrates improvements in her strength and has been able to tolerate more exercise.  Her ROM remains WFL but is having less pain with flexion than she was before.  She continues to have discomfort with shoulder IR and pain with shoulder abduction on the R.  She had vaccinations in her arms bilaterally today, so she was more sore than normal.  She is progressing and appropriate to continue with skilled PT services at this time.    Goal Status:  Pt. will demonstrate/verbalize independence in self-management of condition in : 6 weeks - progressing   Pt. will be independent with home exercise program in : 6 weeks   Met: doing them 1-2x/day  Pt. will have improved quality of sleep: getting 75-90% of required amount;in 6 weeks  - Progressing, sleeping better at night  Patient will work: with restrictions;in 6 weeks - not  Met, has not returned to work  Patient will perform repetitive movement in : arm;for work;with no pain;in 12 weeks - patient feels she could do 3 hours at this  time     Pt will: be able to reach above shoulder height, overhead and behind back without increased pain in 8 weeks.- progressing, still has pain with certain movements    Plan / Patient Education:     Continue with initial plan of care.  Progress with home program as tolerated.  Progress scapular and RC strengthening as well as general shoulder strengthening.    Subjective:     Pain Ratin-6  The patient reports that her shoulder is getting stronger, but the pain is also getting stronger as well.  She sent information to work stating she can't go back to work on Friday.  It is getting more comfortable to take her arm through its full ROM.    Objective:     Shoulder ROM is WNL.  Tenderness over supraspinatus tendon and bicep tendon R.  Pain on top of R shoulder with shoulder abduction.  Tender 1st rib R with hypomobility.    Exercise #1: scap retraction  Comment #1: HEP  Exercise #2: pec stretch over towel roll  Comment #2: HEP  Exercise #3: rows  Comment #3: L3 band HEP  Exercise #4: Shoulder isometrics  Comment #4: Extension HEP  Exercise #5: Shoulder band exercises  Comment #5: L2 band ER, L1 band IR HEP  Exercise #6: Bicep curls  Comment #6: Palm up and thumb up L3 HEP  Exercise #7: Shoulder flexion strengthening  Comment #7: 1# weight x 10; patient to return to isometric if exercise is painful    Appt time: 3:52PM - 4:20PM    Treatment Today     TREATMENT MINUTES COMMENTS   Evaluation     Self-care/ Home management     Manual therapy 13 -Supine XFM to R bicep tendon x 2 minutes followed by 1 minute passive stretch x 2 sets  -Supine supraspinatus XFM x 2 minutes x 2 sets  -Supine grade III joint mobilizations to R 1st rib x 30 x 2   Neuromuscular Re-education     Therapeutic Activity     Therapeutic Exercises 15 -Subjective measures taken  -See flow sheet; progressed shoulder flexion strengthening to standing with 1# weight as patient is able to lift to shoulder height without pain and was ready to progress    Gait training     Modality__________________                Total 28    Blank areas are intentional and mean the treatment did not include these items.       Alley Santiago, PT, DPT  12/2/2020

## 2021-06-13 NOTE — TELEPHONE ENCOUNTER
[facility] on site called and spoke with patient regarding NS visit today.  She is currently making baby food and is not able to make her appointment.  Alley Santiago, PT, DPT

## 2021-06-13 NOTE — PROGRESS NOTES
[facility] Daily Progress     Patient Name: Deborah Swartz  Date: 11/12/2020  Visit #: 5  PTA visit #:  NA  Referral Diagnosis: Right shoulder injury, subsequent encounter  Referring provider: Uli Yeung, *  Visit Diagnosis:     ICD-10-CM    1. Right shoulder pain, unspecified chronicity  M25.511      Assessment from Initial Evaluation:  Deborah Swartz is a 25 y.o. female who presents to therapy today with chief complaints of (R) shoulder and arm pain. Onset date of sx was 6/28/20, work related.  Functional impairments include reaching in all directions, lifting, working, sleeping, personal cares, grooming, lying on (R) side.  Clinical findings include decreased shoulder A/PROM, mild pain with resisted testing, mild pain with provocation tests, tenderness of sup shoulder, ant GH joint and ant humeral head.      Precautions / Restrictions : 12 weeks pregnant    Assessment:     Patient demonstrates understanding/independence with home program.  Patient is appropriate to continue with skilled physical therapy intervention, as indicated by initial plan of care.  She is progressing in strength and is appropriate to continue with skilled PT services at this time.    Goal Status:  Pt. will demonstrate/verbalize independence in self-management of condition in : 6 weeks - progressing   Pt. will be independent with home exercise program in : 6 weeks   Met: doing them 1-2x/day  Pt. will have improved quality of sleep: getting 75-90% of required amount;in 6 weeks  - Progressing, no sleep disruption last few nights  Patient will work: with restrictions;in 6 weeks - not  Met, has not returned to work  Patient will perform repetitive movement in : arm;for work;with no pain;in 12 weeks - not met.      Pt will: be able to reach above shoulder height, overhead and behind back without increased pain in 8 weeks.- progressing, still has pain with certain movements      Plan / Patient Education:     Continue with  initial plan of care.  Progress with home program as tolerated.  Progress scapular and RC strengthening as well as general shoulder strengthening.    Subjective:     Pain Ratin  Strength is improving. Sx are intermittent, have improved since the last session. Pain remains better since cortisone injection several months ago.  She has a 5# lifting restriction, so she has not returned to work. Pain with reaching behind to don bra, lifting above shoulder height, overhead reaching.     Objective:     Mod tenderness of (R) ant humeral head, greater tuberosity, biceps tendon.     Shoulder/Elbow ROM  Date:      Shoulder and Elbow ROM ( )   AROM in degrees AROM in degrees AROM in degrees    Right Left Right Left Right Left   Shoulder Flexion (0-180 ) WNL        Shoulder Abduction (0-180 ) WNL        Shoulder Extension (0-60 )         Shoulder ER (0-90 )         Shoulder IR (0-70 )         Shoulder IR/EXT WNL        Elbow Flexion (150 )         Elbow Extension (0 )          PROM in degrees PROM in degrees PROM in degrees    Right Left Right Left Right Left   Shoulder Flexion (0-180 )         Shoulder Abduction (0-180 )         Shoulder Extension (0-60 )         Shoulder ER (0-90 )         Shoulder IR (0-70 )         Elbow Flexion (150 )         Elbow Extension (0 )               Treatment Today     TREATMENT MINUTES COMMENTS   Evaluation     Self-care/ Home management     Manual therapy 10 Induction, indirect, direct techniques utilized as appropriate for optimal tissue release.   MFR/SCS - (R) SBA-LV,  (R) HUMAC(P), (R) long head of biceps, TFM/MFR to biceps tendon   Neuromuscular Re-education     Therapeutic Activity     Therapeutic Exercises 15 Reassessed goals, functional status, objective measures,   Exercise #3: rows  Comment #3: 10 x green   Exercise #6: Bicep curls  Comment #6: Palm up and thumb up x 10 L1 band     Gait training     Modality__________________                Total 25    Blank areas are intentional  and mean the treatment did not include these items.       Janae Doe  11/12/2020

## 2021-06-13 NOTE — PROGRESS NOTES
[facility] Daily Progress     Patient Name: Deborah Swartz  Date: 11/16/2020  Visit #: 6  PTA visit #:  NA  Referral Diagnosis: Right shoulder injury, subsequent encounter  Referring provider: Uli Yeung, *  Visit Diagnosis:     ICD-10-CM    1. Right shoulder pain, unspecified chronicity  M25.511      Assessment from Initial Evaluation:  Deborah Swartz is a 25 y.o. female who presents to therapy today with chief complaints of (R) shoulder and arm pain. Onset date of sx was 6/28/20, work related.  Functional impairments include reaching in all directions, lifting, working, sleeping, personal cares, grooming, lying on (R) side.  Clinical findings include decreased shoulder A/PROM, mild pain with resisted testing, mild pain with provocation tests, tenderness of sup shoulder, ant GH joint and ant humeral head.      Precautions / Restrictions : 12 weeks pregnant    Assessment:     HEP/POC compliance is  good .  The patient has been compliant with her HEP and has noticed improvements in her strength as well as her pain level and function.  She is able to lift more with less pain.  She was able to tolerate treatment well today and is appropriate to continue with skilled PT services at this time.    Goal Status:  Pt. will demonstrate/verbalize independence in self-management of condition in : 6 weeks - progressing   Pt. will be independent with home exercise program in : 6 weeks   Met: doing them 1-2x/day  Pt. will have improved quality of sleep: getting 75-90% of required amount;in 6 weeks  - Progressing, no sleep disruption last few nights  Patient will work: with restrictions;in 6 weeks - not  Met, has not returned to work  Patient will perform repetitive movement in : arm;for work;with no pain;in 12 weeks - not met.      Pt will: be able to reach above shoulder height, overhead and behind back without increased pain in 8 weeks.- progressing, still has pain with certain movements    Plan / Patient  Education:     Continue with initial plan of care.  Progress with home program as tolerated.  Progress scapular and RC strengthening as well as general shoulder strengthening.    Subjective:     Pain Ratin  The patient reports that her shoulder is getting better.  No big changes since last session.  She can now lift 10# for her lifting restriction.      Objective:     Shoulder ROM is WNL.  Tenderness over supraspinatus tendon and bicep tendon R.    Exercise #1: scap retraction  Comment #1: HEP  Exercise #2: pec stretch over towel roll  Comment #2: HEP  Exercise #3: rows  Comment #3: L3 band HEP  Exercise #4: Shoulder isometrics  Comment #4: Flexion and extension HEP  Exercise #5: Shoulder band exercises  Comment #5: L2 band ER, L1 band IR HEP  Exercise #6: Bicep curls  Comment #6: Palm up and thumb up L3 HEP    Appt time: 10:56AM - 11:17AM    Treatment Today     TREATMENT MINUTES COMMENTS   Evaluation     Self-care/ Home management     Manual therapy 11 -Supine XFM to R bicep tendon x 2 minutes followed by 1 minute passive stretch x 2 sets  -Supine supraspinatus XFM x 2 minutes x 2 sets   Neuromuscular Re-education     Therapeutic Activity     Therapeutic Exercises 10 -Subjective measures taken  -Discussed HEP and patient to continue with L3 band for home exercises   Gait training     Modality__________________                Total 21    Blank areas are intentional and mean the treatment did not include these items.       Alley Santiago, PT, DPT  2020

## 2021-06-13 NOTE — PROGRESS NOTES
[facility] Daily Progress     Patient Name: Deborah Swartz  Date: 12/10/2020  Visit #: 8  PTA visit #:  NA  Referral Diagnosis: Right shoulder injury, subsequent encounter  Referring provider: Uli Yeung, *  Visit Diagnosis:     ICD-10-CM    1. Right shoulder pain, unspecified chronicity  M25.511      Assessment from Initial Evaluation:  Deborah Swartz is a 25 y.o. female who presents to therapy today with chief complaints of (R) shoulder and arm pain. Onset date of sx was 6/28/20, work related.  Functional impairments include reaching in all directions, lifting, working, sleeping, personal cares, grooming, lying on (R) side.  Clinical findings include decreased shoulder A/PROM, mild pain with resisted testing, mild pain with provocation tests, tenderness of sup shoulder, ant GH joint and ant humeral head.      Precautions / Restrictions : 12 weeks pregnant    Assessment:     HEP/POC compliance is  good .  The patient demonstrates improvements in her general strength, but continues to have pain in her shoulder.  She tolerated treatment well and has noticed recovery taking much less time than before.  She is appropriate to continue with skilled PT services at this time.    Goal Status:  Pt. will demonstrate/verbalize independence in self-management of condition in : 6 weeks - progressing    Pt. will be independent with home exercise program in : 6 weeks   Met: doing them 1-2x/day  Pt. will have improved quality of sleep: getting 75-90% of required amount;in 6 weeks  - Progressing, sleeping better at night  Patient will work: with restrictions;in 6 weeks - not  Met, has not returned to work  Patient will perform repetitive movement in : arm;for work;with no pain;in 12 weeks - patient feels she could do 3 hours at this time    Pt will: be able to reach above shoulder height, overhead and behind back without increased pain in 8 weeks.- progressing, still has pain with certain movements    Plan /  Patient Education:     Continue with initial plan of care.  Progress with home program as tolerated.  Progress scapular and RC strengthening as well as general shoulder strengthening.    Subjective:     Pain Ratin  The patient reports that her arm is feeling about the same.  It continues to get stronger, but the pain is still there.  She has been avoiding pushing herself up from bed.    Objective:     Shoulder ROM is WNL.  Tenderness over supraspinatus tendon and bicep tendon R.    Exercise #1: scap retraction  Comment #1: HEP  Exercise #2: pec stretch over towel roll  Comment #2: HEP  Exercise #3: rows  Comment #3: L3 band HEP  Exercise #4: Shoulder isometrics  Comment #4: Extension HEP  Exercise #5: Shoulder band exercises  Comment #5: L2 band ER, L1 band IR HEP  Exercise #6: Bicep curls  Comment #6: Palm up and thumb up L3 HEP; palm down x 10 L3  Exercise #7: Shoulder flexion strengthening  Comment #7: 1# weight HEP    Appt time: 3:46PM - 4:11PM    Treatment Today     TREATMENT MINUTES COMMENTS   Evaluation     Self-care/ Home management     Manual therapy 11 -Supine XFM to R bicep tendon x 2 minutes followed by 1 minute passive stretch x 2 sets  -Supine supraspinatus XFM x 2 minutes x 2 sets   Neuromuscular Re-education     Therapeutic Activity     Therapeutic Exercises 13 -Subjective measures taken  -See flow sheet; added palm down bicep curls for brachialis strengthening  -Discussed progressing resistance for bands and weights for exercises   Gait training     Modality__________________                Total 24    Blank areas are intentional and mean the treatment did not include these items.       Alley Santiago, PT, DPT  12/10/2020

## 2021-06-14 NOTE — PROGRESS NOTES
[facility] Daily Progress     Patient Name: Deborah Swartz  Date: 1/26/2021  Visit #: 12  PTA visit #:  NA  Referral Diagnosis: Right shoulder injury, subsequent encounter  Referring provider: Uli Yeung, *  Visit Diagnosis:     ICD-10-CM    1. Right shoulder pain, unspecified chronicity  M25.511      Assessment from Initial Evaluation:  Deborah Swartz is a 25 y.o. female who presents to therapy today with chief complaints of (R) shoulder and arm pain. Onset date of sx was 6/28/20, work related.  Functional impairments include reaching in all directions, lifting, working, sleeping, personal cares, grooming, lying on (R) side.  Clinical findings include decreased shoulder A/PROM, mild pain w,ith resisted testing, mild pain with provocation tests, tenderness of sup shoulder, ant GH joint and ant humeral head.      Precautions / Restrictions : 12 weeks pregnant    Assessment:     HEP/POC compliance is  good .  The patient continues to have popping in her shoulder, but is better than before and less painful.  She has been compliant with her HEP and tolerates treatment well.  She is appropriate to continue with skilled PT services at this time.    Goal Status:  Pt. will demonstrate/verbalize independence in self-management of condition in : 6 weeks - progressing    Pt. will be independent with home exercise program in : 6 weeks   Met: doing them 1-2x/day  Pt. will have improved quality of sleep: getting 75-90% of required amount;in 6 weeks  - Progressing, sleeping better at night  Patient will work: with restrictions;in 6 weeks - not  Met, has not returned to work  Patient will perform repetitive movement in : arm;for work;with no pain;in 12 weeks - patient feels she could do 3 hours at this time  Pt will: be able to reach above shoulder height, overhead and behind back without increased pain in 8 weeks.- progressing, still has pain with certain movements    Plan / Patient Education:      Authorization / Certification Number of Visits:   Communication with: Referral Source  Patient Related Instruction: Nature of Condition;Treatment plan and rationale;Self Care instruction;Basis of treatment;Body mechanics;Posture  Times per Week: 1 to every other week  Number of Weeks: 8  Number of Visits: 4-8  Discharge Planning: Patient will be discharged when independent in self-management of condition or when goals are met.  Precautions / Restrictions : 36 weeks pregnant  Therapeutic Exercise: ROM;Stretching;Strengthening  Neuromuscular Reeducation: kinesio tape;posture;core  Manual Therapy: soft tissue mobilization;myofascial release;muscle energy;joint mobilization;strain counterstrain  Modalities: electrical stimulation;ultrasound    Progress with home program as tolerated.  Progress scapular and RC strengthening as well as general shoulder strengthening.  Shoulder stability.    Subjective:     Pain Ratin  The patient reports that her shoulder hurts.  It has been popping.  This occurs when pushing herself up and lifting above her shoulder.  It still feels like the bone on bone.  It used to pop much louder before and was more painful before.    Objective:     Shoulder ROM is WNL.  Pain with shoulder ER.  Popping with pain at end range shoulder ext/IR.  Tenderness over supraspinatus tendon and bicep tendon R.    Exercise #1: scap retraction  Comment #1: HEP  Exercise #2: pec stretch over towel roll  Comment #2: HEP  Exercise #3: rows  Comment #3: L3 band HEP; patient to try bent over with 1# weight  Exercise #4: Shoulder extension with band  Comment #4: L1 HEP  Exercise #5: Shoulder band exercises  Comment #5: L2 band ER, L1 band IR HEP  Exercise #6: Bicep curls  Comment #6: Palm up and thumb up L3 HEP; palm down L3 HEP  Exercise #7: Shoulder flexion strengthening  Comment #7: 1# weight HEP  Exercise #8: Wall pushups  Comment #8: HEP  Exercise #9: Chair pushups  Comment #9: Discussed    Appt time:  3:47PM - 4:15PM    Treatment Today     TREATMENT MINUTES COMMENTS   Evaluation     Self-care/ Home management     Manual therapy 12 -Supine XFM to R bicep tendon x 2 minutes followed by 1 minute passive stretch x 2 sets  -Supine supraspinatus XFM x 2 minutes x 2 sets   Neuromuscular Re-education     Therapeutic Activity     Therapeutic Exercises 16 -Subjective measures taken  -See flow sheet; added shoulder stability with ball to HEP  -Discussed POC   Gait training     Modality__________________                Total 28    Blank areas are intentional and mean the treatment did not include these items.       Alley Santiago, PT, DPT  1/26/2021

## 2021-06-14 NOTE — PROGRESS NOTES
[facility] Daily Progress     Patient Name: Deborah Swartz  Date: 1/18/2021  Visit #: 11  PTA visit #:  NA  Referral Diagnosis: Right shoulder injury, subsequent encounter  Referring provider: Uli Yeung, *  Visit Diagnosis:     ICD-10-CM    1. Right shoulder pain, unspecified chronicity  M25.511      Assessment from Initial Evaluation:  Deborah Swartz is a 25 y.o. female who presents to therapy today with chief complaints of (R) shoulder and arm pain. Onset date of sx was 6/28/20, work related.  Functional impairments include reaching in all directions, lifting, working, sleeping, personal cares, grooming, lying on (R) side.  Clinical findings include decreased shoulder A/PROM, mild pain w,ith resisted testing, mild pain with provocation tests, tenderness of sup shoulder, ant GH joint and ant humeral head.      Precautions / Restrictions : 12 weeks pregnant    Assessment:     HEP/POC compliance is  good .  The patient demonstrates overall improvements in her shoulder pain and has been able to lift more.  She is now able to lift her daughter up and hold her above her head without pain.  She is appropriate to follow up next week and will determine POC after that.    Goal Status:  Pt. will demonstrate/verbalize independence in self-management of condition in : 6 weeks - progressing    Pt. will be independent with home exercise program in : 6 weeks   Met: doing them 1-2x/day  Pt. will have improved quality of sleep: getting 75-90% of required amount;in 6 weeks  - Progressing, sleeping better at night  Patient will work: with restrictions;in 6 weeks - not  Met, has not returned to work  Patient will perform repetitive movement in : arm;for work;with no pain;in 12 weeks - patient feels she could do 3 hours at this time    Pt will: be able to reach above shoulder height, overhead and behind back without increased pain in 8 weeks.- progressing, still has pain with certain movements    Plan / Patient  Education:     Continue with initial plan of care.  Progress with home program as tolerated.  Progress scapular and RC strengthening as well as general shoulder strengthening.    Subjective:     Pain Rating: 3  The patient reports that she has been doing a lot of stuff around the house.  She has been using her shoulder a lot.  She needs to bring the restrictions for her shoulder up to 25#.  She can comfortably lift her daughter and hold her up a little without pain.  She weighs 22#.  She still uses it to get up, even though it hurts.      Objective:     Shoulder ROM is WNL.  Most difficulty with IR/ext.  Tenderness over supraspinatus tendon and bicep tendon R.    Exercise #1: scap retraction  Comment #1: HEP  Exercise #2: pec stretch over towel roll  Comment #2: HEP  Exercise #3: rows  Comment #3: L3 band HEP; patient to try bent over with 1# weight  Exercise #4: Shoulder extension with band  Comment #4: L1 HEP  Exercise #5: Shoulder band exercises  Comment #5: L2 band ER, L1 band IR HEP  Exercise #6: Bicep curls  Comment #6: Palm up and thumb up L3 HEP; palm down L3 HEP  Exercise #7: Shoulder flexion strengthening  Comment #7: 1# weight HEP  Exercise #8: Wall pushups  Comment #8: HEP  Exercise #9: Chair pushups  Comment #9: Discussed    Appt time: 3:52PM - 4:18PM    Treatment Today     TREATMENT MINUTES COMMENTS   Evaluation     Self-care/ Home management     Manual therapy 12 -Supine XFM to R bicep tendon x 2 minutes followed by 1 minute passive stretch x 2 sets  -Supine supraspinatus XFM x 2 minutes x 2 sets   Neuromuscular Re-education     Therapeutic Activity     Therapeutic Exercises 14 -Subjective measures taken  -See flow sheet; added chair pushups to HEP for shoulder strengthening  -Patient educated on rolling onto her side to get up   Gait training     Modality__________________                Total 26    Blank areas are intentional and mean the treatment did not include these items.       Alley BOWEN  Jack, PT, DPT  1/18/2021

## 2021-06-14 NOTE — PROGRESS NOTES
[facility] Daily Progress     Patient Name: Deborah Swartz  Date: 1/4/2021  Visit #: 10  PTA visit #:  NA  Referral Diagnosis: Right shoulder injury, subsequent encounter  Referring provider: Uli Yeung, *  Visit Diagnosis:     ICD-10-CM    1. Right shoulder pain, unspecified chronicity  M25.511      Assessment from Initial Evaluation:  Deborah Swartz is a 25 y.o. female who presents to therapy today with chief complaints of (R) shoulder and arm pain. Onset date of sx was 6/28/20, work related.  Functional impairments include reaching in all directions, lifting, working, sleeping, personal cares, grooming, lying on (R) side.  Clinical findings include decreased shoulder A/PROM, mild pain w,ith resisted testing, mild pain with provocation tests, tenderness of sup shoulder, ant GH joint and ant humeral head.      Precautions / Restrictions : 12 weeks pregnant    Assessment:     HEP/POC compliance is  good .  The patient continues to have difficulty pushing with her arm.  Wall pushups were initiated today to address this.  She was able to tolerate treatment well and feels it is improving.  She is appropriate to continue with skilled PT services at this time.    Goal Status:  Pt. will demonstrate/verbalize independence in self-management of condition in : 6 weeks - progressing    Pt. will be independent with home exercise program in : 6 weeks   Met: doing them 1-2x/day  Pt. will have improved quality of sleep: getting 75-90% of required amount;in 6 weeks  - Progressing, sleeping better at night  Patient will work: with restrictions;in 6 weeks - not  Met, has not returned to work  Patient will perform repetitive movement in : arm;for work;with no pain;in 12 weeks - patient feels she could do 3 hours at this time    Pt will: be able to reach above shoulder height, overhead and behind back without increased pain in 8 weeks.- progressing, still has pain with certain movements    Plan / Patient  Education:     Continue with initial plan of care.  Progress with home program as tolerated.  Progress scapular and RC strengthening as well as general shoulder strengthening.    Subjective:     Pain Rating: 3  The patient reports that she hasn't been focused on her arm.  She fell in a snowbank when she went to the [facility] and was painful for a couple of days.  Her arm hurts when she is trying to get out of bed in the morning.     Objective:     Shoulder ROM is WNL.  Tenderness over supraspinatus tendon and bicep tendon R.    Exercise #1: scap retraction  Comment #1: HEP  Exercise #2: pec stretch over towel roll  Comment #2: HEP  Exercise #3: rows  Comment #3: L3 band HEP; also did bent over row with R UE x 8 without weight; patient to try 1# weight  Exercise #4: Shoulder extension with band  Comment #4: L1 HEP  Exercise #5: Shoulder band exercises  Comment #5: L2 band ER, L1 band IR HEP  Exercise #6: Bicep curls  Comment #6: Palm up and thumb up L3 HEP; palm down L3 HEP  Exercise #7: Shoulder flexion strengthening  Comment #7: 1# weight HEP  Exercise #8: Wall pushups  Comment #8: x 8    Appt time: 3:45PM - 4:10PM    Treatment Today     TREATMENT MINUTES COMMENTS   Evaluation     Self-care/ Home management     Manual therapy 12 -Supine XFM to R bicep tendon x 2 minutes followed by 1 minute passive stretch x 2 sets  -Supine supraspinatus XFM x 2 minutes x 2 sets   Neuromuscular Re-education     Therapeutic Activity     Therapeutic Exercises 13 -Subjective measures taken  -See flow sheet; added wall pushups and bent over rows to HEP for functional strengthening   Gait training     Modality__________________                Total 25    Blank areas are intentional and mean the treatment did not include these items.       Alley Santiago, PT, DPT  1/4/2021

## 2021-06-14 NOTE — PROGRESS NOTES
[facility] Daily Progress     Patient Name: Deborah Swartz  Date: 12/28/2020  Visit #: 9  PTA visit #:  NA  Referral Diagnosis: Right shoulder injury, subsequent encounter  Referring provider: Uli Yeung, *  Visit Diagnosis:     ICD-10-CM    1. Right shoulder pain, unspecified chronicity  M25.511      Assessment from Initial Evaluation:  Deborah Swartz is a 25 y.o. female who presents to therapy today with chief complaints of (R) shoulder and arm pain. Onset date of sx was 6/28/20, work related.  Functional impairments include reaching in all directions, lifting, working, sleeping, personal cares, grooming, lying on (R) side.  Clinical findings include decreased shoulder A/PROM, mild pain with resisted testing, mild pain with provocation tests, tenderness of sup shoulder, ant GH joint and ant humeral head.      Precautions / Restrictions : 12 weeks pregnant    Assessment:     HEP/POC compliance is  good .  The patient demonstrates continued improvements in her pain level and function.  She continues to have full ROM and is having less pain during random times compared to before.  She is progressing well and is appropriate to continue with skilled PT services at this time.    Goal Status:  Pt. will demonstrate/verbalize independence in self-management of condition in : 6 weeks - progressing    Pt. will be independent with home exercise program in : 6 weeks   Met: doing them 1-2x/day  Pt. will have improved quality of sleep: getting 75-90% of required amount;in 6 weeks  - Progressing, sleeping better at night  Patient will work: with restrictions;in 6 weeks - not  Met, has not returned to work  Patient will perform repetitive movement in : arm;for work;with no pain;in 12 weeks - patient feels she could do 3 hours at this time    Pt will: be able to reach above shoulder height, overhead and behind back without increased pain in 8 weeks.- progressing, still has pain with certain movements    Plan /  Patient Education:     Continue with initial plan of care.  Progress with home program as tolerated.  Progress scapular and RC strengthening as well as general shoulder strengthening.    Subjective:     Pain Ratin  The patient reports that her shoulder is doing pretty well.  She has pain with lifting her arm and reaching behind her back, but is not getting random pains anymore.      Objective:     Shoulder ROM is WNL; mild pain with shoulder abduction.  Tenderness over supraspinatus tendon and bicep tendon R.    Exercise #1: scap retraction  Comment #1: HEP  Exercise #2: pec stretch over towel roll  Comment #2: HEP  Exercise #3: rows  Comment #3: L3 band HEP  Exercise #4: Shoulder extension with band  Comment #4: L1 x 10  Exercise #5: Shoulder band exercises  Comment #5: L2 band ER, L1 band IR HEP  Exercise #6: Bicep curls  Comment #6: Palm up and thumb up L3 HEP; palm down L3 HEP  Exercise #7: Shoulder flexion strengthening  Comment #7: 1# weight HEP; discussed increasing to 2 sets of 6 to progress repetitions of exercise    Appt time: 3:50PM - 4:15PM    Treatment Today     TREATMENT MINUTES COMMENTS   Evaluation     Self-care/ Home management     Manual therapy 12 -Supine XFM to R bicep tendon x 2 minutes followed by 1 minute passive stretch x 2 sets  -Supine supraspinatus XFM x 2 minutes x 2 sets   Neuromuscular Re-education     Therapeutic Activity     Therapeutic Exercises 13 -Subjective measures taken  -See flow sheet; progressed shoulder extension isometric to L1 band as patient was ready to progress   Gait training     Modality__________________                Total 25    Blank areas are intentional and mean the treatment did not include these items.       Alley Santiago, PT, DPT  2020

## 2021-06-14 NOTE — PROGRESS NOTES
"  Assessment & Plan     Right shoulder pain, unspecified chronicity  Work comp injury since August 2020  Managed with  physical therapy  Not at 100%, trial of return to work on restriction  Work note given to return on 1/13/2021 with weight lifting restriction of 10 pounds for the next 2 weeks with a follow-up reassessment at that time.      20 minutes spent on the date of the encounter doing chart review, patient visit and documentation          BMI:   Estimated body mass index is 34.75 kg/m  as calculated from the following:    Height as of 10/9/20: 5' 3\" (1.6 m).    Weight as of this encounter: 196 lb 3 oz (89 kg).       No follow-ups on file.    Uli Yeung MD  [facility]     Deborah Swartz is 25 y.o. and presenting in follow up of work comp case of right shoulder pain, ongoing since last August, requesting a letter to return to work on restrictions as of 1/13/2021.           Objective    /74 (Patient Site: Right Arm, Patient Position: Sitting, Cuff Size: Adult Regular)   Pulse 78   Wt 196 lb 3 oz (89 kg)   LMP 01/29/2020 (Approximate)   Breastfeeding No   BMI 34.75 kg/m    Body mass index is 34.75 kg/m .  Physical Exam                "

## 2021-06-15 NOTE — TELEPHONE ENCOUNTER
Pt called in ask question about Rx.  The question is answered, no other concern at this time.      Sen Arguelles RN, [facility] Triage/Med Refill 2/15/2021 5:26 PM

## 2021-06-15 NOTE — ANESTHESIA POSTPROCEDURE EVALUATION
Patient: Deborah Swartz  Procedure(s):   SECTION  Anesthesia type: spinal    Patient location: Labor and Delivery  Last vitals:   Vitals Value Taken Time   /80 21 0330   Temp 36.7  C (98  F) 21 0330   Pulse 66 21 0330   Resp 16 21 0600   SpO2 98 % 21 06     Post vital signs: stable  Level of consciousness: awake and responds to simple questions  Post-anesthesia pain: pain controlled  Post-anesthesia nausea and vomiting: no  Pulmonary: unassisted, return to baseline  Cardiovascular: stable and blood pressure at baseline  Hydration: adequate  Anesthetic events: no    QCDR Measures:  ASA# 11 - Alba-op Cardiac Arrest: ASA11B - Patient did NOT experience unanticipated cardiac arrest  ASA# 12 - Alba-op Mortality Rate: ASA12B - Patient did NOT die  ASA# 13 - PACU Re-Intubation Rate: ASA13B - Patient did NOT require a new airway mgmt  ASA# 10 - Composite Anes Safety: ASA10A - No serious adverse event    Additional Notes:

## 2021-06-15 NOTE — PROGRESS NOTES
[facility] Daily Progress     Patient Name: Deborah Swartz  Date: 2/9/2021  Visit #: 13  PTA visit #:  NA  Referral Diagnosis: Right shoulder injury, subsequent encounter  Referring provider: Uli Yeung, *  Visit Diagnosis:     ICD-10-CM    1. Right shoulder pain, unspecified chronicity  M25.511      Assessment from Initial Evaluation:  Deborah Swartz is a 25 y.o. female who presents to therapy today with chief complaints of (R) shoulder and arm pain. Onset date of sx was 6/28/20, work related.  Functional impairments include reaching in all directions, lifting, working, sleeping, personal cares, grooming, lying on (R) side.  Clinical findings include decreased shoulder A/PROM, mild pain w,ith resisted testing, mild pain with provocation tests, tenderness of sup shoulder, ant GH joint and ant humeral head.     Precautions / Restrictions : 12 weeks pregnant    Assessment:     HEP/POC compliance is  good .  The patient was able to tolerate treatment well today.  She has been instructed on performing her shoulder stability exercise with shoulder in a protracted position.  This was more fatiguing for patient.  She is having her baby next week, so PT will be held until after delivery.  Will hold her chart for 6 weeks for her to return within that timeframe in order to help patient return to work after her maternity leave.    Goal Status:  Pt. will demonstrate/verbalize independence in self-management of condition in : 6 weeks - progressing    Pt. will be independent with home exercise program in : 6 weeks   Met: doing them 1-2x/day  Pt. will have improved quality of sleep: getting 75-90% of required amount;in 6 weeks  - Progressing, sleeping better at night  Patient will work: with restrictions;in 6 weeks - not  Met, has not returned to work  Patient will perform repetitive movement in : arm;for work;with no pain;in 12 weeks - patient feels she could do 3 hours at this time  Pt will: be able to reach  above shoulder height, overhead and behind back without increased pain in 8 weeks.- progressing, still has pain with certain movements    Plan / Patient Education:     Progress with home program as tolerated.  Progress scapular and RC strengthening as well as general shoulder strengthening.  Shoulder stability.    Subjective:     Pain Ratin  The patient reports that her shoulder is still weak and painful.  She can do much more with it than a few months ago.  She gets the most pain with lifting.  The popping isn't as deep as it was before.    Objective:     Shoulder ROM is WNL.  Mild pain with shoulder ER.  Tenderness over supraspinatus tendon and bicep tendon R.    Exercise #1: scap retraction  Comment #1: HEP  Exercise #2: pec stretch over towel roll  Comment #2: HEP  Exercise #3: rows  Comment #3: L3 band HEP; patient to try bent over with 1# weight  Exercise #4: Shoulder extension with band  Comment #4: L1 HEP  Exercise #5: Shoulder band exercises  Comment #5: L2 band ER, L1 band IR HEP  Exercise #6: Bicep curls  Comment #6: Palm up and thumb up L3 HEP; palm down L3 HEP  Exercise #7: Shoulder flexion strengthening  Comment #7: 1# weight HEP  Exercise #8: Wall pushups  Comment #8: HEP  Exercise #9: Chair pushups  Comment #9: HEP  Exercise #10: Shoulder stabilization  Comment #10: Ball on wall up/down, side to side, CW/CCW x 10 seconds each with shoulder retracted and shoulder protracted - patient to do protraction at home    Appt time: 3:03PM - 3:30PM    Treatment Today     TREATMENT MINUTES COMMENTS   Evaluation     Self-care/ Home management     Manual therapy 12 -Supine XFM to R bicep tendon x 2 minutes followed by 1 minute passive stretch x 2 sets  -Supine supraspinatus XFM x 2 minutes x 2 sets   Neuromuscular Re-education     Therapeutic Activity     Therapeutic Exercises 15 -Subjective measures taken  -See flow sheet; patient to perform ball stability with shoulder protraction  -Discussed POC; hold PT  until after delivery of her baby which is occurring next week   Gait training     Modality__________________                Total 27    Blank areas are intentional and mean the treatment did not include these items.       Alley Santiago, PT, DPT  2/9/2021

## 2021-06-15 NOTE — TELEPHONE ENCOUNTER
Pt is calling for Refill and update Pharmacy information.    Refill Request  Did you contact pharmacy: No.  Patient was informed to call the pharmacy.  Medication name:   serraline - 90 day supply  Prenatal         Who prescribed the medication: PCP  Requested Pharmacy: [facility] [address]  Is patient out of medication: Yes  Patient notified refills processed in 3 business days:  yes  Okay to leave a detailed message: yes

## 2021-06-15 NOTE — ANESTHESIA PREPROCEDURE EVALUATION
Anesthesia Evaluation      Patient summary reviewed     Airway   Mallampati: II  Neck ROM: full   Pulmonary - normal exam   (+) asthma                           Cardiovascular - negative ROS and normal exam   Neuro/Psych - negative ROS     Endo/Other    (+) obesity, pregnant     GI/Hepatic/Renal - negative ROS           Dental - normal exam                        Anesthesia Plan  Planned anesthetic: spinal    COVID neg 2/11  ASA 2     Anesthetic plan and risks discussed with: patient    Post-op plan: routine recovery

## 2021-06-15 NOTE — TELEPHONE ENCOUNTER
Last Office Visit  1/11/2021 Dr Yeung    Notes:  Right shoulder pain, unspecified chronicity  Work comp injury since August 2020  Managed with  physical therapy  Not at 100%, trial of return to work on restriction  Work note given to return on 1/13/2021 with weight lifting restriction of 10 pounds for the next 2 weeks with a follow-up reassessment at that time.    Last Filled:  sertraline (ZOLOFT) 50 MG tablet     --   Sig: sertraline 50 mg tablet   Class: Historical Med     prenatal vit no.130-iron-folic (PRENATAL VITAMIN) 27 mg iron- 800 mcg Tab tablet 90 tablet 3 7/20/2020  --   Sig - Route: Take 1 tablet by mouth daily. - Oral   Class: OTC       Next OV:  Visit date not found        Medication teed up for provider signature

## 2021-06-15 NOTE — ANESTHESIA CARE TRANSFER NOTE
Last vitals:   Vitals:    02/12/21 1448   BP: 134/74   Pulse: 78   Resp: 16   Temp: 36.7  C (98.1  F)   SpO2: 98%     Patient's level of consciousness is awake  Spontaneous respirations: yes  Maintains airway independently: yes  Dentition unchanged: yes  Oropharynx: oropharynx clear of all foreign objects    QCDR Measures:  ASA# 20 - Surgical Safety Checklist: WHO surgical safety checklist completed prior to induction    PQRS# 430 - Adult PONV Prevention: 4558F - Pt received => 2 anti-emetic agents (different classes) preop & intraop  ASA# 8 - Peds PONV Prevention: NA - Not pediatric patient, not GA or 2 or more risk factors NOT present  PQRS# 424 - Alba-op Temp Management: 4559F - At least one body temp DOCUMENTED => 35.5C or 95.9F within required timeframe  PQRS# 426 - PACU Transfer Protocol: - Transfer of care checklist used  ASA# 14 - Acute Post-op Pain: ASA14B - Patient did NOT experience pain >= 7 out of 10

## 2021-06-15 NOTE — ANESTHESIA PROCEDURE NOTES
Peripheral Block    Patient location during procedure: OR  Start time: 2/12/2021 2:31 PM  End time: 2/12/2021 2:37 PM  post-op analgesia per surgeon order as noted in medical record  Staffing:  Performing  Anesthesiologist: Kobi Verdugo MD  Preanesthetic Checklist  Completed: patient identified, site marked, risks, benefits, and alternatives discussed, timeout performed, consent obtained, airway assessed, oxygen available, suction available, emergency drugs available and hand hygiene performed  Peripheral Block  Block type: other, TAP  Prep: ChloraPrep  Patient position: supine  Patient monitoring: cardiac monitor, continuous pulse oximetry, heart rate and blood pressure  Laterality: bilateral, same technique used bilaterally  Injection technique: ultrasound guided    Ultrasound used to visualize needle placement in proximity to nerve being blocked: yes   US used to visualize anesthetic spread  Visualized anatomic structures normal  No Pathological Findings  Permanent ultrasound image captured for medical record  Sterile gel and probe cover used for ultrasound.  Needle  Needle type: Stimuplex   Needle gauge: 20G  Needle length: 6 in  no peripheral nerve catheter placed  Assessment  Injection assessment: no difficulty with injection, negative aspiration for heme, no paresthesia on injection and incremental injection          
Spinal Block    Patient location during procedure: OB  Start time: 2/12/2021 1:39 PM  End time: 2/12/2021 1:47 PM  Reason for block: primary anesthetic    Staffing:  Performing  Anesthesiologist: Magalys Goncalves MD    Preanesthetic Checklist  Completed: patient identified, risks, benefits, and alternatives discussed, timeout performed, consent obtained, airway assessed, oxygen available, suction available, emergency drugs available and hand hygiene performed  Spinal Block  Patient position: sitting  Prep: ChloraPrep  Patient monitoring: heart rate, cardiac monitor, continuous pulse ox and blood pressure  Approach: midline  Location: L3-4  Injection technique: single-shot  Needle type: pencil-tip   Needle gauge: 24 G      Additional Notes:  2 attempts. First attempt at lower level not successful with CSF aspiration. Second attempt with easy pass and CSF aspiration prior to medication injection.           
No

## 2021-06-17 ENCOUNTER — AMBULATORY - HEALTHEAST (OUTPATIENT)
Dept: PEDIATRICS | Facility: CLINIC | Age: 26
End: 2021-06-17

## 2021-06-17 NOTE — TELEPHONE ENCOUNTER
Patient called [facility].  Patient missed today's appointment.  Patient was rescheduled to 6/8/21 and understands to call and cancel if unable to come to next appointment.  Alley Santiago, PT, DPT

## 2021-06-18 NOTE — PATIENT INSTRUCTIONS - HE
Patient Instructions by Aaron Ewing MD at 9/11/2020  7:40 AM     Author: Aaron Ewing MD Service: -- Author Type: Physician    Filed: 9/11/2020  7:59 AM Encounter Date: 9/11/2020 Status: Signed    : Aaron Ewing MD (Physician)         Patient Education     Shoulder Impingement Syndrome  The rotator cuff is a group of muscles and tendons that surround the shoulder joint. These muscles and tendons hold the arm in its joint. They help the shoulder move. The rotator cuff muscles and tendons can become irritated from repeated rubbing against the shoulder bone. This is called shoulder impingement syndrome or rotator cuff tendonitis.     If your case is mild, you may only need to rest the shoulder and then do certain exercises to strengthen the muscles. You can also take anti-inflammatory medicines. Steroid injections into the shoulder can ease inflammation. But you can have only a limited number of these. If the condition gets worse, your shoulder muscles may become thin and weak. This can lead to a rotator cuff tear.  Symptoms of shoulder impingement syndrome may include:    Shoulder pain that gets worse when you raise your arm overhead    Weakness of the shoulder muscles when you use your arm overhead    Popping and clicking when you move your shoulder    Shoulder pain that wakes you up at night, especially when you sleep on the affected shoulder    Sudden pain in your shoulder when you lift or reach  Home care  Follow these tips to take care of yourself at home:    Avoid activities that make your pain worse. These include raising your arms overhead, repeating the same motion over and over, or lifting heavy objects.    Dont hold your arm in one position for a long time. Keep it moving.    Put an ice pack on the sore area for 20 minutes every 1 to 2 hours for the first day. You can make an ice pack by putting ice cubes in a plastic bag. Wrap the bag in a towel before putting it on your shoulder.  A frozen bag of peas or something similar can also be used as an ice pack. Use the ice packs 3 to 4 times a day for the next 2 days. Continue using the ice to relieve of pain and swelling as needed.    You may take acetaminophen or ibuprofen to control pain, unless another medicine was prescribed. If prednisone was prescribed, dont take anti-inflammatory medicines. If you have chronic liver or kidney disease or ever had a stomach ulcer or gastrointestinal bleeding, talk with your doctor before using these medicines.    After your symptoms ease, you may get physical therapy or start a home exercise program. This can strengthen your shoulder muscles and help your range of motion. Talk with your doctor about what is best for your condition.  Follow-up care  Follow up with your healthcare provider, or as advised.  When to seek medical advice  Call your healthcare provider right away if any of these occur:    Shoulder pain that gets worse and wakes you up at night    Your shoulder or arm swells    Numbness, tingling, or pain that travels down the arm to the hand    Loss of shoulder strength    Fever or chills  Date Last Reviewed: 8/1/2016 2000-2017 [employer]. [address] [place] [address]. All rights reserved. This information is not intended as a substitute for professional medical care. Always follow your healthcare professional's instructions.

## 2021-06-18 NOTE — PATIENT INSTRUCTIONS - HE
Patient Instructions by Alley Santiago PT at 1/26/2021  3:45 PM     Author: Alley Santiago PT Service: -- Author Type: Physical Therapist    Filed: 1/26/2021  4:13 PM Encounter Date: 1/26/2021 Status: Signed    : Alley Santiago PT (Physical Therapist)         Shoulder stabilzation with mini ball up and down  Also do side to side and clockwise/counter clockwise  Start with 10-15 seconds each direction x 2 sets  Work up to 30 seconds 2-3 sets each direction

## 2021-06-18 NOTE — PATIENT INSTRUCTIONS - HE
Patient Instructions by Alley Santiago PT at 1/18/2021  3:45 PM     Author: Alley Santiago PT Service: -- Author Type: Physical Therapist    Filed: 1/18/2021  4:16 PM Encounter Date: 1/18/2021 Status: Signed    : Alley Santiago PT (Physical Therapist)         Chair push-up    Scoot to edge of chair  Place arms on arm-rests   Lean forward   Push buttock up off chair   x10-15 repetitions  1-2 sets

## 2021-06-18 NOTE — PATIENT INSTRUCTIONS - HE
Patient Instructions by Alley Santiago PT at 10/22/2020  8:45 AM     Author: Alley Santiago PT Service: -- Author Type: Physical Therapist    Filed: 10/22/2020  9:15 AM Encounter Date: 10/22/2020 Status: Signed    : Alley Santiago PT (Physical Therapist)        ISOMETRIC FLEXION    Gently push your fist forward into a wall with your elbow bent.    Hold 5 seconds  x10-15 repetitions  1-2 sets    ISOMETRIC EXTENSION    Gently push your a bent elbow back into a wall.    Hold 5 seconds  x10-15 repetitions  1-2 sets

## 2021-06-18 NOTE — PATIENT INSTRUCTIONS - HE
Patient Instructions by Alley Santiago PT at 12/10/2020  3:45 PM     Author: Alley Santiago PT Service: -- Author Type: Physical Therapist    Filed: 12/10/2020  4:10 PM Encounter Date: 12/10/2020 Status: Signed    : Alley Santiago PT (Physical Therapist)                       ELASTIC BAND BICEP CURLS - BRACHIALIS    With your arm at your side holding an elastic band, draw up your hand by bending at the elbow.     Keep your palm face down the entire time.    x10-15 repetitions  1-2 sets

## 2021-06-18 NOTE — PATIENT INSTRUCTIONS - HE
Patient Instructions by Alley Santiago PT at 12/28/2020  3:45 PM     Author: Alley Santiago PT Service: -- Author Type: Physical Therapist    Filed: 12/28/2020  4:13 PM Encounter Date: 12/28/2020 Status: Signed    : Alley Santiago PT (Physical Therapist)        SHOULDER EXTENSION/PULL DOWNS with band    While holding an elastic band with both arms in front of you with your elbows straight, pull the band downwards and back towards your side.    x10-15 repetitions  1-2 sets  Hold 5 seconds

## 2021-06-18 NOTE — PATIENT INSTRUCTIONS - HE
Patient Instructions by Janae Doe PT at 8/13/2020 11:30 AM     Author: Janae Doe PT Service: -- Author Type: Physical Therapist    Filed: 8/13/2020 12:34 PM Encounter Date: 8/13/2020 Status: Signed    : Janae Doe PT (Physical Therapist)        SCAPULAR RETRACTIONS    Draw your shoulder blades back and down.    ELASTIC BAND ROWS     Holding elastic band with both hands, draw back the band as you bend your elbows. Keep your elbows near the side of your body.                          ISOMETRIC EXTERNAL ROTATION     Gently press your hand into a wall using the back side of your hand.  Maintain a bent elbow the entire time.    ISOMETRIC INTERNAL ROTATION     Gently press your hand into a wall using the palm side of your hand.  Maintain a bent elbow the entire time.     .xl

## 2021-06-18 NOTE — PATIENT INSTRUCTIONS - HE
Patient Instructions by Alley Santiago PT at 10/29/2020  2:45 PM     Author: Alley Santiago PT Service: -- Author Type: Physical Therapist    Filed: 10/29/2020  3:12 PM Encounter Date: 10/29/2020 Status: Signed    : Alley Santiago PT (Physical Therapist)                          ELASTIC BAND BICEPS CURLS    With your arm at your side holding an elastic band, draw up your hand by bending at the elbow.     Keep your palm face up the entire time.    x10-15 repetitions  1-2 sets                          ELASTIC BAND BICEP CURLS - BRACHIORADIALIS - HAMMER CURL    With your arm at your side holding an elastic band, draw up your hand by bending at the elbow.     Keep your palm facing sideways the entire time.    x10-15 repetitions  1-2 sets      ELASTIC BAND SHOULDER INTERNAL ROTATION    While holding an elastic band at your side with your elbow bent, start with your hand away from your stomach, then pull the band towards your stomach. Keep your elbow near your side the entire time.    Do 10-15 repetitions.  1-2 sets

## 2021-06-18 NOTE — PATIENT INSTRUCTIONS - HE
Patient Instructions by Jaylyn Howard CNP at 7/20/2020  1:30 PM     Author: Jaylyn Howard CNP Service: -- Author Type: Nurse Practitioner    Filed: 7/20/2020  2:34 PM Encounter Date: 7/20/2020 Status: Addendum    : Jaylyn Howard CNP (Nurse Practitioner)    Related Notes: Original Note by Jaylyn Howard CNP (Nurse Practitioner) filed at 7/20/2020  2:22 PM       Please talk to your primary care provider regarding prenatal care.  Schedule an appointment for further care.  Or you may call [facility] or other OBGYN office as per your insurance/preference to schedule your first appointment within a week or so.      Start prenatal vitamins today, take every day.        Patient Education     Adapting to Pregnancy: First Trimester    As your body adjusts, you may have to change or limit your daily activities. Youll need more rest. You may also need to use the energy you have more wisely.  Your changing body  Almost every part of your body is affected as you adapt to pregnancy. The uterus and cervix will begin to soften right away. You may not look very pregnant during the first 3 months. But you are likely to have some common signs of early pregnancy:    Nausea    Fatigue    Frequent urination    Mood swings    Bloating of the belly    Constipation    Heartburn    Missed or light periods (first trimester bleeding)    Nipple or breast tenderness and breast swelling  Its not too late to start good habits  What matters most is protecting your baby from this moment on. If you smoke, drink alcohol, or use drugs, now is the time to stop. If you need help, talk with your healthcare provider:    Smoking increases the risk of stillbirth or having a low-birth-weight baby. If you smoke, quit now.    Alcohol and drugs have been linked with miscarriage, birth defects, intellectual disability, and low birth weight. Do not drink alcohol or take drugs.  Tips to relieve nausea  Although nausea can happen at any time of  the day, it may be worse in the morning. To help prevent nausea:    Eat small, light meals at frequent intervals.    Drink fluids often.    Get up slowly. Eat a few unsalted crackers before you get out of bed.    Avoid smells that bother you.    Avoid spicy and fatty foods.    Eat an ice pop in your favorite flavor.    Get plenty of rest.    Ask your healthcare provider about taking carrillo or vitamin B6 for nausea and vomiting.    Talk with your healthcare provider if you take vitamins that upset your stomach.  Work concerns  The end of the first trimester is a good time to discuss working during pregnancy with your employer. Follow your healthcare providers advice if your job needs you to stand for a long time, work with hazardous tools, or even sit at a desk all day. Your workspace, workload, or scheduled hours may need to be adjusted. Perhaps you can change body postures more often or take an extra break.  Advice for travel  Talk to your healthcare provider first, but the second trimester may be the best time for any travel. You may be advised to avoid certain trips while youre pregnant. Food and water can be concerns in developing countries. Travel by car is a good choice, as you can stop, get out, and stretch. Bring snacks and water along. Fasten the lap belt below your belly, low over your hips. Also be sure to wear the shoulder harness.  Intimacy  Unless your healthcare provider tells you to, there is no reason to stop having sex while youre pregnant. You or your partner may notice changes in desire. Desire may be less in the first trimester, due to nausea and fatigue. In the second trimester, sex may be very enjoyable. The third trimester can be a challenge comfort-wise. Try different positions and see whats best for you both.  Date Last Reviewed: 10/1/2017    2707-5175 [employer]. [address], [place] [address]. All rights reserved. This information is not intended as a substitute  for professional medical care. Always follow your healthcare professional's instructions.

## 2021-06-18 NOTE — PATIENT INSTRUCTIONS - HE
Patient Instructions by Alley Santiago PT at 10/12/2020  2:15 PM     Author: Alley Santiago PT Service: -- Author Type: Physical Therapist    Filed: 10/12/2020  2:46 PM Encounter Date: 10/12/2020 Status: Signed    : Alley Santiago PT (Physical Therapist)        ELASTIC BAND ROWS     Holding elastic band with both hands, draw back the band as you bend your elbows. Keep your elbows near the side of your body.    x10-15 repetitions  1-2 sets  Hold 5 seconds        ELASTIC BAND BILATERAL EXTERNAL ROTATION    While holding an elastic band with your elbows bent, pull your hands away from your stomach area. Keep  your elbows near the side of your body.    x10-15 repetitions  1-2 sets  Work up to 20-25 repetitions if you can

## 2021-06-20 NOTE — LETTER
Letter by Clarence Tan MD at      Author: Clarence Tan MD Service: -- Author Type: --    Filed:  Encounter Date: 8/7/2020 Status: (Other)         8/7/2020  Deborah Swartz      To Whom It May Concern,    Deborah Swartz was seen today for ongoing shoulder pain/shoulder re-injury    She should have the following restrictions in place today through 8/28/2020    No work with right arm above chest, no reaching with that arm with extension of elbow/shoulder at any level.  No lifting with right arm above 5 pounds.    Patient should be excused from work if such a task cannot be offered.      Feel free to contact me with questions.    Sincerely,          Clarence Tan MD

## 2021-06-20 NOTE — LETTER
Letter by Aaron Ewing MD at      Author: Aaron Ewing MD Service: -- Author Type: --    Filed:  Encounter Date: 9/11/2020 Status: (Other)         September 11, 2020     Patient: Deborah Swartz   YOB: 1995   Date of Visit: 9/11/2020       To Whom It May Concern:    It is my medical opinion that Deborah Swartz should avoid lifting from September 11, 2020 through September 18, 2020      Sincerely,        Electronically signed by Aaron Ewing MD

## 2021-06-20 NOTE — LETTER
Letter by Taty Arnett MD at      Author: Taty Arnett MD Service: -- Author Type: --    Filed:  Encounter Date: 7/14/2020 Status: (Other)         July 14, 2020     Patient: Deborah Swartz   YOB: 1995   Date of Visit: 7/14/2020       To Whom It May Concern:    It is my medical opinion that Deborah Swartz should remain out of work until at least 7/28/2020 for a work related shoulder injury.  She will be reassessed prior to this date for reevaluation and for determination or her workability..    If you have any questions or concerns, please don't hesitate to call.    Sincerely,        Electronically signed by Taty Arnett MD

## 2021-06-20 NOTE — LETTER
Letter by Taty Arnett MD at      Author: Taty Arnett MD Service: -- Author Type: --    Filed:  Encounter Date: 7/14/2020 Status: (Other)         July 14, 2020     Patient: Deborah Swartz   YOB: 1995   Date of Visit: 7/14/2020       To Whom it May Concern:    Deborah Swartz was seen in my clinic on 7/14/2020 for a shoulder injury.  She should remain out of work for 2 weeks.  She will be reevaluated in two weeks at which point her workability will be reassessed.    If you have any questions or concerns, please don't hesitate to call.    Sincerely,         Electronically signed by Taty Arnett MD

## 2021-06-20 NOTE — LETTER
Letter by Uli Yeung MD at      Author: Uli Yeung MD Service: -- Author Type: --    Filed:  Encounter Date: 9/18/2020 Status: (Other)         September 18, 2020     Patient: Deborah Swartz   YOB: 1995   Date of Visit: 9/18/2020       To Whom it May Concern:    Deborah Swartz was seen in my clinic on 9/18/2020.    If you have any questions or concerns, please don't hesitate to call.    Sincerely,         Electronically signed by Uli Yeung MD

## 2021-06-20 NOTE — LETTER
Letter by Uli Yeung MD at      Author: Uli Yeung MD Service: -- Author Type: --    Filed:  Encounter Date: 7/27/2020 Status: (Other)         July 27, 2020     Patient: Deborah Swartz   YOB: 1995   Date of Visit: 7/27/2020       To Whom it May Concern:    Deborah Swartz was seen in my clinic on 7/27/2020.    Deborah Swartz was seen in my clinic on 7/27/2020  for a shoulder pain.  She should remain out of work for 2 weeks.  She will be reevaluated in two weeks at which point her workability will be reassessed.    If you have any questions or concerns, please don't hesitate to call.    Sincerely,         Electronically signed by Uli Yeung MD

## 2021-06-21 NOTE — LETTER
Letter by Uli Yeung MD at      Author: Uli Yeung MD Service: -- Author Type: --    Filed:  Encounter Date: 10/14/2020 Status: (Other)         October 14, 2020     Patient: Deborah Swartz   YOB: 1995   Date of Visit: 10/14/2020       To Whom it May Concern:    Deborah Swartz was seen in my clinic on 10/9/2020. She is recommended a 6 weeks course of Physical Therapy to help with improving her shoulder.  Her work absence is extended to 11/9/20, until which she will follow up in office for reassessment.     If you have any questions or concerns, please don't hesitate to call.    Sincerely,         Electronically signed by Uli Yeung MD

## 2021-06-21 NOTE — LETTER
Letter by Uli Yeung MD at      Author: Uli Yeung MD Service: -- Author Type: --    Filed:  Encounter Date: 1/11/2021 Status: (Other)       We will follow-up to the results of the study and manage accordingly.     January 11, 2021     Patient: Deborah Swartz   YOB: 1995   Date of Visit: 1/11/2021       To Whom it May Concern:    Deborah Swartz was seen in my clinic on 1/11/2021. She may return to work on 1/13/21 with weight lifting restriction of 10 lbs for the next 2 weeks as of the above date.     If you have any questions or concerns, please don't hesitate to call.    Sincerely,         Electronically signed by Uli Yeung MD

## 2021-06-26 NOTE — PROGRESS NOTES
EPDS was done in clinic today during child's 4 month visit and score was 10 with negative psychosis and negative screen for suicidal ideation.   Deborah has history of depression treated with Zoloft.   Copy of EPDS was given to Deborah today as well as educational material about postpartum depression.     Plan:   Follow up within 1-2 weeks with OB/primary care provider was recommended.  Behavioral health referral was not placed.

## 2021-06-26 NOTE — PROGRESS NOTES
[facility] Daily Progress     Patient Name: Deborah Swartz  Date: 6/8/2021  Visit #: 14  PTA visit #:  NA  Referral Diagnosis: Right shoulder injury, subsequent encounter  Referring provider: Uli Yeung, *  Visit Diagnosis:     ICD-10-CM    1. Right shoulder pain, unspecified chronicity  M25.511      Assessment from Initial Evaluation:  Deborah Swartz is a 25 y.o. female who presents to therapy today with chief complaints of (R) shoulder and arm pain. Onset date of sx was 6/28/20, work related.  Functional impairments include reaching in all directions, lifting, working, sleeping, personal cares, grooming, lying on (R) side.  Clinical findings include decreased shoulder A/PROM, mild pain w,ith resisted testing, mild pain with provocation tests, tenderness of sup shoulder, ant GH joint and ant humeral head.     Precautions / Restrictions : 12 weeks pregnant    Assessment:     HEP/POC compliance is  good .  The patient returns to PT since February.  She stopped PT temporarily while on maternity leave, which was the plan all along.  She did get TP injections which have helped significantly with her pain.  She demonstrates continued muscle weakness in her R shoulder and UE.  She is appropriate to continue with skilled PT services at this time in order to increase muscle strength for returning to work without restrictions.    Goal Status:  Pt. will demonstrate/verbalize independence in self-management of condition in : 6 weeks  Pt. will be independent with home exercise program in : 6 weeks  Pt. will have improved quality of sleep: getting 75-90% of required amount;in 4 weeks;Partially met  Patient will work: without restrictions;in 6 weeks  Patient will reach / maintain arm movement: overhead;behind;with full ROM;with no pain;in 6 weeks;Progressing toward  Patient will perform repetitive movement in : arm;for work;with no pain;in 6 weeks;Progressing toward  No data recorded    Plan / Patient  Education:     Progress with home program as tolerated.  Progress strengthening as able for return to work without restrictions.    Subjective:     Pain Ratin-2  The patient reports that she got 12 trigger point shots in the top of her shoulder on .  Her pain is improved since that time.  She was able to lift some boxes and it went well.  The shoulder hasn't really bothered her, but sometimes she will get muscle spasms.  She denies any numbness/tingling in her R UE.    Objective:     Shoulder/Elbow ROM  Date: 21     Shoulder and Elbow ROM ( )   AROM in degrees AROM in degrees AROM in degrees    Right Left Right Left Right Left   Shoulder Flexion (0-180 ) 156 with mm spasm and pain in lat        Shoulder Abduction (0-180 ) 170        Shoulder Extension (0-60 )         Shoulder ER (0-90 ) 94        Shoulder IR (0-70 ) 57        Shoulder IR/EXT         Elbow Flexion (150 )         Elbow Extension (0 )          PROM in degrees PROM in degrees PROM in degrees    Right Left Right Left Right Left   Shoulder Flexion (0-180 )         Shoulder Abduction (0-180 )         Shoulder Extension (0-60 )         Shoulder ER (0-90 )         Shoulder IR (0-70 )         Elbow Flexion (150 )         Elbow Extension (0 )             Shoulder/Elbow Strength  Date: 21     Shoulder/Elbow Strength (/5)  Manual Muscle Test (MMT) MMT MMT MMT    Right Left Right Left Right Left   Shoulder Flexion 5 5       Supraspinatus         Shoulder Abduction 4+ 5       Shoulder Extension         Shoulder External Rotation 4+ 5       Shoulder Internal Rotation 5 5       Elbow Flexion 5 5       Elbow Extension 5 5       Other:         Other:              strength:  L: 68, 57, 65#  R: 71, 65, 68#    Exercise #1: Scap retraction  Comment #1: HEP  Exercise #2: pec stretch over towel roll  Comment #2: HEP  Exercise #3: rows  Comment #3: L3 band HEP; bent over with 1# weight  Exercise #4: Shoulder extension with band  Comment #4: L1  "HEP  Exercise #5: Shoulder band exercises  Comment #5: L2 band ER, L1 band IR HEP  Exercise #6: Bicep curls  Comment #6: Palm up and thumb up L3 HEP; palm down L3 HEP  Exercise #7: Shoulder flexion strengthening  Comment #7: 1# weight HEP  Exercise #8: Wall pushups  Comment #8: HEP  Exercise #9: Chair pushups  Comment #9: HEP  Exercise #10: Shoulder stabilization  Comment #10: Ball on wall up/down, side to side, CW/CCW with shoulder protraction HEP  Exercise #11: Wrist strengthening  Comment #11: 1# weight x 10 extension and flexion  Exercise #12: Shoulder abduction  Comment #12: x 10 with 1# weight  Exercise #13: Tricep kickbacks  Comment #13: 1# weight with 3\" hold    Appt time: 12:31PM - 1:27PM    Treatment Today     TREATMENT MINUTES COMMENTS   Evaluation     Self-care/ Home management     Manual therapy     Neuromuscular Re-education     Therapeutic Activity     Therapeutic Exercises 56 -Subjective measures taken  -Objective measures taken  -See flow sheet; added wrist and hand strengthening d/t weakness patient has noticed with dropping objects she is holding  -Patient to progress shoulder strengthening with increased resistance bands   Gait training     Modality__________________                Total 56    Blank areas are intentional and mean the treatment did not include these items.       Alley Santiago, PT, DPT  6/8/2021  "

## 2021-06-28 ENCOUNTER — MYC MEDICAL ADVICE (OUTPATIENT)
Dept: ORTHOPEDICS | Facility: CLINIC | Age: 26
End: 2021-06-28

## 2021-06-28 NOTE — LETTER
July 13, 2021      Deborah is being followed in my clinic for her right shoulder injury that occurred at work on June 26, 2020.  She has been evaluated and treated with Tylenol,  physical therapy, steroid injection around the rotator cuff, as well as work restrictions.  She continues to have signs of improving rotator cuff tendinitis/bursitis. Her ongoing pain in her biceps tendon is still improved today after injection as well.  She may return to work at this time with no formal restrictions.  She will follow up with me in ~ 3 weeks (on or about 8/1/21) to check her progress.  Patient to continue physical therapy.  Updated recommendations will be provided at her next visit, sooner if needed based on her progress.      Garrett Zambrano, , CAQ  Primary Care Sports Medicine  [facility]

## 2021-06-28 NOTE — LETTER
June 28, 2021      Deborah Swartz  [address]  [place] [address]        To Whom It May Concern,      Deborah continues to be under my care for her right shoulder work compensation injury.  She may return to work beginning 6/29/21 with the following restrictions.    1.  Provide  15 min break every  2 hours as needed for icing and shoulder stretching.  2.  Maximum 10 hour shift.  3.  Lift/carry restriction of 25lbs  4.  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  5.  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  May not work - slam and multi packing stations.    These restrictions will remain in place until her follow up appointment approximately on 8/1/2021.  Please contact my clinic with any questions or concerns.      Garrett Zambrano DO, CALENORE  Sports Medicine Physician  [facility]

## 2021-06-28 NOTE — TELEPHONE ENCOUNTER
Updated letter pended.  [employer] message was sent back to patient to clarify, prior to sending to Dr Dixon to advise.    Slick Nunn ATC

## 2021-06-29 NOTE — PROGRESS NOTES
Progress Notes by Uli Yeung MD at 9/18/2020 10:15 AM     Author: Uli Yeung MD Service: -- Author Type: Physician    Filed: 10/1/2020  8:29 AM Encounter Date: 9/18/2020 Status: Signed    : Uli Yeugn MD (Physician)       Assessment/Plan:        1. Acute pain of right shoulder  Exam findings were discussed   At this point, amid her pregnancy, only tylenol for pain management is recommended and shoulder exercise for treatment of impingement was reviewed.  She may consider continuing Physical Therapy            Medication List          Accurate as of September 18, 2020 11:59 PM. If you have any questions, ask your nurse or doctor.            CONTINUE taking these medications    cholecalciferol (vitamin D3) 1,250 mcg (50,000 unit) capsule        prenatal vit no.130-iron-folic 27 mg iron- 800 mcg Tab tablet  Also known as: PRENATAL VITAMIN  INSTRUCTIONS: Take 1 tablet by mouth daily.        sertraline 25 MG tablet  Also known as: ZOLOFT  INSTRUCTIONS: Take 1 tablet (25 mg total) by mouth daily.               At the conclusion of the encounter the plan of care, disposition and all questions were answered and reviewed, and the patient acknowledged understanding and was involved in the decision making regarding the overall care plan.           Subjective:    Patient ID:   Deborah Swartz is a 25 y.o.Pregnant female presenting in follow up of right shoulder pain, due to an injury back in June 2020 when she was  lifting a heavy object while reaching out. She did the cortisone shot and treated with Physical Therapy with some improvement, but again, reaggravating it when she was lifting her body up by extending her shoulder behind her.  She has also felt a popping sensation when she moves it.  There is no neck pain,  numbness or weakness of the right arm.        Review of Systems  Allergy: reviewed  General : negative  A complete 5 point review of systems was obtained and is negative  "other than what is stated in the HPI.      The following patient's history were reviewed and updated as appropriate:   She  has no past medical history on file..      Outpatient Encounter Medications as of 9/18/2020   Medication Sig Dispense Refill   ? cholecalciferol, vitamin D3, 1,250 mcg (50,000 unit) capsule      ? prenatal vit no.130-iron-folic (PRENATAL VITAMIN) 27 mg iron- 800 mcg Tab tablet Take 1 tablet by mouth daily. 90 tablet 3   ? sertraline (ZOLOFT) 25 MG tablet Take 1 tablet (25 mg total) by mouth daily. 30 tablet 0     No facility-administered encounter medications on file as of 9/18/2020.          Objective:   /74 (Patient Site: Right Arm, Patient Position: Sitting, Cuff Size: Adult Regular)   Pulse 92   Temp 96.6  F (35.9  C) (Tympanic)   Resp 18   Ht 5' 3\" (1.6 m)   Wt 171 lb 1.6 oz (77.6 kg)   LMP 01/29/2020 (Approximate)   SpO2 98%   Breastfeeding No   BMI 30.31 kg/m        Physical Exam  General: NAD, well hydrated   Right shoulder: normal to inspection, no swelling or erythema. + impingement test. No dislocation.        "

## 2021-07-02 NOTE — TELEPHONE ENCOUNTER
LVM for return call to discuss patient's work restrictions and what is being requested for patient to return work.  Will await return phone call.    Slick Nunn, ATC

## 2021-07-20 ENCOUNTER — HOSPITAL ENCOUNTER (OUTPATIENT)
Dept: PHYSICAL THERAPY | Facility: REHABILITATION | Age: 26
End: 2021-07-20
Payer: OTHER MISCELLANEOUS

## 2021-07-20 DIAGNOSIS — M25.511 RIGHT SHOULDER PAIN, UNSPECIFIED CHRONICITY: Primary | ICD-10-CM

## 2021-07-20 PROCEDURE — 97110 THERAPEUTIC EXERCISES: CPT | Mod: GP | Performed by: PHYSICAL THERAPIST

## 2021-07-20 NOTE — PROGRESS NOTES
[facility] Daily Progress Note    Patient Name: Deborah Swartz  Date: 2021  Visit #: 17  PTA visit #:  NA  Referral Diagnosis: Right shoulder injury, subsequent encounter  Referring provider: Uli Yeung, *  Visit Diagnosis:       ICD-10-CM     1. Right shoulder pain, unspecified chronicity  M25.511       Assessment from Initial Evaluation:  Deborah Swartz is a 25 y.o. female who presents to therapy today with chief complaints of (R) shoulder and arm pain. Onset date of sx was 20, work related.  Functional impairments include reaching in all directions, lifting, working, sleeping, personal cares, grooming, lying on (R) side.  Clinical findings include decreased shoulder A/PROM, mild pain w,ith resisted testing, mild pain with provocation tests, tenderness of sup shoulder, ant GH joint and ant humeral head.     Assessment:     HEP/POC compliance is  good .  The patient presents to PT for follow up visit.  She demonstrates weakness in her R pec major mm and has been having pain in this area as well.  She was given strengthening for this.  Patient has returned to work as of this week, but has been having soreness from not being as active since her injury.  She has been able to improve her performance at work the last two shifts she completed and is progressively improving in strength and function.  She is appropriate to continue with skilled PT services at this time and will be ready to be discharged soon as long as pain continues to improve.     Plan / Patient Education:     Continue with initial plan of care.  Progress with home program as tolerated.  Finalize HEP.  Consider DC next session if patient is doing well.    Subjective:     Pain Ratin-2  The patient reports that she went back to work on  full time.  She left work early yesterday because her body was really sore from all the new physical activity she's doing.  She discovered that there is a muscle in the  front of her shoulder that has been hurting a bit (max of 4/10).  She is feeling overall weak, but is getting better.  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  All of the stations are R hand dominant which has been hard.  The muscles that have been treated with cortisone have been doing well.    Objective:     Shoulder ROM appears WNL.  Difficulty with shoulder IR/Ext.  Pec major strength 4+/5 R.    Date July 20, 2021   Exercise    Scap retraction HEP   standing pec stretch against wall and in doorway HEP   rows Bent over with 1# weight   Shoulder extension with band L1 HEP   D1 and D2 flexion and extension L4 HEP   Bicep curls Palm up, palm down, and thumb up L3   Wall pushups Changed to regular knee pushups   Chair pushups HEP   Shoulder stabilization Ball on wall up/down, side to side, CW/CCW with shoulder protraction HEP   Wrist strengthening 1# weight HEP   Tricep kickbacks 1# weight HEP   Sleeper stretch 30 seconds; cues for correct positioning   Side plank 20 seconds   Flys 1# weight x 10; also did seated shoulder horizontal abduction x 10 with L2 band   Push up On knees x 5   Plank stabilization Discussed full plank position for shoulder stabilization         Appt time: 1:32PM - 2:02PM    Treatment Today     TREATMENT MINUTES COMMENTS   Evaluation     Self-care/ Home management     Manual therapy     Neuromuscular Re-education     Therapeutic Activity     Therapeutic Exercises 30 -Subjective measures taken  -Added flys, pushups from knee position, and plank stabilization to HEP for shoulder strength and stability  -Review of HEP  -Discussed POC   Gait training     Modality__________________                Total 30    Blank areas are intentional and mean the treatment did not include these items.       Alley Santiago, PT, DPT, MHA  July 20, 2021

## 2021-07-27 ENCOUNTER — OFFICE VISIT (OUTPATIENT)
Dept: ORTHOPEDICS | Facility: CLINIC | Age: 26
End: 2021-07-27
Payer: OTHER MISCELLANEOUS

## 2021-07-27 VITALS
WEIGHT: 196 LBS | SYSTOLIC BLOOD PRESSURE: 111 MMHG | BODY MASS INDEX: 34.73 KG/M2 | HEIGHT: 63 IN | HEART RATE: 91 BPM | DIASTOLIC BLOOD PRESSURE: 70 MMHG

## 2021-07-27 DIAGNOSIS — M75.21 BICEPS TENDONITIS ON RIGHT: ICD-10-CM

## 2021-07-27 DIAGNOSIS — M75.51 SUBACROMIAL BURSITIS OF RIGHT SHOULDER JOINT: ICD-10-CM
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
## 2021-07-27 PROCEDURE — 99213 OFFICE O/P EST LOW 20 MIN: CPT | Performed by: FAMILY MEDICINE

## 2021-07-27 ASSESSMENT — PAIN SCALES - GENERAL: PAINLEVEL: MILD PAIN (3)

## 2021-07-27 ASSESSMENT — MIFFLIN-ST. JEOR: SCORE: 1598.18

## 2021-07-27 NOTE — LETTER
2021         RE: Deborah Swartz  [address]  [place] [address]        Dear Colleague,    Thank you for referring your patient, Deborah Swartz, to the [facility] [place]. Please see a copy of my visit note below.    Deborah Swartz  :  1995  DOS: 21  MRN: [id number]    Sports Medicine Clinic Visit    PCP: No primary care provider on file.    Deborah Swartz is a 25 year old Right hand dominant female who is seen in consultation at the request of  Aaron Ewing M.D. presenting with right shoulder injury.    Interim History - Sharon 10, 2021  Since last visit on 2021 patient has minimal right shoulder pain.  She reports mild discomfort over superior shoulder/AC joint with reaching overhead.  Patient is requesting updated work restrictions today - wanting to return most job tasks.  No interim injury.       Interim History - 2021  Since last visit on 6/10/2021 patient has been feeling better. Trial of working without restrictions has been going really well. Has been working on packing at work and she notes that she is feeling better with working then without work. Has been doing in-office and HEP PT. Ibuprofen, stretching and taking an ice break at work has been helping. Driving hurts her right shoulder the most, specifically when she steers counterclockwise and internal shoulder rotation. No interim injury.       Review of Systems  Musculoskeletal: as above  Remainder of review of systems is negative including constitutional, CV, pulmonary, GI, Skin and Neurologic except as noted in HPI or medical history.    Past Medical History:   Diagnosis Date     Asthma      Mental disorder      Papanicolaou smear of cervix with atypical squamous cells of undetermined significance (ASC-US) 16     Postpartum depression      Past Surgical History:   Procedure Laterality Date      SECTION        SECTION N/A 2021    Procedure:   "SECTION;  Surgeon: Del Saini MD;  Location: [facility] OR;  Service: Obstetrics     Family History   Problem Relation Age of Onset     Hypertension Mother      GERD Mother      Congenital Anomalies Mother         Spinal Bifada     Anxiety Disorder Mother      Depression Father      Diabetes Paternal Grandmother      Diabetes Mother      Objective  /70 (BP Location: Right arm, Patient Position: Sitting, Cuff Size: Adult Regular)   Pulse 91   Ht 1.6 m (5' 3\")   Wt 88.9 kg (196 lb)   BMI 34.72 kg/m        General: healthy, alert and in no distress      HEENT: no scleral icterus or conjunctival erythema     Skin: no suspicious lesions or rash. No jaundice.     CV: regular rhythm by palpation, 2+ distal pulses, no pedal edema      Resp: normal respiratory effort without conversational dyspnea     Psych: normal mood and affect      Gait: nonantalgic, appropriate coordination and balance     Neuro: normal light touch sensory exam of the extremities. Motor strength as noted below     Right Shoulder exam    ROM:        Full active and passive ROM with flexion, extension, abduction, internal and external rotation    Tender:        subacromial space right, mild       Lateral deltoid resolved       GH joint anteriorly minimal       Long head of biceps mild    Non Tender:       remainder of shoulder       acromioclavicular joint       periscapular region    Strength:        abduction 5-/5       internal rotation 5/5       external rotation 5/5       adduction 5/5    Impingement testing:       negative Neer       neg Gastelum       positive (+) empty can, very mild       neg (-) crossover       neg (-) crank    Stability testing:       neg (-) anterior glide       neg (-) sulcus sign    Skin:       no visible deformities       well perfused       capillary refill brisk    Sensation:        normal sensation over shoulder and upper extremity     Radiology  XR SHOULDER RIGHT G/E 3 VIEWS 5/13/2021 1:48 PM " "     HISTORY: Work related injury; Pain in joint of right shoulder                                                               IMPRESSION: Negative exam.      Assessment:  1. Work related injury    2. Subacromial bursitis of right shoulder joint    3. Biceps tendonitis on right        Plan:  Discussed the assessment with the patient.  Follow up: 8 weeks  Still significantly improved overall, ongoing mild biceps tendon and RTC tendinitis sx  Reviewed potential for MRI for worsening sx, continue to defer for now  Letter updated for work:  \"Deborah is being followed in my clinic for her right shoulder injury that occurred at work on June 26, 2020.  She has been evaluated and treated with Tylenol,  physical therapy, steroid injection around the rotator cuff and biceps tendon sheath, as well as work restrictions.  She continues to have signs of improving rotator cuff tendinitis/bursitis and is continuing to work with physical therapy, which is very important to her ongoing recovery.  She may continue to work at this time with no formal restrictions, but should be allowed to take a 15 minute break to ice her shoulder up to twice per day if needed.    She will follow up with me in 8 weeks to monitor her progress.  Patient to continue physical therapy as approved.  Updated recommendations will be provided at her next visit, sooner if needed based on her progress.\"  US guided subacromial bursa and biceps tendon sheath CSI helpful, prefer to avoid repeat if possible  TPI for muscle pain and spasm was helpful, minimal recurrence  Consider MRI of the shoulder if pain increases again  PT options and strategies reviewed  Supportive care reviewed  All questions were answered today  Contact us with additional questions or concerns  Signs and sx of concern reviewed      Garrett Dixon DO, EB  Primary Care Sports Medicine  [facility]           Disclaimer: This note consists of symbols derived from keyboarding, " dictation and/or voice recognition software. As a result, there may be errors in the script that have gone undetected. Please consider this when interpreting information found in this chart.      Again, thank you for allowing me to participate in the care of your patient.        Sincerely,        Garrett Dixon, DO

## 2021-07-27 NOTE — LETTER
July 27, 2021      Deborah is being followed in my clinic for her right shoulder injury that occurred at work on June 26, 2020.  She has been evaluated and treated with Tylenol,  physical therapy, steroid injection around the rotator cuff and biceps tendon sheath, as well as work restrictions.  She continues to have signs of improving rotator cuff tendinitis/bursitis and is continuing to work with physical therapy, which is very important to her ongoing recovery.  She may continue to work at this time with no formal restrictions, but should be allowed to take a 15 minute break to ice her shoulder up to twice per day if needed.    She will follow up with me in 8 weeks to monitor her progress.  Patient to continue physical therapy as approved.  Updated recommendations will be provided at her next visit, sooner if needed based on her progress.      Garrett Zambrano DO, CAQ  Primary Care Sports Medicine  [facility]

## 2021-07-27 NOTE — PROGRESS NOTES
Deborah Swartz  :  1995  DOS: 21  MRN: [id number]    Sports Medicine Clinic Visit    PCP: No primary care provider on file.    Deborah Swartz is a 25 year old Right hand dominant female who is seen in consultation at the request of  Aaron Ewing M.D. presenting with right shoulder injury.    Interim History - Sharon 10, 2021  Since last visit on 2021 patient has minimal right shoulder pain.  She reports mild discomfort over superior shoulder/AC joint with reaching overhead.  Patient is requesting updated work restrictions today - wanting to return most job tasks.  No interim injury.       Interim History - 2021  Since last visit on 6/10/2021 patient has been feeling better. Trial of working without restrictions has been going really well. Has been working on packing at work and she notes that she is feeling better with working then without work. Has been doing in-office and HEP PT. Ibuprofen, stretching and taking an ice break at work has been helping. Driving hurts her right shoulder the most, specifically when she steers counterclockwise and internal shoulder rotation. No interim injury.       Review of Systems  Musculoskeletal: as above  Remainder of review of systems is negative including constitutional, CV, pulmonary, GI, Skin and Neurologic except as noted in HPI or medical history.    Past Medical History:   Diagnosis Date     Asthma      Mental disorder      Papanicolaou smear of cervix with atypical squamous cells of undetermined significance (ASC-US) 16     Postpartum depression      Past Surgical History:   Procedure Laterality Date      SECTION        SECTION N/A 2021    Procedure:  SECTION;  Surgeon: Del Saini MD;  Location: [facility] L+D OR;  Service: Obstetrics     Family History   Problem Relation Age of Onset     Hypertension Mother      GERD Mother      Congenital Anomalies Mother         Spinal Bifada     Anxiety Disorder  "Mother      Depression Father      Diabetes Paternal Grandmother      Diabetes Mother      Objective  /70 (BP Location: Right arm, Patient Position: Sitting, Cuff Size: Adult Regular)   Pulse 91   Ht 1.6 m (5' 3\")   Wt 88.9 kg (196 lb)   BMI 34.72 kg/m        General: healthy, alert and in no distress      HEENT: no scleral icterus or conjunctival erythema     Skin: no suspicious lesions or rash. No jaundice.     CV: regular rhythm by palpation, 2+ distal pulses, no pedal edema      Resp: normal respiratory effort without conversational dyspnea     Psych: normal mood and affect      Gait: nonantalgic, appropriate coordination and balance     Neuro: normal light touch sensory exam of the extremities. Motor strength as noted below     Right Shoulder exam    ROM:        Full active and passive ROM with flexion, extension, abduction, internal and external rotation    Tender:        subacromial space right, mild       Lateral deltoid resolved       GH joint anteriorly minimal       Long head of biceps mild    Non Tender:       remainder of shoulder       acromioclavicular joint       periscapular region    Strength:        abduction 5-/5       internal rotation 5/5       external rotation 5/5       adduction 5/5    Impingement testing:       negative Neer       neg Gastelum       positive (+) empty can, very mild       neg (-) crossover       neg (-) crank    Stability testing:       neg (-) anterior glide       neg (-) sulcus sign    Skin:       no visible deformities       well perfused       capillary refill brisk    Sensation:        normal sensation over shoulder and upper extremity     Radiology  XR SHOULDER RIGHT G/E 3 VIEWS 5/13/2021 1:48 PM      HISTORY: Work related injury; Pain in joint of right shoulder                                                               IMPRESSION: Negative exam.      Assessment:  1. Work related injury    2. Subacromial bursitis of right shoulder joint    3. Biceps " "tendonitis on right        Plan:  Discussed the assessment with the patient.  Follow up: 8 weeks  Still significantly improved overall, ongoing mild biceps tendon and RTC tendinitis sx  Reviewed potential for MRI for worsening sx, continue to defer for now  Letter updated for work:  \"Deborah is being followed in my clinic for her right shoulder injury that occurred at work on June 26, 2020.  She has been evaluated and treated with Tylenol,  physical therapy, steroid injection around the rotator cuff and biceps tendon sheath, as well as work restrictions.  She continues to have signs of improving rotator cuff tendinitis/bursitis and is continuing to work with physical therapy, which is very important to her ongoing recovery.  She may continue to work at this time with no formal restrictions, but should be allowed to take a 15 minute break to ice her shoulder up to twice per day if needed.    She will follow up with me in 8 weeks to monitor her progress.  Patient to continue physical therapy as approved.  Updated recommendations will be provided at her next visit, sooner if needed based on her progress.\"  US guided subacromial bursa and biceps tendon sheath CSI helpful, prefer to avoid repeat if possible  TPI for muscle pain and spasm was helpful, minimal recurrence  Consider MRI of the shoulder if pain increases again  PT options and strategies reviewed  Supportive care reviewed  All questions were answered today  Contact us with additional questions or concerns  Signs and sx of concern reviewed      Garrett Dixon DO, EB  Primary Care Sports Medicine  [facility]           Disclaimer: This note consists of symbols derived from keyboarding, dictation and/or voice recognition software. As a result, there may be errors in the script that have gone undetected. Please consider this when interpreting information found in this chart.  "

## 2021-07-30 ENCOUNTER — TELEPHONE (OUTPATIENT)
Dept: ORTHOPEDICS | Facility: CLINIC | Age: 26
End: 2021-07-30

## 2021-07-30 NOTE — TELEPHONE ENCOUNTER
Reason for Call:  Form, our goal is to have forms completed with 7 days, however, some forms may require a visit or additional information.    Type of letter, form or note:  work comp     Who is the form from?: Insurance    Where did the form come from: form was faxed in    Requesting OV notes and updated workability in regards to patient's appointment on 7/27/21.  Information requested was faxed back to Work Comp as requested.  They will contact clinic with further questions or concerns.    Slick Nunn ATC

## 2021-08-20 ENCOUNTER — IMMUNIZATION (OUTPATIENT)
Dept: NURSING | Facility: CLINIC | Age: 26
End: 2021-08-20
Payer: COMMERCIAL

## 2021-08-20 PROCEDURE — 0001A PR COVID VAC PFIZER DIL RECON 30 MCG/0.3 ML IM: CPT

## 2021-08-20 PROCEDURE — 91300 PR COVID VAC PFIZER DIL RECON 30 MCG/0.3 ML IM: CPT

## 2021-08-25 ENCOUNTER — HOSPITAL ENCOUNTER (OUTPATIENT)
Dept: PHYSICAL THERAPY | Facility: REHABILITATION | Age: 26
End: 2021-08-25
Payer: OTHER MISCELLANEOUS

## 2021-08-25 DIAGNOSIS — M25.511 RIGHT SHOULDER PAIN, UNSPECIFIED CHRONICITY: Primary | ICD-10-CM

## 2021-08-25 PROCEDURE — 97110 THERAPEUTIC EXERCISES: CPT | Mod: GP | Performed by: PHYSICAL THERAPIST

## 2021-08-25 NOTE — PROGRESS NOTES
Date July 20, 2021 8/25/2021   Exercise     Scap retraction HEP HEP   standing pec stretch against wall and in doorway HEP HEP   rows Bent over with 1# weight Bent over with 1# weight HEP   Shoulder extension with band L1 HEP L1 HEP   D1 and D2 flexion and extension L4 HEP L4 HEP   Bicep curls Palm up, palm down, and thumb up L3 Palm up, palm down, and thumb up L3   Wall pushups Changed to regular knee pushups Regular pushups x 2, discussed continuing regular knee pushups   Chair pushups HEP HEP   Shoulder stabilization Ball on wall up/down, side to side, CW/CCW with shoulder protraction HEP Ball on wall up/down, side to side, CW/CCW with shoulder protraction HEP   Wrist strengthening 1# weight HEP 1# weight HEP   Tricep kickbacks 1# weight HEP 1# weight HEP   Sleeper stretch 30 seconds; cues for correct positioning HEP   Side plank 20 seconds HEP   Flys 1# weight x 10; also did seated shoulder horizontal abduction x 10 with L2 band 5# weight x 10   Push up On knees x 5 Regular pushups x 2, discussed continuing regular knee pushups   Plank stabilization Discussed full plank position for shoulder stabilization Full plank position          Alley Santiago, PT, DPT, MHA  8/25/2021

## 2021-09-10 ENCOUNTER — IMMUNIZATION (OUTPATIENT)
Dept: NURSING | Facility: CLINIC | Age: 26
End: 2021-09-10
Attending: INTERNAL MEDICINE
Payer: COMMERCIAL

## 2021-09-10 PROCEDURE — 0002A PR COVID VAC PFIZER DIL RECON 30 MCG/0.3 ML IM: CPT

## 2021-09-10 PROCEDURE — 91300 PR COVID VAC PFIZER DIL RECON 30 MCG/0.3 ML IM: CPT

## 2021-09-18 ENCOUNTER — HEALTH MAINTENANCE LETTER (OUTPATIENT)
Age: 26
End: 2021-09-18

## 2021-12-08 NOTE — PROGRESS NOTES
[facility]    Outpatient Physical Therapy Discharge Note  Patient: Deborah Swartz  : 1995    Beginning/End Dates of Reporting Period:  2020 to 2021    Referring Provider: Uli Yeung MD    Therapy Diagnosis:   1. Right shoulder pain, unspecified chronicity       Client Self Report: The patient reports that work has been going ok.  She just started making minimum rate at work for a few hours.  She had one day so far that pain was 9/10.  She rested, iced, and took ibuprofen and it was fine after that.  She is incorporating her exercises into work.    Objective Measurements:  Objective Measure: Shoulder ROM  Details: WNL; pain with shoulder IR/ext R  Objective Measure: Palpation  Details: Tenderness R pec major mm  Objective Measure: Strength  Details: 4+/5 pec major R    Goals:  Goal Identifier Self-management   Goal Description Pt. will demonstrate/verbalize independence in self-management of condition.   Target Date 21   Date Met  21   Progress (detail required for progress note): Met     Goal Identifier HEP   Goal Description Pt. will be independent with home exercise program.   Target Date 21   Date Met  21   Progress (detail required for progress note): Met     Goal Identifier Sleeping   Goal Description Pt. will have improved quality of sleep: getting 75-90% of required amount.   Target Date 21   Date Met  21   Progress (detail required for progress note): Met     Goal Identifier Work   Goal Description Patient will work: without restrictions.   Target Date 21   Date Met      Progress (detail required for progress note): Almost Met     Goal Identifier Reaching   Goal Description Patient will reach / maintain arm movement: overhead;behind;with full ROM;with no pain.   Target Date 21   Date Met      Progress (detail required for progress  note): Almost Met     Goal Identifier Repetitive movements   Goal Description Patient will perform repetitive movement in : arm;for work;with no pain.   Target Date 08/20/21   Date Met  08/25/21   Progress (detail required for progress note): Met     Assessment:  The patient presents to PT for follow up visit.  She demonstrates progressively improving strength and is able to perform more work duties with some rest breaks.  She is appropriate to follow up in 1 month to determine progress and POC.    Plan:  Discharge from therapy.    Discharge:    Reason for Discharge: Patient overall doing well.  She did not return after session on 8/25/21 and is appropriate for discharge at this time.    Equipment Issued: Resistance band    Discharge Plan: Patient to continue home program.    Alley Santiago, PT, DPT, MHA  12/8/2021

## 2021-12-08 NOTE — ADDENDUM NOTE
Encounter addended by: Alley Santiago, PT on: 12/8/2021 8:36 AM   Actions taken: Clinical Note Signed, Episode resolved

## 2022-01-12 VITALS — HEIGHT: 63 IN | BODY MASS INDEX: 37.21 KG/M2 | WEIGHT: 210 LBS

## 2022-01-13 ENCOUNTER — OFFICE VISIT (OUTPATIENT)
Dept: ORTHOPEDICS | Facility: CLINIC | Age: 27
End: 2022-01-13
Payer: OTHER MISCELLANEOUS

## 2022-01-13 VITALS
DIASTOLIC BLOOD PRESSURE: 72 MMHG | SYSTOLIC BLOOD PRESSURE: 113 MMHG | WEIGHT: 179 LBS | HEIGHT: 63 IN | BODY MASS INDEX: 31.71 KG/M2

## 2022-01-13 DIAGNOSIS — M25.511 PAIN IN JOINT OF RIGHT SHOULDER: ICD-10-CM

## 2022-01-13 DIAGNOSIS — M75.51 SUBACROMIAL BURSITIS OF RIGHT SHOULDER JOINT: Primary | ICD-10-CM
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
## 2022-01-13 PROCEDURE — 99213 OFFICE O/P EST LOW 20 MIN: CPT | Performed by: FAMILY MEDICINE

## 2022-01-13 ASSESSMENT — MIFFLIN-ST. JEOR: SCORE: 1521.07

## 2022-01-13 NOTE — LETTER
2022         RE: Deborah Swartz  [address] [place] [address]        Dear Colleague,    Thank you for referring your patient, Deborah Swartz, to the [facility] [place]. Please see a copy of my visit note below.    Deborah Swartz  :  1995  DOS:22  MRN: [id number]    Sports Medicine Clinic Visit    PCP: No primary care provider on file.    Deborah Swartz is a 25 year old Right hand dominant female who is seen in consultation at the request of  Aaron Ewing M.D. presenting with right shoulder injury.    Interim History - Sharon 10, 2021  Since last visit on 2021 patient has minimal right shoulder pain.  She reports mild discomfort over superior shoulder/AC joint with reaching overhead.  Patient is requesting updated work restrictions today - wanting to return most job tasks.  No interim injury.       Interim History - 2021  Since last visit on 6/10/2021 patient has been feeling better. Trial of working without restrictions has been going really well. Has been working on packing at work and she notes that she is feeling better with working then without work. Has been doing in-office and HEP PT. Ibuprofen, stretching and taking an ice break at work has been helping. Driving hurts her right shoulder the most, specifically when she steers counterclockwise and internal shoulder rotation. No interim injury.       Interim History - 2022  Since last visit on 2021 patient has moderate-severe right deep anterior shoulder, glenohumeral joint pain.  She notes that shoulder pain during peak holiday season at her employer.  Currently wearing shoulder spica brace with activity with some relief.  Patient has not completed MRI as discussed.  No interim injury.       Review of Systems  Musculoskeletal: as above  Remainder of review of systems is negative including constitutional, CV, pulmonary, GI, Skin and Neurologic except as noted  "in HPI or medical history.    Past Medical History:   Diagnosis Date     Asthma      Mental disorder      Papanicolaou smear of cervix with atypical squamous cells of undetermined significance (ASC-US) 16     Postpartum depression      Past Surgical History:   Procedure Laterality Date      SECTION        SECTION N/A 2021    Procedure:  SECTION;  Surgeon: Del Saini MD;  Location: [facility];  Service: Obstetrics     Family History   Problem Relation Age of Onset     Hypertension Mother      GERD Mother      Congenital Anomalies Mother         Spinal Bifada     Anxiety Disorder Mother      Depression Father      Diabetes Paternal Grandmother      Diabetes Mother      Objective  /72   Ht 1.6 m (5' 3\")   Wt 81.2 kg (179 lb)   BMI 31.71 kg/m        General: healthy, alert and in no distress      HEENT: no scleral icterus or conjunctival erythema     Skin: no suspicious lesions or rash. No jaundice.     CV: regular rhythm by palpation, 2+ distal pulses, no pedal edema      Resp: normal respiratory effort without conversational dyspnea     Psych: normal mood and affect      Gait: nonantalgic, appropriate coordination and balance     Neuro: normal light touch sensory exam of the extremities. Motor strength as noted below     Right Shoulder exam    ROM:        Full active and passive ROM with flexion, extension, abduction, internal and external rotation, very mild pain on the right with terminal flexion, abduction, ER    Tender:        subacromial space right, mild       Lateral deltoid mild       GH joint anteriorly minimal       Long head of biceps minimal    Non Tender:       remainder of shoulder       acromioclavicular joint       periscapular region    Strength:        abduction 5-/5, painful       internal rotation 5/5, mild pain       external rotation 5/5, painful       adduction 5/5    Impingement testing:       Mildly painful Neer       lucia Gastelum       " "positive (+) empty can, mild       neg (-) crossover       neg (-) crank    Stability testing:       neg (-) anterior glide       neg (-) sulcus sign    Skin:       no visible deformities       well perfused       capillary refill brisk    Sensation:        normal sensation over shoulder and upper extremity     Radiology  XR SHOULDER RIGHT G/E 3 VIEWS 5/13/2021 1:48 PM      HISTORY: Work related injury; Pain in joint of right shoulder                                                               IMPRESSION: Negative exam.      Assessment:  1. Subacromial bursitis of right shoulder joint    2. Pain in joint of right shoulder    3. Work related injury        Plan:  Discussed the assessment with the patient.  Follow up: 8 weeks  Sx were improved overall, worsened again after increased work demand over the holiday season, now improving with decreasing demand back to baseline  Reviewed potential for MRI for worsening sx, was ordered and approved, encouraged her to follow through with this to help better understand any underlying pathology  Letter updated for work:  \"Deborah is being followed in my clinic for her right shoulder injury that occurred at work on June 26, 2020.  She has been evaluated and treated with Tylenol,  physical therapy, steroid injection around the rotator cuff and biceps tendon sheath, as well as work restrictions.  She continues to have signs of rotator cuff tendinitis/bursitis today after a flare of pain with increased demand over the recent holiday season.  She is continuing to work with physical therapy, and I recommended diligence with home exercises.  She may continue to work at this time with no formal restrictions, but should be allowed to take a 15 minute break to ice her shoulder up to twice per day if needed.    She will follow up with me again in 8 weeks to monitor her progress.  An MRI was approved to further assess shoulder pathology, and if the patient proceeds I will contact her and " "update any recommendations for work or changes to the treatment plan.  Updated recommendations will be provided at her next visit, sooner if needed based on her progress.\"  US guided subacromial bursa and biceps tendon sheath CSI were both helpful, prefer to avoid repeat if possible  PT/HEP options and strategies reviewed  Supportive care reviewed  All questions were answered today  Contact us with additional questions or concerns  Signs and sx of concern reviewed      Garrett Dixon DO, CAQ  Primary Care Sports Medicine  [facility]           Disclaimer: This note consists of symbols derived from keyboarding, dictation and/or voice recognition software. As a result, there may be errors in the script that have gone undetected. Please consider this when interpreting information found in this chart.      Again, thank you for allowing me to participate in the care of your patient.        Sincerely,        Garrett Dixon DO    "

## 2022-01-13 NOTE — LETTER
January 13, 2022      Deborah is being followed in my clinic for her right shoulder injury that occurred at work on June 26, 2020.  She has been evaluated and treated with Tylenol,  physical therapy, steroid injection around the rotator cuff and biceps tendon sheath, as well as work restrictions.  She continues to have signs of rotator cuff tendinitis/bursitis today after a flare of pain with increased demand over the recent holiday season.  She is continuing to work with physical therapy, and I recommended diligence with home exercises.  She may continue to work at this time with no formal restrictions, but should be allowed to take a 15 minute break to ice her shoulder up to twice per day if needed.    She will follow up with me again in 8 weeks to monitor her progress.  An MRI was approved to further assess shoulder pathology, and if the patient proceeds I will contact her and update any recommendations for work or changes to the treatment plan.  Updated recommendations will be provided at her next visit, sooner if needed based on her progress.      Garrett Zambrano, DO, CAQ  Primary Care Sports Medicine  [facility]

## 2022-01-13 NOTE — PROGRESS NOTES
Deborah Swartz  :  1995  DOS:22  MRN: [id number]    Sports Medicine Clinic Visit    PCP: No primary care provider on file.    Deborah Swartz is a 25 year old Right hand dominant female who is seen in consultation at the request of  Aaron Ewing M.D. presenting with right shoulder injury.    Interim History - Sharon 10, 2021  Since last visit on 2021 patient has minimal right shoulder pain.  She reports mild discomfort over superior shoulder/AC joint with reaching overhead.  Patient is requesting updated work restrictions today - wanting to return most job tasks.  No interim injury.       Interim History - 2021  Since last visit on 6/10/2021 patient has been feeling better. Trial of working without restrictions has been going really well. Has been working on packing at work and she notes that she is feeling better with working then without work. Has been doing in-office and HEP PT. Ibuprofen, stretching and taking an ice break at work has been helping. Driving hurts her right shoulder the most, specifically when she steers counterclockwise and internal shoulder rotation. No interim injury.       Interim History - 2022  Since last visit on 2021 patient has moderate-severe right deep anterior shoulder, glenohumeral joint pain.  She notes that shoulder pain during peak holiday season at her employer.  Currently wearing shoulder spica brace with activity with some relief.  Patient has not completed MRI as discussed.  No interim injury.       Review of Systems  Musculoskeletal: as above  Remainder of review of systems is negative including constitutional, CV, pulmonary, GI, Skin and Neurologic except as noted in HPI or medical history.    Past Medical History:   Diagnosis Date     Asthma      Mental disorder      Papanicolaou smear of cervix with atypical squamous cells of undetermined significance (ASC-US) 16     Postpartum depression      Past Surgical History:  "  Procedure Laterality Date      SECTION        SECTION N/A 2021    Procedure:  SECTION;  Surgeon: Del Saini MD;  Location: [facility];  Service: Obstetrics     Family History   Problem Relation Age of Onset     Hypertension Mother      GERD Mother      Congenital Anomalies Mother         Spinal Bifada     Anxiety Disorder Mother      Depression Father      Diabetes Paternal Grandmother      Diabetes Mother      Objective  /72   Ht 1.6 m (5' 3\")   Wt 81.2 kg (179 lb)   BMI 31.71 kg/m        General: healthy, alert and in no distress      HEENT: no scleral icterus or conjunctival erythema     Skin: no suspicious lesions or rash. No jaundice.     CV: regular rhythm by palpation, 2+ distal pulses, no pedal edema      Resp: normal respiratory effort without conversational dyspnea     Psych: normal mood and affect      Gait: nonantalgic, appropriate coordination and balance     Neuro: normal light touch sensory exam of the extremities. Motor strength as noted below     Right Shoulder exam    ROM:        Full active and passive ROM with flexion, extension, abduction, internal and external rotation, very mild pain on the right with terminal flexion, abduction, ER    Tender:        subacromial space right, mild       Lateral deltoid mild       GH joint anteriorly minimal       Long head of biceps minimal    Non Tender:       remainder of shoulder       acromioclavicular joint       periscapular region    Strength:        abduction 5-/5, painful       internal rotation 5/5, mild pain       external rotation 5/5, painful       adduction 5/5    Impingement testing:       Mildly painful Neer       neg Gastelum       positive (+) empty can, mild       neg (-) crossover       neg (-) crank    Stability testing:       neg (-) anterior glide       neg (-) sulcus sign    Skin:       no visible deformities       well perfused       capillary refill brisk    Sensation:        normal " "sensation over shoulder and upper extremity     Radiology  XR SHOULDER RIGHT G/E 3 VIEWS 5/13/2021 1:48 PM      HISTORY: Work related injury; Pain in joint of right shoulder                                                               IMPRESSION: Negative exam.      Assessment:  1. Subacromial bursitis of right shoulder joint    2. Pain in joint of right shoulder    3. Work related injury        Plan:  Discussed the assessment with the patient.  Follow up: 8 weeks  Sx were improved overall, worsened again after increased work demand over the holiday season, now improving with decreasing demand back to baseline  Reviewed potential for MRI for worsening sx, was ordered and approved, encouraged her to follow through with this to help better understand any underlying pathology  Letter updated for work:  \"Deborah is being followed in my clinic for her right shoulder injury that occurred at work on June 26, 2020.  She has been evaluated and treated with Tylenol,  physical therapy, steroid injection around the rotator cuff and biceps tendon sheath, as well as work restrictions.  She continues to have signs of rotator cuff tendinitis/bursitis today after a flare of pain with increased demand over the recent holiday season.  She is continuing to work with physical therapy, and I recommended diligence with home exercises.  She may continue to work at this time with no formal restrictions, but should be allowed to take a 15 minute break to ice her shoulder up to twice per day if needed.    She will follow up with me again in 8 weeks to monitor her progress.  An MRI was approved to further assess shoulder pathology, and if the patient proceeds I will contact her and update any recommendations for work or changes to the treatment plan.  Updated recommendations will be provided at her next visit, sooner if needed based on her progress.\"  US guided subacromial bursa and biceps tendon sheath CSI were both helpful, prefer to avoid " repeat if possible  PT/HEP options and strategies reviewed  Supportive care reviewed  All questions were answered today  Contact us with additional questions or concerns  Signs and sx of concern reviewed      Garrett Dixon DO, EB  Primary Care Sports Medicine  [facility]           Disclaimer: This note consists of symbols derived from keyboarding, dictation and/or voice recognition software. As a result, there may be errors in the script that have gone undetected. Please consider this when interpreting information found in this chart.

## 2022-01-18 VITALS
WEIGHT: 171 LBS | SYSTOLIC BLOOD PRESSURE: 118 MMHG | HEART RATE: 74 BPM | BODY MASS INDEX: 30.29 KG/M2 | DIASTOLIC BLOOD PRESSURE: 62 MMHG

## 2022-01-18 VITALS
DIASTOLIC BLOOD PRESSURE: 74 MMHG | TEMPERATURE: 96.6 F | RESPIRATION RATE: 18 BRPM | HEART RATE: 92 BPM | OXYGEN SATURATION: 98 % | BODY MASS INDEX: 30.32 KG/M2 | SYSTOLIC BLOOD PRESSURE: 112 MMHG | WEIGHT: 171.1 LBS | HEIGHT: 63 IN

## 2022-01-18 VITALS
DIASTOLIC BLOOD PRESSURE: 64 MMHG | HEART RATE: 68 BPM | BODY MASS INDEX: 29.76 KG/M2 | OXYGEN SATURATION: 98 % | SYSTOLIC BLOOD PRESSURE: 100 MMHG | WEIGHT: 168 LBS

## 2022-01-18 VITALS
OXYGEN SATURATION: 98 % | DIASTOLIC BLOOD PRESSURE: 67 MMHG | SYSTOLIC BLOOD PRESSURE: 106 MMHG | HEART RATE: 77 BPM | WEIGHT: 160 LBS | RESPIRATION RATE: 14 BRPM | TEMPERATURE: 98.1 F | BODY MASS INDEX: 28.34 KG/M2

## 2022-01-18 VITALS
HEART RATE: 78 BPM | WEIGHT: 196.19 LBS | DIASTOLIC BLOOD PRESSURE: 74 MMHG | SYSTOLIC BLOOD PRESSURE: 111 MMHG | BODY MASS INDEX: 34.75 KG/M2

## 2022-01-18 VITALS
SYSTOLIC BLOOD PRESSURE: 102 MMHG | HEART RATE: 72 BPM | HEART RATE: 89 BPM | OXYGEN SATURATION: 100 % | DIASTOLIC BLOOD PRESSURE: 67 MMHG | BODY MASS INDEX: 29.94 KG/M2 | DIASTOLIC BLOOD PRESSURE: 58 MMHG | SYSTOLIC BLOOD PRESSURE: 102 MMHG | OXYGEN SATURATION: 98 % | TEMPERATURE: 98.2 F | RESPIRATION RATE: 14 BRPM | WEIGHT: 169 LBS | TEMPERATURE: 97.9 F

## 2022-01-18 VITALS
DIASTOLIC BLOOD PRESSURE: 69 MMHG | HEART RATE: 87 BPM | TEMPERATURE: 98 F | SYSTOLIC BLOOD PRESSURE: 112 MMHG | WEIGHT: 170.5 LBS | RESPIRATION RATE: 16 BRPM | BODY MASS INDEX: 30.2 KG/M2

## 2022-01-18 VITALS
OXYGEN SATURATION: 97 % | DIASTOLIC BLOOD PRESSURE: 60 MMHG | SYSTOLIC BLOOD PRESSURE: 112 MMHG | HEIGHT: 63 IN | WEIGHT: 171 LBS | BODY MASS INDEX: 30.3 KG/M2 | TEMPERATURE: 96.5 F | HEART RATE: 79 BPM

## 2022-01-18 VITALS — BODY MASS INDEX: 37.21 KG/M2 | WEIGHT: 210 LBS | HEIGHT: 63 IN

## 2022-01-18 ASSESSMENT — PATIENT HEALTH QUESTIONNAIRE - PHQ9
SUM OF ALL RESPONSES TO PHQ QUESTIONS 1-9: 9
SUM OF ALL RESPONSES TO PHQ QUESTIONS 1-9: 12
SUM OF ALL RESPONSES TO PHQ QUESTIONS 1-9: 8
SUM OF ALL RESPONSES TO PHQ QUESTIONS 1-9: 11

## 2022-01-22 ENCOUNTER — HOSPITAL ENCOUNTER (OUTPATIENT)
Dept: MRI IMAGING | Facility: CLINIC | Age: 27
Discharge: HOME | End: 2022-01-22
Attending: FAMILY MEDICINE | Admitting: FAMILY MEDICINE
Payer: OTHER MISCELLANEOUS

## 2022-01-22 DIAGNOSIS — M75.51 SUBACROMIAL BURSITIS OF RIGHT SHOULDER JOINT: ICD-10-CM

## 2022-01-22 DIAGNOSIS — M25.511 PAIN IN JOINT OF RIGHT SHOULDER: ICD-10-CM

## 2022-01-22 DIAGNOSIS — M75.21 BICEPS TENDONITIS ON RIGHT: ICD-10-CM
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
## 2022-01-22 PROCEDURE — 73221 MRI JOINT UPR EXTREM W/O DYE: CPT | Mod: RT

## 2022-01-22 PROCEDURE — 73221 MRI JOINT UPR EXTREM W/O DYE: CPT | Mod: 26 | Performed by: RADIOLOGY

## 2022-02-08 ENCOUNTER — OFFICE VISIT (OUTPATIENT)
Dept: ORTHOPEDICS | Facility: CLINIC | Age: 27
End: 2022-02-08
Payer: OTHER MISCELLANEOUS

## 2022-02-08 VITALS
HEIGHT: 63 IN | BODY MASS INDEX: 31.71 KG/M2 | DIASTOLIC BLOOD PRESSURE: 76 MMHG | WEIGHT: 179 LBS | SYSTOLIC BLOOD PRESSURE: 120 MMHG

## 2022-02-08 DIAGNOSIS — M75.51 SUBACROMIAL BURSITIS OF RIGHT SHOULDER JOINT: Primary | ICD-10-CM

## 2022-02-08 DIAGNOSIS — M25.511 ARTHRALGIA OF RIGHT ACROMIOCLAVICULAR JOINT: ICD-10-CM

## 2022-02-08 DIAGNOSIS — M25.511 PAIN IN JOINT OF RIGHT SHOULDER: ICD-10-CM
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
## 2022-02-08 PROCEDURE — 99213 OFFICE O/P EST LOW 20 MIN: CPT | Mod: 25 | Performed by: FAMILY MEDICINE

## 2022-02-08 PROCEDURE — 20606 DRAIN/INJ JOINT/BURSA W/US: CPT | Mod: RT | Performed by: FAMILY MEDICINE

## 2022-02-08 RX ADMIN — TRIAMCINOLONE ACETONIDE 40 MG: 40 INJECTION, SUSPENSION INTRA-ARTICULAR; INTRAMUSCULAR at 16:54

## 2022-02-08 RX ADMIN — ROPIVACAINE HYDROCHLORIDE 2 ML: 5 INJECTION, SOLUTION EPIDURAL; INFILTRATION; PERINEURAL at 16:54

## 2022-02-08 ASSESSMENT — MIFFLIN-ST. JEOR: SCORE: 1521.07

## 2022-02-08 NOTE — LETTER
February 8, 2022      Deborah is being followed in my clinic for her right shoulder injury that occurred at work on June 26, 2020.  She has been evaluated and treated with Tylenol,  physical therapy, steroid injection around the rotator cuff and biceps tendon sheath, as well as work restrictions.  We reviewed her MRI today which was reassuring relative to structural damage to the shoulder but she continues to have ongoing pain and limited function.  She continues to have signs of rotator cuff tendinitis/bursitis today after a flare of pain with increased demand over the recent holiday season, as well as pain in the AC joint.  An injection was performed today for diagnostic and hopefully therapeutic value.  Continuing to work with physical therapy is recommended, and I recommended diligence with home exercises as well.  She may continue to work at this time with no formal duty restrictions, but should be allowed to take a 15 minute break to ice her shoulder up to twice per day if needed.  I do recommend a maximum of 8 hours per day.    She will follow up with me again in 8 weeks to monitor her progress. Updated recommendations will be provided at her next visit, sooner if needed based on her progress.      Garrett Zambrano, , CAQ  Primary Care Sports Medicine  [facility]

## 2022-02-08 NOTE — LETTER
2022         RE: Deborah Swartz  [address] [place] [address]        Dear Colleague,    Thank you for referring your patient, Deborah Swartz, to the [facility] [place]. Please see a copy of my visit note below.    Deborah Swartz  :  1995  DOS: 22  MRN: [id number]    Sports Medicine Clinic Visit    PCP: No primary care provider on file.    Deborah Swartz is a 25 year old Right hand dominant female who is seen in consultation at the request of  Aaron Ewing M.D. presenting with right shoulder injury.    Interim History - Sharon 10, 2021  Since last visit on 2021 patient has minimal right shoulder pain.  She reports mild discomfort over superior shoulder/AC joint with reaching overhead.  Patient is requesting updated work restrictions today - wanting to return most job tasks.  No interim injury.       Interim History - 2021  Since last visit on 6/10/2021 patient has been feeling better. Trial of working without restrictions has been going really well. Has been working on packing at work and she notes that she is feeling better with working then without work. Has been doing in-office and HEP PT. Ibuprofen, stretching and taking an ice break at work has been helping. Driving hurts her right shoulder the most, specifically when she steers counterclockwise and internal shoulder rotation. No interim injury.       Interim History - 2022  Since last visit on 2021 patient has moderate-severe right deep anterior shoulder, glenohumeral joint pain.  She notes that shoulder pain during peak holiday season at her employer.  Currently wearing shoulder spica brace with activity with some relief.  Patient has not completed MRI as discussed.  No interim injury.      Interim History - 2022  Since last visit on 2022 patient has no change in right shoulder pain.  She presents today to review MRI results and discuss work  "restrictions.  No interim injury.          Review of Systems  Musculoskeletal: as above  Remainder of review of systems is negative including constitutional, CV, pulmonary, GI, Skin and Neurologic except as noted in HPI or medical history.    Past Medical History:   Diagnosis Date     Asthma      Mental disorder      Papanicolaou smear of cervix with atypical squamous cells of undetermined significance (ASC-US) 16     Postpartum depression      Past Surgical History:   Procedure Laterality Date      SECTION        SECTION N/A 2021    Procedure:  SECTION;  Surgeon: Del Saini MD;  Location: [facility];  Service: Obstetrics     Family History   Problem Relation Age of Onset     Hypertension Mother      GERD Mother      Congenital Anomalies Mother         Spinal Bifada     Anxiety Disorder Mother      Depression Father      Diabetes Paternal Grandmother      Diabetes Mother      Objective  /76   Ht 1.6 m (5' 3\")   Wt 81.2 kg (179 lb)   BMI 31.71 kg/m        General: healthy, alert and in no distress      HEENT: no scleral icterus or conjunctival erythema     Skin: no suspicious lesions or rash. No jaundice.     CV: regular rhythm by palpation, 2+ distal pulses, no pedal edema      Resp: normal respiratory effort without conversational dyspnea     Psych: normal mood and affect      Gait: nonantalgic, appropriate coordination and balance     Neuro: normal light touch sensory exam of the extremities. Motor strength as noted below     Right Shoulder exam    ROM:        Full active and passive ROM with flexion, extension, abduction, internal and external rotation, mild pain on the right with terminal flexion, abduction, ER    Tender:        subacromial space right, mild/moderate       Lateral deltoid mild       GH joint anteriorly minimal       Long head of biceps mild       acromioclavicular joint    Non Tender:       remainder of shoulder       periscapular " region    Strength:        abduction 5-/5, painful       internal rotation 5/5, mild pain       external rotation 5/5, painful       adduction 5/5    Impingement testing:       Mildly painful Neer       neg Gastelum       positive (+) empty can, mild/moderate       painful crossover       neg (-) crank    Stability testing:       neg (-) anterior glide       neg (-) sulcus sign    Skin:       no visible deformities       well perfused       capillary refill brisk    Sensation:        normal sensation over shoulder and upper extremity     Radiology  Results for orders placed or performed during the hospital encounter of 01/22/22   MR Shoulder Right w/o Contrast    Narrative    EXAM: MR right shoulder without  contrast 1/24/2022 8:33 AM    TECHNIQUE: Multiplanar, multisequence imaging of the right shoulder  were obtained without administration of intravenous or intra-articular  gadolinium contrast using routine protocol.    History: Shoulder pain, bursitis suspected, xray done; Work related  injury; Subacromial bursitis of right shoulder joint; Biceps  tendonitis on right; Pain in joint of right shoulder    Comparison: 5/13/2021    Findings:    ROTATOR CUFF and ASSOCIATED STRUCTURES  Rotator cuff: Supraspinatus, infraspinatus, teres minor and  subscapularis tendons are intact.     Bursa: No subacromial or subdeltoid bursal fluid.    Musculature: Muscle bulk of rotator cuff is preserved.  Deltoid muscle  bulk is also preserved.  No muscle edema.    Acromioclavicular joint  There is no substantial degenerative changes of the acromioclavicular  joint. Acromion is type 2 in sagittal morphology. Coracoacromial  ligament is not thickened.    OSSEOUS STRUCTURES  No fracture, marrow contusion. Diffuse marrow signal alteration with  mild T1 hypointensity including humeral epiphysis, most consistent  with red marrow, relatively prominent for age. Nonspecific small  cystlike changes at the posterior humeral head, likely  small  intraosseous ganglion cysts.    LONG BICIPITAL TENDON  The long head of the biceps tendon is normally situated within the  bicipital groove. No complete or partial biceps tendon tear is  present.    GLENOHUMERAL JOINT  Joint fluid: Physiologic amount of joint fluid is  present.    Cartilage and subarticular bone:  No focal hyaline cartilage defects  are noted. No Hill-Sachs, reverse Hill-Sachs, or bony Bankart lesions  are seen.    Labrum: Limited assessment on this study with relative lack of joint  distention shows no labral tear.    ANCILLARY FINDINGS:      Impression    Impression:  1. No internal derangement.  2. Relatively prominent red marrow, nonspecific but can be seen in  setting of anemia of chronic disease, obesity, smoking and other  etiologies.    [employer]         SYSTEM ID:  [id number]       Medium Joint Injection/Arthrocentesis: R acromioclavicular    Date/Time: 2/8/2022 4:54 PM  Performed by: Garrett Dixon DO  Authorized by: Garrett Dixon DO     Indications:  Pain  Needle Size:  25 G  Guidance: ultrasound    Approach:  Superior  Location:  Shoulder  Site:  R acromioclavicular  Medications:  40 mg triamcinolone 40 MG/ML; 2 mL ropivacaine 5 MG/ML  Outcome:  Tolerated well, no immediate complications  Procedure discussed: discussed risks, benefits, and alternatives    Consent Given by:  Patient  Timeout: timeout called immediately prior to procedure    Prep: patient was prepped and draped in usual sterile fashion          Assessment:  1. Subacromial bursitis of right shoulder joint    2. Pain in joint of right shoulder    3. Work related injury    4. Arthralgia of right acromioclavicular joint        Plan:  Discussed the assessment with the patient.  Follow up: 8 weeks  Sx were improved overall, worsened again after increased work demand over the holiday season,milder RTC irritability today with decreasing demand back to baseline, though increased AC joint  "pain  Reviewed MRI which was reassuring overall for significant structural pathology  Letter updated for work:  \"Deborah is being followed in my clinic for her right shoulder injury that occurred at work on June 26, 2020.  She has been evaluated and treated with Tylenol,  physical therapy, steroid injection around the rotator cuff and biceps tendon sheath, as well as work restrictions.  We reviewed her MRI today which was reassuring relative to structural damage to the shoulder but she continues to have ongoing pain and limited function.  She continues to have signs of rotator cuff tendinitis/bursitis today after a flare of pain with increased demand over the recent holiday season, as well as pain in the AC joint.  An injection was performed today for diagnostic and hopefully therapeutic value.  Continuing to work with physical therapy is recommended, and I recommended diligence with home exercises as well.  She may continue to work at this time with no formal duty restrictions, but should be allowed to take a 15 minute break to ice her shoulder up to twice per day if needed.  I do recommend a maximum of 8 hours per day.    She will follow up with me again in 8 weeks to monitor her progress. Updated recommendations will be provided at her next visit, sooner if needed based on her progress.\"  US guided subacromial bursa and biceps tendon sheath CSI were both helpful, prefer to avoid repeat if possible  PT/HEP options and strategies reviewed  Expectations and goals of CSI reviewed  Often 2-3 days for steroid effect, and can take up to two weeks for maximum effect  We discussed modified progressive pain-free activity as tolerated  Do not overuse in first two weeks if feeling better due to concern for vulnerability while steroid is working  Supportive care reviewed  All questions were answered today  Contact us with additional questions or concerns  Signs and sx of concern reviewed      Garrett Dixon DO, EB  Primary Care " Sports Medicine  [facility]           Disclaimer: This note consists of symbols derived from keyboarding, dictation and/or voice recognition software. As a result, there may be errors in the script that have gone undetected. Please consider this when interpreting information found in this chart.      Again, thank you for allowing me to participate in the care of your patient.        Sincerely,        Garrett Dixon, DO

## 2022-02-08 NOTE — PROGRESS NOTES
Deborah Swartz  :  1995  DOS: 22  MRN: [id number]    Sports Medicine Clinic Visit    PCP: No primary care provider on file.    Deborah Swartz is a 25 year old Right hand dominant female who is seen in consultation at the request of  Aaron Ewing M.D. presenting with right shoulder injury.    Interim History - Sharon 10, 2021  Since last visit on 2021 patient has minimal right shoulder pain.  She reports mild discomfort over superior shoulder/AC joint with reaching overhead.  Patient is requesting updated work restrictions today - wanting to return most job tasks.  No interim injury.       Interim History - 2021  Since last visit on 6/10/2021 patient has been feeling better. Trial of working without restrictions has been going really well. Has been working on packing at work and she notes that she is feeling better with working then without work. Has been doing in-office and HEP PT. Ibuprofen, stretching and taking an ice break at work has been helping. Driving hurts her right shoulder the most, specifically when she steers counterclockwise and internal shoulder rotation. No interim injury.       Interim History - 2022  Since last visit on 2021 patient has moderate-severe right deep anterior shoulder, glenohumeral joint pain.  She notes that shoulder pain during peak holiday season at her employer.  Currently wearing shoulder spica brace with activity with some relief.  Patient has not completed MRI as discussed.  No interim injury.      Interim History - 2022  Since last visit on 2022 patient has no change in right shoulder pain.  She presents today to review MRI results and discuss work restrictions.  No interim injury.          Review of Systems  Musculoskeletal: as above  Remainder of review of systems is negative including constitutional, CV, pulmonary, GI, Skin and Neurologic except as noted in HPI or medical history.    Past Medical History:  "  Diagnosis Date     Asthma      Mental disorder      Papanicolaou smear of cervix with atypical squamous cells of undetermined significance (ASC-US) 16     Postpartum depression      Past Surgical History:   Procedure Laterality Date      SECTION        SECTION N/A 2021    Procedure:  SECTION;  Surgeon: Del Saini MD;  Location: [facility] L[facility]D OR;  Service: Obstetrics     Family History   Problem Relation Age of Onset     Hypertension Mother      GERD Mother      Congenital Anomalies Mother         Spinal Bifada     Anxiety Disorder Mother      Depression Father      Diabetes Paternal Grandmother      Diabetes Mother      Objective  /76   Ht 1.6 m (5' 3\")   Wt 81.2 kg (179 lb)   BMI 31.71 kg/m        General: healthy, alert and in no distress      HEENT: no scleral icterus or conjunctival erythema     Skin: no suspicious lesions or rash. No jaundice.     CV: regular rhythm by palpation, 2+ distal pulses, no pedal edema      Resp: normal respiratory effort without conversational dyspnea     Psych: normal mood and affect      Gait: nonantalgic, appropriate coordination and balance     Neuro: normal light touch sensory exam of the extremities. Motor strength as noted below     Right Shoulder exam    ROM:        Full active and passive ROM with flexion, extension, abduction, internal and external rotation, mild pain on the right with terminal flexion, abduction, ER    Tender:        subacromial space right, mild/moderate       Lateral deltoid mild       GH joint anteriorly minimal       Long head of biceps mild       acromioclavicular joint    Non Tender:       remainder of shoulder       periscapular region    Strength:        abduction 5-/5, painful       internal rotation 5/5, mild pain       external rotation 5/5, painful       adduction 5/5    Impingement testing:       Mildly painful Neer       lucia Gastelum       positive (+) empty can, mild/moderate       painful " crossover       neg (-) crank    Stability testing:       neg (-) anterior glide       neg (-) sulcus sign    Skin:       no visible deformities       well perfused       capillary refill brisk    Sensation:        normal sensation over shoulder and upper extremity     Radiology  Results for orders placed or performed during the hospital encounter of 01/22/22   MR Shoulder Right w/o Contrast    Narrative    EXAM: MR right shoulder without  contrast 1/24/2022 8:33 AM    TECHNIQUE: Multiplanar, multisequence imaging of the right shoulder  were obtained without administration of intravenous or intra-articular  gadolinium contrast using routine protocol.    History: Shoulder pain, bursitis suspected, xray done; Work related  injury; Subacromial bursitis of right shoulder joint; Biceps  tendonitis on right; Pain in joint of right shoulder    Comparison: 5/13/2021    Findings:    ROTATOR CUFF and ASSOCIATED STRUCTURES  Rotator cuff: Supraspinatus, infraspinatus, teres minor and  subscapularis tendons are intact.     Bursa: No subacromial or subdeltoid bursal fluid.    Musculature: Muscle bulk of rotator cuff is preserved.  Deltoid muscle  bulk is also preserved.  No muscle edema.    Acromioclavicular joint  There is no substantial degenerative changes of the acromioclavicular  joint. Acromion is type 2 in sagittal morphology. Coracoacromial  ligament is not thickened.    OSSEOUS STRUCTURES  No fracture, marrow contusion. Diffuse marrow signal alteration with  mild T1 hypointensity including humeral epiphysis, most consistent  with red marrow, relatively prominent for age. Nonspecific small  cystlike changes at the posterior humeral head, likely small  intraosseous ganglion cysts.    LONG BICIPITAL TENDON  The long head of the biceps tendon is normally situated within the  bicipital groove. No complete or partial biceps tendon tear is  present.    GLENOHUMERAL JOINT  Joint fluid: Physiologic amount of joint fluid is   "present.    Cartilage and subarticular bone:  No focal hyaline cartilage defects  are noted. No Hill-Sachs, reverse Hill-Sachs, or bony Bankart lesions  are seen.    Labrum: Limited assessment on this study with relative lack of joint  distention shows no labral tear.    ANCILLARY FINDINGS:      Impression    Impression:  1. No internal derangement.  2. Relatively prominent red marrow, nonspecific but can be seen in  setting of anemia of chronic disease, obesity, smoking and other  etiologies.    [employer]         SYSTEM ID:  [id number]       Medium Joint Injection/Arthrocentesis: R acromioclavicular    Date/Time: 2/8/2022 4:54 PM  Performed by: Garrett Dixon DO  Authorized by: Garrett Dixon DO     Indications:  Pain  Needle Size:  25 G  Guidance: ultrasound    Approach:  Superior  Location:  Shoulder  Site:  R acromioclavicular  Medications:  40 mg triamcinolone 40 MG/ML; 2 mL ropivacaine 5 MG/ML  Outcome:  Tolerated well, no immediate complications  Procedure discussed: discussed risks, benefits, and alternatives    Consent Given by:  Patient  Timeout: timeout called immediately prior to procedure    Prep: patient was prepped and draped in usual sterile fashion          Assessment:  1. Subacromial bursitis of right shoulder joint    2. Pain in joint of right shoulder    3. Work related injury    4. Arthralgia of right acromioclavicular joint        Plan:  Discussed the assessment with the patient.  Follow up: 8 weeks  Sx were improved overall, worsened again after increased work demand over the holiday season,milder RTC irritability today with decreasing demand back to baseline, though increased AC joint pain  Reviewed MRI which was reassuring overall for significant structural pathology  Letter updated for work:  \"Deborah is being followed in my clinic for her right shoulder injury that occurred at work on June 26, 2020.  She has been evaluated and treated with Tylenol,  physical " "therapy, steroid injection around the rotator cuff and biceps tendon sheath, as well as work restrictions.  We reviewed her MRI today which was reassuring relative to structural damage to the shoulder but she continues to have ongoing pain and limited function.  She continues to have signs of rotator cuff tendinitis/bursitis today after a flare of pain with increased demand over the recent holiday season, as well as pain in the AC joint.  An injection was performed today for diagnostic and hopefully therapeutic value.  Continuing to work with physical therapy is recommended, and I recommended diligence with home exercises as well.  She may continue to work at this time with no formal duty restrictions, but should be allowed to take a 15 minute break to ice her shoulder up to twice per day if needed.  I do recommend a maximum of 8 hours per day.    She will follow up with me again in 8 weeks to monitor her progress. Updated recommendations will be provided at her next visit, sooner if needed based on her progress.\"  US guided subacromial bursa and biceps tendon sheath CSI were both helpful, prefer to avoid repeat if possible  PT/HEP options and strategies reviewed  Expectations and goals of CSI reviewed  Often 2-3 days for steroid effect, and can take up to two weeks for maximum effect  We discussed modified progressive pain-free activity as tolerated  Do not overuse in first two weeks if feeling better due to concern for vulnerability while steroid is working  Supportive care reviewed  All questions were answered today  Contact us with additional questions or concerns  Signs and sx of concern reviewed      Garrett Dixon DO, EB  Primary Care Sports Medicine  [facility]           Disclaimer: This note consists of symbols derived from keyboarding, dictation and/or voice recognition software. As a result, there may be errors in the script that have gone undetected. Please consider this when " interpreting information found in this chart.

## 2022-02-21 RX ORDER — ROPIVACAINE HYDROCHLORIDE 5 MG/ML
2 INJECTION, SOLUTION EPIDURAL; INFILTRATION; PERINEURAL
Status: DISCONTINUED | OUTPATIENT
Start: 2022-02-08 | End: 2022-06-30 | Stop reason: ALTCHOICE

## 2022-02-21 RX ORDER — TRIAMCINOLONE ACETONIDE 40 MG/ML
40 INJECTION, SUSPENSION INTRA-ARTICULAR; INTRAMUSCULAR
Status: DISCONTINUED | OUTPATIENT
Start: 2022-02-08 | End: 2022-06-30 | Stop reason: ALTCHOICE

## 2022-02-27 ENCOUNTER — HEALTH MAINTENANCE LETTER (OUTPATIENT)
Age: 27
End: 2022-02-27

## 2022-04-20 ENCOUNTER — OFFICE VISIT (OUTPATIENT)
Dept: FAMILY MEDICINE | Facility: CLINIC | Age: 27
End: 2022-04-20
Payer: COMMERCIAL

## 2022-04-20 VITALS
DIASTOLIC BLOOD PRESSURE: 72 MMHG | WEIGHT: 182 LBS | HEART RATE: 66 BPM | OXYGEN SATURATION: 99 % | RESPIRATION RATE: 20 BRPM | SYSTOLIC BLOOD PRESSURE: 119 MMHG | TEMPERATURE: 97.5 F | BODY MASS INDEX: 32.24 KG/M2

## 2022-04-20 DIAGNOSIS — R53.83 FATIGUE, UNSPECIFIED TYPE: Primary | ICD-10-CM

## 2022-04-20 DIAGNOSIS — R42 LIGHTHEADEDNESS: ICD-10-CM

## 2022-04-20 LAB
ALBUMIN SERPL-MCNC: 3.9 G/DL (ref 3.5–5)
ALP SERPL-CCNC: 88 U/L (ref 45–120)
ALT SERPL W P-5'-P-CCNC: 27 U/L (ref 0–45)
ANION GAP SERPL CALCULATED.3IONS-SCNC: 11 MMOL/L (ref 5–18)
AST SERPL W P-5'-P-CCNC: 22 U/L (ref 0–40)
BILIRUB SERPL-MCNC: 0.5 MG/DL (ref 0–1)
BUN SERPL-MCNC: 9 MG/DL (ref 8–22)
CALCIUM SERPL-MCNC: 9.4 MG/DL (ref 8.5–10.5)
CHLORIDE BLD-SCNC: 107 MMOL/L (ref 98–107)
CO2 SERPL-SCNC: 23 MMOL/L (ref 22–31)
CREAT SERPL-MCNC: 0.77 MG/DL (ref 0.6–1.1)
ERYTHROCYTE [DISTWIDTH] IN BLOOD BY AUTOMATED COUNT: 12.5 % (ref 10–15)
GFR SERPL CREATININE-BSD FRML MDRD: >90 ML/MIN/1.73M2
GLUCOSE BLD-MCNC: 89 MG/DL (ref 70–125)
HCT VFR BLD AUTO: 41.9 % (ref 35–47)
HGB BLD-MCNC: 13.6 G/DL (ref 11.7–15.7)
MCH RBC QN AUTO: 28.3 PG (ref 26.5–33)
MCHC RBC AUTO-ENTMCNC: 32.5 G/DL (ref 31.5–36.5)
MCV RBC AUTO: 87 FL (ref 78–100)
PLATELET # BLD AUTO: 235 10E3/UL (ref 150–450)
POTASSIUM BLD-SCNC: 4.2 MMOL/L (ref 3.5–5)
PROT SERPL-MCNC: 7.7 G/DL (ref 6–8)
RBC # BLD AUTO: 4.81 10E6/UL (ref 3.8–5.2)
SODIUM SERPL-SCNC: 141 MMOL/L (ref 136–145)
TSH SERPL DL<=0.005 MIU/L-ACNC: 0.43 UIU/ML (ref 0.3–5)
WBC # BLD AUTO: 8.8 10E3/UL (ref 4–11)

## 2022-04-20 PROCEDURE — 99214 OFFICE O/P EST MOD 30 MIN: CPT | Performed by: PHYSICIAN ASSISTANT

## 2022-04-20 PROCEDURE — 36415 COLL VENOUS BLD VENIPUNCTURE: CPT | Performed by: PHYSICIAN ASSISTANT

## 2022-04-20 PROCEDURE — 80053 COMPREHEN METABOLIC PANEL: CPT | Performed by: PHYSICIAN ASSISTANT

## 2022-04-20 PROCEDURE — 85027 COMPLETE CBC AUTOMATED: CPT | Performed by: PHYSICIAN ASSISTANT

## 2022-04-20 PROCEDURE — 84443 ASSAY THYROID STIM HORMONE: CPT | Performed by: PHYSICIAN ASSISTANT

## 2022-04-20 NOTE — PROGRESS NOTES
URGENT CARE VISIT:    SUBJECTIVE:   Deborah Swartz is a 26 year old female who presents for fatigue, lightheadedness, and right knee and arm weakness since yesterday. She felt like her right leg and arm gave out. She feels better today. Nothing makes it better or worse. Treatments tried include none with no relief of symptoms. She denies headache, sob, palpitations, or vision changes.    PMH:   Past Medical History:   Diagnosis Date     Asthma      Mental disorder      Papanicolaou smear of cervix with atypical squamous cells of undetermined significance (ASC-US) 16     Postpartum depression      Allergies: Cherry and Fluoxetine  Medications:   Current Outpatient Medications   Medication Sig Dispense Refill     albuterol (PROAIR HFA/PROVENTIL HFA/VENTOLIN HFA) 108 (90 Base) MCG/ACT Inhaler Inhale 2 puffs into the lungs every 4 hours as needed for shortness of breath / dyspnea or wheezing (Patient not taking: No sig reported) 1 Inhaler 1     Social History:   Social History     Tobacco Use     Smoking status: Former Smoker     Packs/day: 0.01     Years: 3.00     Pack years: 0.03     Types: Cigarettes     Quit date: 2020     Years since quittin.6     Smokeless tobacco: Former User     Quit date: 2015     Tobacco comment: 1 cig every 3 days.   Substance Use Topics     Alcohol use: Yes     Comment: rare       ROS: ROS otherwise found to be negative except as noted above.     OBJECTIVE:  /72 (Patient Position: Standing)   Pulse 66   Temp 97.5  F (36.4  C) (Axillary)   Resp 20   Wt 82.6 kg (182 lb)   LMP 2022   SpO2 99%   BMI 32.24 kg/m    General: WDWN in NAD  Eyes: EOMI,  PERRL, conjunctiva clear  HENT: Ear canals and TM's normal.  Nose and mouth without ulcers, erythema or lesions  Neck: Supple, non-tender  Cardiac: RRR without murmurs, rubs, or gallops.  Respiratory: LCTAB without adventitious sounds. Non-labored breathing.  Abdominal: Active bowel sounds in all four quadrants.  Soft, non-tender without guarding or rebound.   Extremities: No peripheral edema or tenderness, peripheral pulses normal  Musculoskeletal: No gross deformities noted, no erythema, FROM noted in all extremities  Neuro: Normal strength and tone, sensory exam grossly normal,  normal speech and mentation  Integumentary: No suspicious rashes or lesions.     Labs:  Results for orders placed or performed in visit on 04/20/22   TSH with free T4 reflex     Status: Normal   Result Value Ref Range    TSH 0.43 0.30 - 5.00 uIU/mL   Comprehensive metabolic panel     Status: Normal   Result Value Ref Range    Sodium 141 136 - 145 mmol/L    Potassium 4.2 3.5 - 5.0 mmol/L    Chloride 107 98 - 107 mmol/L    Carbon Dioxide (CO2) 23 22 - 31 mmol/L    Anion Gap 11 5 - 18 mmol/L    Urea Nitrogen 9 8 - 22 mg/dL    Creatinine 0.77 0.60 - 1.10 mg/dL    Calcium 9.4 8.5 - 10.5 mg/dL    Glucose 89 70 - 125 mg/dL    Alkaline Phosphatase 88 45 - 120 U/L    AST 22 0 - 40 U/L    ALT 27 0 - 45 U/L    Protein Total 7.7 6.0 - 8.0 g/dL    Albumin 3.9 3.5 - 5.0 g/dL    Bilirubin Total 0.5 0.0 - 1.0 mg/dL    GFR Estimate >90 >60 mL/min/1.73m2   CBC with platelets and differential     Status: Normal   Result Value Ref Range    WBC Count 8.8 4.0 - 11.0 10e3/uL    RBC Count 4.81 3.80 - 5.20 10e6/uL    Hemoglobin 13.6 11.7 - 15.7 g/dL    Hematocrit 41.9 35.0 - 47.0 %    MCV 87 78 - 100 fL    MCH 28.3 26.5 - 33.0 pg    MCHC 32.5 31.5 - 36.5 g/dL    RDW 12.5 10.0 - 15.0 %    Platelet Count 235 150 - 450 10e3/uL   CBC with platelets and differential     Status: Normal    Narrative    The following orders were created for panel order CBC with platelets and differential.  Procedure                               Abnormality         Status                     ---------                               -----------         ------                     CBC with platelets and d...[[id number]]  Normal              Final result                 Please view results for these  tests on the individual orders.       ASSESSMENT:     ICD-10-CM    1. Fatigue, unspecified type  R53.83 CBC with platelets and differential     TSH with free T4 reflex     Comprehensive metabolic panel     CBC with platelets and differential     TSH with free T4 reflex     Comprehensive metabolic panel   2. Lightheadedness  R42         PLAN:  30 minutes spent on the date of the encounter doing chart review, review of test results, interpretation of tests, patient visit and documentation   Patient Instructions   Patient was educated on possible etiologies including infection, thyroid or electrolyte abnormalities, insomnia, stress, dehydration, MS, and other neurological conditions. She has multiple body system complaints with no clear etiology. CBC, CMP, TSH, and orthostatics were normal. Right hand and leg intermittent weakness is concerning for neurological etiology such as MS. She is asymptomatic currently. See your primary care provider within 7 days. Consider MRI of head. Seek emergency care if you develop chest pain, shortness of breath, or severe headache.     Patient verbalized understanding and is agreeable to plan. The patient was discharged ambulatory and in stable condition.    Shaneka Lazcano PA-C ....................  4/20/2022   4:28 PM

## 2022-04-20 NOTE — LETTER
[facility]  [address] [place] [address] [address]  [facility] [address]  [phone]      April 20, 2022    RE:  Deborah Swartz                                                                                                                                                       [address] [place] [address]            To whom it may concern:    Deborah Swartz was seen in clinic today. She will have a follow-up clinic appointment.         Sincerely,        PUJA Cosme [facility]

## 2022-04-20 NOTE — PATIENT INSTRUCTIONS
Patient was educated on possible etiologies including infection, thyroid or electrolyte abnormalities, dehydration, MS, and other neurological conditions. She has multiple body system complaints with no clear etiology. CBC, CMP, TSH, and orthostatics were normal. Right hand and leg intermittent weakness  is concerning for neurological etiology. She is asymptomatic currently. See your primary care provider within 7 days. Consider MRI of head. Seek emergency care if you develop chest pain, shortness of breath, or severe headache.

## 2022-04-22 ENCOUNTER — OFFICE VISIT (OUTPATIENT)
Dept: FAMILY MEDICINE | Facility: CLINIC | Age: 27
End: 2022-04-22
Payer: COMMERCIAL

## 2022-04-22 VITALS
OXYGEN SATURATION: 99 % | DIASTOLIC BLOOD PRESSURE: 82 MMHG | BODY MASS INDEX: 32.49 KG/M2 | WEIGHT: 183.4 LBS | SYSTOLIC BLOOD PRESSURE: 117 MMHG | HEART RATE: 76 BPM

## 2022-04-22 DIAGNOSIS — J45.20 INTERMITTENT ASTHMA, UNCOMPLICATED: ICD-10-CM

## 2022-04-22 DIAGNOSIS — G89.29 CHRONIC PAIN OF RIGHT KNEE: ICD-10-CM

## 2022-04-22 DIAGNOSIS — R53.83 FATIGUE, UNSPECIFIED TYPE: Primary | ICD-10-CM

## 2022-04-22 DIAGNOSIS — R53.1 WEAKNESS: ICD-10-CM

## 2022-04-22 DIAGNOSIS — M25.561 CHRONIC PAIN OF RIGHT KNEE: ICD-10-CM

## 2022-04-22 LAB — HCG UR QL: NEGATIVE

## 2022-04-22 PROCEDURE — 81025 URINE PREGNANCY TEST: CPT | Performed by: PHYSICIAN ASSISTANT

## 2022-04-22 PROCEDURE — 99213 OFFICE O/P EST LOW 20 MIN: CPT | Performed by: PHYSICIAN ASSISTANT

## 2022-04-22 RX ORDER — ALBUTEROL SULFATE 90 UG/1
2 AEROSOL, METERED RESPIRATORY (INHALATION) EVERY 4 HOURS PRN
Qty: 18 G | Refills: 3 | Status: SHIPPED | OUTPATIENT
Start: 2022-04-22 | End: 2022-10-19

## 2022-04-22 ASSESSMENT — ENCOUNTER SYMPTOMS: FATIGUE: 1

## 2022-04-22 NOTE — LETTER
April 22, 2022    RE:  Deborah Swartz                                                                                                                                                       [address] [place] [address]            To whom it may concern:    Deborah Swartz is under my professional care and was seen in the clinic on 4/22/22.  She  may return to work with the following: no restrictions on 4/27/22.    Sincerely,        Jazmine Álvarez PA-C

## 2022-04-22 NOTE — PROGRESS NOTES
"Assessment and Plan:     (R53.83) Fatigue, unspecified type  (primary encounter diagnosis)  Comment: recent labs at  normal, suspect 2/2 to lack of sleep/working night shift  Plan: HCG Qual, Urine ([id number])  Discussed getting better sleep between shifts, she may need to switch to day shift if she cannot tolerate working nights    (M25.561,  G89.29) Chronic pain of right knee  Comment: pain/giving out x years, suspect meniscus issues  Plan: Orthopedic  Referral    (R53.1) Weakness  Comment: generalized, intermittent, neuro exam normal today, history not c/w seizure, CVA, meningitis, discussed with her that I do not think this represents a neurological process, she was insistent that she see a neurologist, she thinks she could have narcolepsy, MS or seizures   Plan: Adult Neurology  Referral    (J45.20) Intermittent asthma, uncomplicated  Comment: needs alb inhaler  Plan: albuterol (PROAIR HFA/PROVENTIL HFA/VENTOLIN         HFA) 108 (90 Base) MCG/ACT inhaler    Needs a PCP, recommend she make an establish care visit    Jazmine Álvarez PA-C        Sg Smith is a 26 year old who presents for the following health issues     Symptom onset 4/19/22  She went to work (works night shift at [employer]) after not sleeping x 2 days  She noticed some right and left sided weakness, first her right knee \"went out\" on her then she felt weak all over  She also felt exhausted  She then went to [facility] ([facility])  She fell on the way to [facility] because both legs went out on her  She had vitals checked--temp was 99 and was told the rest of her vitals were \"irregular and showing that she was moving\" she does not know what they were  She also reports light sensitivity and mild dizziness with episode  She notes that she had diffuse weakness of her entire body  She has had issues with right knee x years and has been told she has a cartilage tear     Since recent episode she feels generally weak " "and \"weird\"    She denies chest pain, sob, nausea/vomiting, significant headache    Needs Refills for her Albuterol inhaler.      Review of Systems   Constitutional: Positive for fatigue.      See above      Objective    LMP 03/20/2022      /82   Pulse 76   Wt 83.2 kg (183 lb 6.4 oz)   LMP 03/20/2022   SpO2 99%   BMI 32.49 kg/m        Physical Exam     GENERAL: healthy, alert and no distress  RESP: lungs clear to auscultation - no rales, no rhonchi, no wheezes  CV: regular rates and rhythm, normal S1 S2, no S3 or S4 and no murmur, no click or rub   MS: extremities- no gross deformities noted, no edema, right knee non-tender, full ROM, stable to varus/valgus stress  NEUROLOGICAL EXAM:  Face symmetrical with raised eyebrows, puffed cheeks and smile, normal speech, tongue is midline, APRIL, EOMI, equal strength bilaterally w/shoulder shrug   strength equal/intact  Elbow flexion and extension is equal/intact  Knee flexion and extension is equal/intact  Able to heel/toe stand, squat without difficulty  Gait is steady, no ataxia              "

## 2022-04-22 NOTE — RESULT ENCOUNTER NOTE
Dear Luis,     Here are your recent pregnancy results which are negative.    Please let us know if you have any questions or concerns.    Regards,  Jazmine Álvarez PA-C

## 2022-05-11 ENCOUNTER — OFFICE VISIT (OUTPATIENT)
Dept: INTERNAL MEDICINE | Facility: CLINIC | Age: 27
End: 2022-05-11
Payer: COMMERCIAL

## 2022-05-11 VITALS
OXYGEN SATURATION: 98 % | BODY MASS INDEX: 31.01 KG/M2 | WEIGHT: 175 LBS | HEART RATE: 110 BPM | DIASTOLIC BLOOD PRESSURE: 70 MMHG | SYSTOLIC BLOOD PRESSURE: 106 MMHG | HEIGHT: 63 IN | TEMPERATURE: 98.3 F

## 2022-05-11 DIAGNOSIS — M62.81 GENERALIZED MUSCLE WEAKNESS: Primary | ICD-10-CM

## 2022-05-11 DIAGNOSIS — J02.0 STREP PHARYNGITIS: ICD-10-CM

## 2022-05-11 LAB
ATRIAL RATE - MUSE: 67 BPM
DEPRECATED S PYO AG THROAT QL EIA: POSITIVE
DIASTOLIC BLOOD PRESSURE - MUSE: NORMAL MMHG
INTERPRETATION ECG - MUSE: NORMAL
P AXIS - MUSE: 27 DEGREES
PR INTERVAL - MUSE: 144 MS
QRS DURATION - MUSE: 78 MS
QT - MUSE: 364 MS
QTC - MUSE: 384 MS
R AXIS - MUSE: 13 DEGREES
SYSTOLIC BLOOD PRESSURE - MUSE: NORMAL MMHG
T AXIS - MUSE: 21 DEGREES
VENTRICULAR RATE- MUSE: 67 BPM

## 2022-05-11 PROCEDURE — 93005 ELECTROCARDIOGRAM TRACING: CPT | Performed by: NURSE PRACTITIONER

## 2022-05-11 PROCEDURE — 99215 OFFICE O/P EST HI 40 MIN: CPT | Performed by: NURSE PRACTITIONER

## 2022-05-11 PROCEDURE — 87880 STREP A ASSAY W/OPTIC: CPT | Performed by: NURSE PRACTITIONER

## 2022-05-11 RX ORDER — AMOXICILLIN 875 MG
875 TABLET ORAL 2 TIMES DAILY
Qty: 20 TABLET | Refills: 0 | Status: SHIPPED | OUTPATIENT
Start: 2022-05-11 | End: 2022-05-21

## 2022-05-11 NOTE — PROGRESS NOTES
Internal Medicine Office Visit  [facility]   Patient Name: Deborah Swartz  Patient Age: 26 year old  YOB: 1995  MRN: [id number]    Date of Visit: 5/11/2022  Patient presents with:  Colapsed: At work , a couple weeks ago , was not unconscious, has happened a couple times at home recently           Assessment / Plan / Medical Decision Making:    Problem List Items Addressed This Visit    None     Visit Diagnoses     Generalized muscle weakness    -  Primary    Relevant Orders    EKG 12-lead, tracing only (Completed)    Strep pharyngitis        Relevant Medications    amoxicillin (AMOXIL) 875 MG tablet    Other Relevant Orders    Streptococcus A Rapid Screen w/Reflex to PCR - Clinic Collect (Completed)         - Strep+, will notify pt by MyChart per our conversation today and start her on amoxicillin  - Related to the incidence at work, I reviewed her work up related to this including 2 office visits and lab work. EKG was completed today and without concerning findings or conduction abnormalities; shows sinus rhythm with sinus arrhythmia. I agree with a follow up visit with neurology for further evaluation and will order a brain MRI to complete prior to this visit. However, in the absence of any abnormalities on the MRI, I do not see any reason that she cannot return to her position and do not feel strongly that she has a neurological issue. I am willing to provide a letter for her to remain on light duty until the MRI is completed but thereafter, she will need to make a decision about switching her position to a job with less physical demands or return to her usual duties as I cannot medically justify otherwise without further evidence. This was reviewed with her in detail today. Additionally, I cannot provide a work excuse for the time that she elected not to return to work even though she was cleared to do so.     I am having Luis Swartz start on amoxicillin. I am also  "having her maintain her albuterol. We will continue to administer triamcinolone and ropivacaine.          Orders Placed This Encounter   Procedures     EKG 12-lead, tracing only   Followup: Return in about 4 weeks (around 6/8/2022) for Follow up and establish care with a PCP . earlier if needed.    42 minutes spent on the date of the encounter doing chart review, review of outside records, interpretation of tests, patient visit, documentation and form completion      Meryl Mitchell, DNP, APRN, CNP       HPI:  Deborah Swartz is a 26 year old year old who presents to the office today for weakness. 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 On 4/19 she had been working  for 2 hours but felt weird, \"a bottle of body wash that was 5lb felt like it was 10 lb\". She had slept 2 hours the night before. As she was walking to the [facility] \"[employer] office\" at her work it felt like her legs got weak underneath of her and she had to sit down. She did not lose consciousness. For 2 hours she felt fatigued. She describes this as a \"whole body fatigue\" but she felt this first in the right knee and then the right shoulder. She reports old injuries of these areas in the past. She has had 4-5 days since 4/19 that she has experienced similar episodes of sudden weakness that has lasted anywhere from 2 hours to the whole day. Episodes are accompanied by feels of dizziness, particularly if she stands quickly, as well as right wrist pain and pain in knuckles. She is asking about light-duty work restrictions because she cannot tolerate standing for the duration of her usual shift which is 10 hours. She has a history of an unexplained seizure by her report when she was a child, it was thought to be related to a medication allergy or reaction by her report. She is scheduled to see neurology in September, this was the soonest that she " could get an appointment. She has been seen several times for these episodes, she was released to return to work without restriction but didn't because she didn't feel that she could physically meet the demands.     Vitals taken on 4/19: temp 99.7.   BP: 102/65, 94/64, 101/58, 103/73  O2 99%  Pulse: 77, 64, 52, 65   Glucose: 82    She has had a sore throat recently. No cough.     She continues to smoke.     Answers for HPI/ROS submitted by the patient on 5/11/2022  How many servings of fruits and vegetables do you eat daily?: 2-3  On average, how many sweetened beverages do you drink each day (Examples: soda, juice, sweet tea, etc.  Do NOT count diet or artificially sweetened beverages)?: 3  How many minutes a day do you exercise enough to make your heart beat faster?: 60 or more  How many days a week do you exercise enough to make your heart beat faster?: 4  How many days per week do you miss taking your medication?: 0  What is the reason for your visit today?: Certificate of fitness, return to work note  When did your symptoms begin?: 1-2 weeks ago  What are your symptoms?: Sudden fatigue(whole body), abnormal vitals, falling and unable to get up on my own(things feel as though they weigh more than they really do), sudden sharp pain in hands, and pain when clasping hands together.  How would you describe these symptoms?: Moderate  Are your symptoms:: Worsening  Have you had these symptoms before?: No  Is there anything that makes you feel worse?: If I don t sleep enough  Is there anything that makes you feel better?: Sleep      Health Maintenance Review  Health Maintenance   Topic Date Due     ANNUAL REVIEW OF HM ORDERS  Never done     ADVANCE CARE PLANNING  Never done     PREVENTIVE CARE VISIT  04/07/2012     HEPATITIS C SCREENING  Never done     HPV IMMUNIZATION (2 - 3-dose series) 04/11/2016     PAP  09/22/2017     ASTHMA ACTION PLAN  03/13/2018     ASTHMA CONTROL TEST  08/12/2018     COVID-19 Vaccine (3 -  "Booster for Pfizer series) 02/10/2022     INFLUENZA VACCINE (Season Ended) 09/01/2022     DTAP/TDAP/TD IMMUNIZATION (7 - Td or Tdap) 12/02/2030     HIV SCREENING  Completed     PHQ-2 (once per calendar year)  Completed     IPV IMMUNIZATION  Completed     HEPATITIS B IMMUNIZATION  Completed     Pneumococcal Vaccine: Pediatrics (0 to 5 Years) and At-Risk Patients (6 to 64 Years)  Aged Out     MENINGITIS IMMUNIZATION  Aged Out       Current Scheduled Meds:  Outpatient Encounter Medications as of 5/11/2022   Medication Sig Dispense Refill     albuterol (PROAIR HFA/PROVENTIL HFA/VENTOLIN HFA) 108 (90 Base) MCG/ACT inhaler Inhale 2 puffs into the lungs every 4 hours as needed for shortness of breath / dyspnea or wheezing 18 g 3     amoxicillin (AMOXIL) 875 MG tablet Take 1 tablet (875 mg) by mouth 2 times daily for 10 days 20 tablet 0     Facility-Administered Encounter Medications as of 5/11/2022   Medication Dose Route Frequency Provider Last Rate Last Admin     ropivacaine (NAROPIN) injection 2 mL  2 mL   Garrett Dixon DO   2 mL at 02/08/22 1654     triamcinolone (KENALOG-40) injection 40 mg  40 mg   Garrett Dixon DO   40 mg at 02/08/22 1654         Objective / Physical Examination:  Vitals:    05/11/22 1111   BP: 106/70   BP Location: Right arm   Patient Position: Sitting   Pulse: 110   Temp: 98.3  F (36.8  C)   TempSrc: Oral   SpO2: 98%   Weight: 79.4 kg (175 lb)   Height: 1.6 m (5' 3\")     Wt Readings from Last 3 Encounters:   05/11/22 79.4 kg (175 lb)   04/22/22 83.2 kg (183 lb 6.4 oz)   04/20/22 82.6 kg (182 lb)     Body mass index is 31 kg/m .     Constitutional: In no apparent distress  ENT: Tympanic membrane clear with landmarks well visualized bilaterally. Septum midline, nares patent, no visible polyps, mucosa moist and without drainage. Lips and mucosa moist. Pharynx is markedly erythematous, no exudate. She has left cervical adenopathy.  Respiratory: Clear to auscultation bilaterally. " Normal inspiratory and expiratory effort  Cardiovascular: Regular rate and rhythm  Neuro:  CN1: grossly intact per patient recall. CN2: No funduscopic exam performed. CN3,4 & 6: Pupillary light response, lateral and vertical gaze normal.  No nystagmus.  Visual fields are full to confrontation. CN5: Intact to touch CN7: No facial weakness, smile, facial symmetry intact. CN8: Intact to spoken voice. CN9&10: Gag reflex, uvula midline, palate rises with phonation. CN11: Shoulder shrug 5/5 intact bilaterally. CN12: Tongue midline and moves freely from side to side.  Psych: Alert and oriented x3. Mood is not anxious or depressed   MSK: upper and lower extremity strength is 5/5 and symmetrical. Normal gait.

## 2022-05-11 NOTE — LETTER
May 11, 2022      Deborah Swartz  [address] [place] [address]        To Whom It May Concern:    Deborah Swartz was seen in our clinic. She is advised that she can return to work effective 5/11/2022 with light duty restrictions (avoid standing longer than 2 hours at a time, no lifting above 20 lbs) through 6/22/22. This restriction is pending further evaluation, any additional restrictions beyond this date will need to come from her primary care provider otherwise may return to normal duties after 6/22/22 or pending normal MRI results, whichever comes first.       Sincerely,        Meryl Mitchell NP

## 2022-05-19 ENCOUNTER — TELEPHONE (OUTPATIENT)
Dept: INTERNAL MEDICINE | Facility: CLINIC | Age: 27
End: 2022-05-19
Payer: COMMERCIAL

## 2022-05-19 NOTE — TELEPHONE ENCOUNTER
Copies made for scan, Meryl, and writer's desk.    LM letting patent know that forms are ready for .    Forms placed in filing drawer at .

## 2022-06-07 ENCOUNTER — OFFICE VISIT (OUTPATIENT)
Dept: FAMILY MEDICINE | Facility: CLINIC | Age: 27
End: 2022-06-07
Payer: COMMERCIAL

## 2022-06-07 VITALS
TEMPERATURE: 97.4 F | HEIGHT: 63 IN | BODY MASS INDEX: 31.71 KG/M2 | OXYGEN SATURATION: 100 % | WEIGHT: 179 LBS | SYSTOLIC BLOOD PRESSURE: 121 MMHG | HEART RATE: 110 BPM | DIASTOLIC BLOOD PRESSURE: 76 MMHG | RESPIRATION RATE: 16 BRPM

## 2022-06-07 DIAGNOSIS — J45.20 MILD INTERMITTENT ASTHMA WITHOUT COMPLICATION: Primary | ICD-10-CM

## 2022-06-07 DIAGNOSIS — F33.0 MILD EPISODE OF RECURRENT MAJOR DEPRESSIVE DISORDER (H): ICD-10-CM

## 2022-06-07 DIAGNOSIS — R42 DIZZINESS: ICD-10-CM

## 2022-06-07 DIAGNOSIS — E53.8 VITAMIN B 12 DEFICIENCY: ICD-10-CM

## 2022-06-07 DIAGNOSIS — Z30.430 ENCOUNTER FOR IUD INSERTION: ICD-10-CM

## 2022-06-07 DIAGNOSIS — E55.9 VITAMIN D DEFICIENCY: ICD-10-CM

## 2022-06-07 LAB
CA-I BLD-MCNC: 4.8 MG/DL (ref 4.4–5.2)
ERYTHROCYTE [DISTWIDTH] IN BLOOD BY AUTOMATED COUNT: 13.2 % (ref 10–15)
HCT VFR BLD AUTO: 48.4 % (ref 35–47)
HGB BLD-MCNC: 15.4 G/DL (ref 11.7–15.7)
MCH RBC QN AUTO: 27.3 PG (ref 26.5–33)
MCHC RBC AUTO-ENTMCNC: 31.8 G/DL (ref 31.5–36.5)
MCV RBC AUTO: 86 FL (ref 78–100)
PLATELET # BLD AUTO: 248 10E3/UL (ref 150–450)
RBC # BLD AUTO: 5.65 10E6/UL (ref 3.8–5.2)
VIT B12 SERPL-MCNC: 745 PG/ML (ref 193–986)
WBC # BLD AUTO: 11.6 10E3/UL (ref 4–11)

## 2022-06-07 PROCEDURE — 82330 ASSAY OF CALCIUM: CPT | Performed by: INTERNAL MEDICINE

## 2022-06-07 PROCEDURE — 84443 ASSAY THYROID STIM HORMONE: CPT | Performed by: INTERNAL MEDICINE

## 2022-06-07 PROCEDURE — 82607 VITAMIN B-12: CPT | Performed by: INTERNAL MEDICINE

## 2022-06-07 PROCEDURE — 85027 COMPLETE CBC AUTOMATED: CPT | Performed by: INTERNAL MEDICINE

## 2022-06-07 PROCEDURE — 36415 COLL VENOUS BLD VENIPUNCTURE: CPT | Performed by: INTERNAL MEDICINE

## 2022-06-07 PROCEDURE — 99214 OFFICE O/P EST MOD 30 MIN: CPT | Performed by: INTERNAL MEDICINE

## 2022-06-07 PROCEDURE — 82306 VITAMIN D 25 HYDROXY: CPT | Performed by: INTERNAL MEDICINE

## 2022-06-07 PROCEDURE — 80048 BASIC METABOLIC PNL TOTAL CA: CPT | Performed by: INTERNAL MEDICINE

## 2022-06-07 ASSESSMENT — ASTHMA QUESTIONNAIRES: ACT_TOTALSCORE: 21

## 2022-06-07 NOTE — PROGRESS NOTES
"  Assessment & Plan     Mild intermittent asthma without complication  Stable. Albuterol as needed.    Mild episode of recurrent major depressive disorder (H)  Stable.     Dizziness  Will check for anemia, hypo/hyperthyroidism, vitamin deficiencies. Discussed about staying hydrated, not starving and getting a good amount of sleep every night.   - CBC with Platelets (Today); Future  - Basic metabolic panel; Future  - TSH; Future  - Ionized Calcium; Future    Encounter for IUD insertion  - Ob/Gyn Referral; Future    Vitamin D deficiency  - Vitamin D Deficiency    Vitamin B 12 deficiency  - Vitamin B12    Tobacco Cessation:   reports that she has been smoking cigarettes. She started smoking about 9 years ago. She has a 0.50 pack-year smoking history. She has quit using smokeless tobacco.  Tobacco Cessation Action Plan: Information offered: Patient not interested at this time    BMI:   Estimated body mass index is 31.71 kg/m  as calculated from the following:    Height as of this encounter: 1.6 m (5' 3\").    Weight as of this encounter: 81.2 kg (179 lb).   Weight management plan: will discuss at next visit    See Patient Instructions    Return in about 1 month (around 7/7/2022) for Follow up, Routine preventive, with me.    DANYELLE LIU MD  [facility] MALCOLM Smith is a 26 year old who presents for the following health issues     HPI     Deborah is here to establish care. She has mild intermittent asthma and major depression - she is not on medication for depression and denies suicidal ideas.   She has hx of post partum depression.   She would like to get IUD for birth control and needs a Gyn referral.   She reports an episode of dizziness and almost fell. Her BP was on the lower side. She was sleep deprived and had not eaten. She reports multiple episodes of dizziness.   She smokes cigarettes intermittently. Either due to stress or socially. Not interested in quitting at this time. "     Review of Systems       Objective    LMP 04/12/2022 (Approximate)   There is no height or weight on file to calculate BMI.  Physical Exam

## 2022-06-08 LAB
ANION GAP SERPL CALCULATED.3IONS-SCNC: 7 MMOL/L (ref 3–14)
BUN SERPL-MCNC: 11 MG/DL (ref 7–30)
CALCIUM SERPL-MCNC: 9.2 MG/DL (ref 8.5–10.1)
CHLORIDE BLD-SCNC: 105 MMOL/L (ref 94–109)
CO2 SERPL-SCNC: 26 MMOL/L (ref 20–32)
CREAT SERPL-MCNC: 0.67 MG/DL (ref 0.52–1.04)
DEPRECATED CALCIDIOL+CALCIFEROL SERPL-MC: 16 UG/L (ref 20–75)
GFR SERPL CREATININE-BSD FRML MDRD: >90 ML/MIN/1.73M2
GLUCOSE BLD-MCNC: 100 MG/DL (ref 70–99)
POTASSIUM BLD-SCNC: 4.1 MMOL/L (ref 3.4–5.3)
SODIUM SERPL-SCNC: 138 MMOL/L (ref 133–144)
TSH SERPL DL<=0.005 MIU/L-ACNC: 0.62 MU/L (ref 0.4–4)

## 2022-06-09 ENCOUNTER — MYC MEDICAL ADVICE (OUTPATIENT)
Dept: FAMILY MEDICINE | Facility: CLINIC | Age: 27
End: 2022-06-09
Payer: COMMERCIAL

## 2022-06-10 NOTE — TELEPHONE ENCOUNTER
See FYI from patient in mychart.    If any action is needed, please route to the appropriate pool (ie. Triage or Team).    Durga Morocho, RN  [facility] Triage Nurse

## 2022-06-14 NOTE — TELEPHONE ENCOUNTER
Form faxed over to [phone] and filed in accordion folder in [employer].    Leopoldo Contreras CMA on 6/14/2022 at 11:18 AM

## 2022-06-22 ENCOUNTER — MYC MEDICAL ADVICE (OUTPATIENT)
Dept: FAMILY MEDICINE | Facility: CLINIC | Age: 27
End: 2022-06-22

## 2022-06-26 ENCOUNTER — MYC MEDICAL ADVICE (OUTPATIENT)
Dept: FAMILY MEDICINE | Facility: CLINIC | Age: 27
End: 2022-06-26

## 2022-06-29 ENCOUNTER — OFFICE VISIT (OUTPATIENT)
Dept: FAMILY MEDICINE | Facility: CLINIC | Age: 27
End: 2022-06-29
Payer: COMMERCIAL

## 2022-06-29 VITALS
BODY MASS INDEX: 33.89 KG/M2 | HEIGHT: 63 IN | HEART RATE: 100 BPM | DIASTOLIC BLOOD PRESSURE: 89 MMHG | WEIGHT: 191.3 LBS | TEMPERATURE: 98.5 F | SYSTOLIC BLOOD PRESSURE: 122 MMHG | RESPIRATION RATE: 18 BRPM | OXYGEN SATURATION: 100 %

## 2022-06-29 DIAGNOSIS — R42 DIZZINESS: ICD-10-CM

## 2022-06-29 DIAGNOSIS — R53.83 FATIGUE, UNSPECIFIED TYPE: ICD-10-CM

## 2022-06-29 DIAGNOSIS — G43.109 MIGRAINE WITH AURA AND WITHOUT STATUS MIGRAINOSUS, NOT INTRACTABLE: Primary | ICD-10-CM

## 2022-06-29 PROBLEM — G43.809 OTHER MIGRAINE WITHOUT STATUS MIGRAINOSUS, NOT INTRACTABLE: Status: ACTIVE | Noted: 2022-06-29

## 2022-06-29 PROCEDURE — 99213 OFFICE O/P EST LOW 20 MIN: CPT | Performed by: INTERNAL MEDICINE

## 2022-06-29 RX ORDER — MECLIZINE HYDROCHLORIDE 25 MG/1
25 TABLET ORAL 3 TIMES DAILY PRN
Qty: 30 TABLET | Refills: 0 | Status: SHIPPED | OUTPATIENT
Start: 2022-06-29 | End: 2022-09-04

## 2022-06-29 ASSESSMENT — PAIN SCALES - GENERAL: PAINLEVEL: MODERATE PAIN (5)

## 2022-06-29 NOTE — PROGRESS NOTES
"  Assessment & Plan     Migraine with aura and without status migrainosus, not intractable  Discussed about treatment of migraine headaches.  Patient says she usually just takes a nap and it helps.   Discussed about proper dosage of nsaids.     Fatigue, unspecified type  No change. Continue vitamin D. Continue to stay hydrated    Dizziness  - meclizine (ANTIVERT) 25 MG tablet; Take 1 tablet (25 mg) by mouth 3 times daily as needed for dizziness      See Patient Instructions    Return in about 6 months (around 12/29/2022) for Follow up, with me.    DANYELLE LIU MD  [facility] MALCOLM Smith is a 26 year old, presenting for the following health issues:  RECHECK (Dizziness, fatigue, seeing spots)    MRI brain scheduled for next week.   Right leg feels weak and right shoulder   Dizziness - with head movement.   Headache - migraines are getting worse now. Migraine with aura.   Patient reports her symptoms are a little better since starting vitamin D     History of Present Illness       Reason for visit:  Unknown condition    She eats 0-1 servings of fruits and vegetables daily.She consumes 3 sweetened beverage(s) daily.She exercises with enough effort to increase her heart rate 60 or more minutes per day.  She exercises with enough effort to increase her heart rate 4 days per week.      Review of Systems       Objective    /89 (BP Location: Right arm, Cuff Size: Adult Regular)   Pulse 100   Temp 98.5  F (36.9  C) (Tympanic)   Resp 18   Ht 1.6 m (5' 3\")   Wt 86.8 kg (191 lb 4.8 oz)   LMP 06/14/2022 (Approximate)   SpO2 100%   BMI 33.89 kg/m    Body mass index is 33.89 kg/m .  Physical Exam               .  ..  "

## 2022-06-29 NOTE — PATIENT INSTRUCTIONS
Migraine headache:  Ibuprofen 600 mg take upto 3 times a day  Naproxen (Aleve) 440 mg  upto 3 times a day     Meclizine 25 mg for dizziness.

## 2022-06-30 ENCOUNTER — OFFICE VISIT (OUTPATIENT)
Dept: ORTHOPEDICS | Facility: CLINIC | Age: 27
End: 2022-06-30
Payer: OTHER MISCELLANEOUS

## 2022-06-30 VITALS
HEIGHT: 63 IN | DIASTOLIC BLOOD PRESSURE: 73 MMHG | WEIGHT: 191 LBS | BODY MASS INDEX: 33.84 KG/M2 | SYSTOLIC BLOOD PRESSURE: 108 MMHG

## 2022-06-30 DIAGNOSIS — M25.511 PAIN IN JOINT OF RIGHT SHOULDER: ICD-10-CM

## 2022-06-30 DIAGNOSIS — M75.51 SUBACROMIAL BURSITIS OF RIGHT SHOULDER JOINT: ICD-10-CM
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
## 2022-06-30 PROCEDURE — 99213 OFFICE O/P EST LOW 20 MIN: CPT | Performed by: FAMILY MEDICINE

## 2022-06-30 NOTE — PROGRESS NOTES
Deborah Swartz  :  1995  DOS: 22  MRN: [id number]    Sports Medicine Clinic Visit    PCP: No primary care provider on file.    Deborah Swartz is a 25 year old Right hand dominant female who is seen in consultation at the request of  Aaron Ewing M.D. presenting with right shoulder injury.    Interim History - Sharon 10, 2021  Since last visit on 2021 patient has minimal right shoulder pain.  She reports mild discomfort over superior shoulder/AC joint with reaching overhead.  Patient is requesting updated work restrictions today - wanting to return most job tasks.  No interim injury.       Interim History - 2021  Since last visit on 6/10/2021 patient has been feeling better. Trial of working without restrictions has been going really well. Has been working on packing at work and she notes that she is feeling better with working then without work. Has been doing in-office and HEP PT. Ibuprofen, stretching and taking an ice break at work has been helping. Driving hurts her right shoulder the most, specifically when she steers counterclockwise and internal shoulder rotation. No interim injury.       Interim History - 2022  Since last visit on 2021 patient has moderate-severe right deep anterior shoulder, glenohumeral joint pain.  She notes that shoulder pain during peak holiday season at her employer.  Currently wearing shoulder spica brace with activity with some relief.  Patient has not completed MRI as discussed.  No interim injury.      Interim History - 2022  Since last visit on 2022 patient has no change in right shoulder pain.  She presents today to review MRI results and discuss work restrictions.  No interim injury.        Interim History - 2022  Since last visit on 2022 patient has mild intermittent right shoulder pain.  Right shoulder subacromial steroid injection completed on 22 provided good relief.  Reports infrequent pain  "with repetitive use while at work, no discomfort at home.  She has recently been out of work due to other health related issues.  No new injury in the interim.    Review of Systems  Musculoskeletal: as above  Remainder of review of systems is negative including constitutional, CV, pulmonary, GI, Skin and Neurologic except as noted in HPI or medical history.    Past Medical History:   Diagnosis Date     Asthma      Depressive disorder N/A    Sometime in highschool     Mental disorder      Papanicolaou smear of cervix with atypical squamous cells of undetermined significance (ASC-US) 2016     Postpartum depression      Past Surgical History:   Procedure Laterality Date      SECTION        SECTION N/A 2021    Procedure:  SECTION;  Surgeon: Del Saini MD;  [facility]: [facility];  Service: Obstetrics     Family History   Problem Relation Age of Onset     Hypertension Mother      GERD Mother      Congenital Anomalies Mother         Spinal Bifada     Anxiety Disorder Mother      Diabetes Type 2  Mother      Depression Father      Asthma Maternal Grandfather      Diabetes Type 2  Paternal Grandmother      Objective  /73   Ht 1.6 m (5' 3\")   Wt 86.6 kg (191 lb)   LMP 2022 (Approximate)   BMI 33.83 kg/m        General: healthy, alert and in no distress      HEENT: no scleral icterus or conjunctival erythema     Skin: no suspicious lesions or rash. No jaundice.     CV: regular rhythm by palpation, 2+ distal pulses, no pedal edema      Resp: normal respiratory effort without conversational dyspnea     Psych: normal mood and affect      Gait: nonantalgic, appropriate coordination and balance     Neuro: normal light touch sensory exam of the extremities. Motor strength as noted below     Right Shoulder exam    ROM:        Full active and passive ROM with flexion, extension, abduction, internal and external rotation, no pain today on the right with terminal flexion, " abduction, ER    Tender:        subacromial space right, minimal       Lateral deltoid minimal       Long head of biceps minimal    Non Tender:       remainder of shoulder       periscapular region    Strength:        abduction 5-/5       internal rotation 5/5       external rotation 5/5       adduction 5/5    Impingement testing:       neg Gastelum       neg empty can, mild/moderate       negcrossover       neg (-) crank    Stability testing:       neg (-) anterior glide       neg (-) sulcus sign    Skin:       no visible deformities       well perfused       capillary refill brisk    Sensation:        normal sensation over shoulder and upper extremity     Radiology  Results for orders placed or performed during the hospital encounter of 01/22/22   MR Shoulder Right w/o Contrast    Narrative    EXAM: MR right shoulder without  contrast 1/24/2022 8:33 AM    TECHNIQUE: Multiplanar, multisequence imaging of the right shoulder  were obtained without administration of intravenous or intra-articular  gadolinium contrast using routine protocol.    History: Shoulder pain, bursitis suspected, xray done; Work related  injury; Subacromial bursitis of right shoulder joint; Biceps  tendonitis on right; Pain in joint of right shoulder    Comparison: 5/13/2021    Findings:    ROTATOR CUFF and ASSOCIATED STRUCTURES  Rotator cuff: Supraspinatus, infraspinatus, teres minor and  subscapularis tendons are intact.     Bursa: No subacromial or subdeltoid bursal fluid.    Musculature: Muscle bulk of rotator cuff is preserved.  Deltoid muscle  bulk is also preserved.  No muscle edema.    Acromioclavicular joint  There is no substantial degenerative changes of the acromioclavicular  joint. Acromion is type 2 in sagittal morphology. Coracoacromial  ligament is not thickened.    OSSEOUS STRUCTURES  No fracture, marrow contusion. Diffuse marrow signal alteration with  mild T1 hypointensity including humeral epiphysis, most consistent  with red  marrow, relatively prominent for age. Nonspecific small  cystlike changes at the posterior humeral head, likely small  intraosseous ganglion cysts.    LONG BICIPITAL TENDON  The long head of the biceps tendon is normally situated within the  bicipital groove. No complete or partial biceps tendon tear is  present.    GLENOHUMERAL JOINT  Joint fluid: Physiologic amount of joint fluid is  present.    Cartilage and subarticular bone:  No focal hyaline cartilage defects  are noted. No Hill-Sachs, reverse Hill-Sachs, or bony Bankart lesions  are seen.    Labrum: Limited assessment on this study with relative lack of joint  distention shows no labral tear.    ANCILLARY FINDINGS:      Impression    Impression:  1. No internal derangement.  2. Relatively prominent red marrow, nonspecific but can be seen in  setting of anemia of chronic disease, obesity, smoking and other  etiologies.    Standardized Safety         SYSTEM ID:  [id number]       Assessment:  1. Work related injury    2. Subacromial bursitis of right shoulder joint    3. Pain in joint of right shoulder        Plan:  Discussed the assessment with the patient.  Follow up: prn only  Sx improved and minimal overall, has been doing well  Letter updated for work, no longer restricted from work activity relative to the shoulder, placed at [employer] without disability designation  HEP options and strategies reviewed  All questions were answered today  Contact us with additional questions or concerns  Signs and sx of concern reviewed      Garrett Dixon DO, EB  Primary Care Sports Medicine  [facility]           Disclaimer: This note consists of symbols derived from keyboarding, dictation and/or voice recognition software. As a result, there may be errors in the script that have gone undetected. Please consider this when interpreting information found in this chart.

## 2022-06-30 NOTE — LETTER
June 30, 2022      Deborah is being followed in my clinic for her right shoulder injury that occurred at work on June 26, 2020.  She has improved overall after her injection in February.  I am recommending ongoing home exercises and activity modification as needed.  She has no formal restrictions for work relative to her shoulder at this point.  She can be placed at [facility] without partial or permanent disability.  Follow up with me will be as needed only.      Garrett Zambrano, , CAQ  Primary Care Sports Medicine  [facility]

## 2022-06-30 NOTE — LETTER
2022         RE: Deborah Swartz  [address] [place] [address]        Dear Colleague,    Thank you for referring your patient, Deborah Swartz, to the [facility] [place]. Please see a copy of my visit note below.    Deborah Swartz  :  1995  DOS: 22  MRN: [id number]    Sports Medicine Clinic Visit    PCP: No primary care provider on file.    Deborah Swartz is a 25 year old Right hand dominant female who is seen in consultation at the request of  Aaron Ewing M.D. presenting with right shoulder injury.    Interim History - Sharon 10, 2021  Since last visit on 2021 patient has minimal right shoulder pain.  She reports mild discomfort over superior shoulder/AC joint with reaching overhead.  Patient is requesting updated work restrictions today - wanting to return most job tasks.  No interim injury.       Interim History - 2021  Since last visit on 6/10/2021 patient has been feeling better. Trial of working without restrictions has been going really well. Has been working on packing at work and she notes that she is feeling better with working then without work. Has been doing in-office and HEP PT. Ibuprofen, stretching and taking an ice break at work has been helping. Driving hurts her right shoulder the most, specifically when she steers counterclockwise and internal shoulder rotation. No interim injury.       Interim History - 2022  Since last visit on 2021 patient has moderate-severe right deep anterior shoulder, glenohumeral joint pain.  She notes that shoulder pain during peak holiday season at her employer.  Currently wearing shoulder spica brace with activity with some relief.  Patient has not completed MRI as discussed.  No interim injury.      Interim History - 2022  Since last visit on 2022 patient has no change in right shoulder pain.  She presents today to review MRI results and discuss work  "restrictions.  No interim injury.        Interim History - 2022  Since last visit on 2022 patient has mild intermittent right shoulder pain.  Right shoulder subacromial steroid injection completed on 22 provided good relief.  Reports infrequent pain with repetitive use while at work, no discomfort at home.  She has recently been out of work due to other health related issues.  No new injury in the interim.    Review of Systems  Musculoskeletal: as above  Remainder of review of systems is negative including constitutional, CV, pulmonary, GI, Skin and Neurologic except as noted in HPI or medical history.    Past Medical History:   Diagnosis Date     Asthma      Depressive disorder N/A    Sometime in [facility]     Mental disorder      Papanicolaou smear of cervix with atypical squamous cells of undetermined significance (ASC-US) 2016     Postpartum depression      Past Surgical History:   Procedure Laterality Date      SECTION        SECTION N/A 2021    Procedure:  SECTION;  Surgeon: Del Saini MD;  Location: [facility];  Service: Obstetrics     Family History   Problem Relation Age of Onset     Hypertension Mother      GERD Mother      Congenital Anomalies Mother         Spinal Bifada     Anxiety Disorder Mother      Diabetes Type 2  Mother      Depression Father      Asthma Maternal Grandfather      Diabetes Type 2  Paternal Grandmother      Objective  /73   Ht 1.6 m (5' 3\")   Wt 86.6 kg (191 lb)   LMP 2022 (Approximate)   BMI 33.83 kg/m        General: healthy, alert and in no distress      HEENT: no scleral icterus or conjunctival erythema     Skin: no suspicious lesions or rash. No jaundice.     CV: regular rhythm by palpation, 2+ distal pulses, no pedal edema      Resp: normal respiratory effort without conversational dyspnea     Psych: normal mood and affect      Gait: nonantalgic, appropriate coordination and balance     Neuro: " normal light touch sensory exam of the extremities. Motor strength as noted below     Right Shoulder exam    ROM:        Full active and passive ROM with flexion, extension, abduction, internal and external rotation, no pain today on the right with terminal flexion, abduction, ER    Tender:        subacromial space right, minimal       Lateral deltoid minimal       Long head of biceps minimal    Non Tender:       remainder of shoulder       periscapular region    Strength:        abduction 5-/5       internal rotation 5/5       external rotation 5/5       adduction 5/5    Impingement testing:       neg Gastelum       neg empty can, mild/moderate       negcrossover       neg (-) crank    Stability testing:       neg (-) anterior glide       neg (-) sulcus sign    Skin:       no visible deformities       well perfused       capillary refill brisk    Sensation:        normal sensation over shoulder and upper extremity     Radiology  Results for orders placed or performed during the hospital encounter of 01/22/22   MR Shoulder Right w/o Contrast    Narrative    EXAM: MR right shoulder without  contrast 1/24/2022 8:33 AM    TECHNIQUE: Multiplanar, multisequence imaging of the right shoulder  were obtained without administration of intravenous or intra-articular  gadolinium contrast using routine protocol.    History: Shoulder pain, bursitis suspected, xray done; Work related  injury; Subacromial bursitis of right shoulder joint; Biceps  tendonitis on right; Pain in joint of right shoulder    Comparison: 5/13/2021    Findings:    ROTATOR CUFF and ASSOCIATED STRUCTURES  Rotator cuff: Supraspinatus, infraspinatus, teres minor and  subscapularis tendons are intact.     Bursa: No subacromial or subdeltoid bursal fluid.    Musculature: Muscle bulk of rotator cuff is preserved.  Deltoid muscle  bulk is also preserved.  No muscle edema.    Acromioclavicular joint  There is no substantial degenerative changes of the  acromioclavicular  joint. Acromion is type 2 in sagittal morphology. Coracoacromial  ligament is not thickened.    OSSEOUS STRUCTURES  No fracture, marrow contusion. Diffuse marrow signal alteration with  mild T1 hypointensity including humeral epiphysis, most consistent  with red marrow, relatively prominent for age. Nonspecific small  cystlike changes at the posterior humeral head, likely small  intraosseous ganglion cysts.    LONG BICIPITAL TENDON  The long head of the biceps tendon is normally situated within the  bicipital groove. No complete or partial biceps tendon tear is  present.    GLENOHUMERAL JOINT  Joint fluid: Physiologic amount of joint fluid is  present.    Cartilage and subarticular bone:  No focal hyaline cartilage defects  are noted. No Hill-Sachs, reverse Hill-Sachs, or bony Bankart lesions  are seen.    Labrum: Limited assessment on this study with relative lack of joint  distention shows no labral tear.    ANCILLARY FINDINGS:      Impression    Impression:  1. No internal derangement.  2. Relatively prominent red marrow, nonspecific but can be seen in  setting of anemia of chronic disease, obesity, smoking and other  etiologies.    [place]         SYSTEM ID:  [id number]       Assessment:  1. Work related injury    2. Subacromial bursitis of right shoulder joint    3. Pain in joint of right shoulder        Plan:  Discussed the assessment with the patient.  Follow up: prn only  Sx improved and minimal overall, has been doing well  Letter updated for work, no longer restricted from work activity relative to the shoulder, placed at [employer] without disability designation  HEP options and strategies reviewed  All questions were answered today  Contact us with additional questions or concerns  Signs and sx of concern reviewed      Garrett Dixon DO, CALENORE  Primary Care Sports Medicine  [facility]           Disclaimer: This note consists of symbols derived from keyboarding,  dictation and/or voice recognition software. As a result, there may be errors in the script that have gone undetected. Please consider this when interpreting information found in this chart.      Again, thank you for allowing me to participate in the care of your patient.        Sincerely,        Garrett Dixon, DO

## 2022-07-07 ENCOUNTER — HOSPITAL ENCOUNTER (OUTPATIENT)
Dept: MRI IMAGING | Facility: HOSPITAL | Age: 27
Discharge: HOME | End: 2022-07-07
Attending: NURSE PRACTITIONER | Admitting: NURSE PRACTITIONER
Payer: COMMERCIAL

## 2022-07-07 ENCOUNTER — MYC MEDICAL ADVICE (OUTPATIENT)
Dept: FAMILY MEDICINE | Facility: CLINIC | Age: 27
End: 2022-07-07

## 2022-07-07 DIAGNOSIS — M62.81 GENERALIZED MUSCLE WEAKNESS: ICD-10-CM

## 2022-07-07 PROCEDURE — 70553 MRI BRAIN STEM W/O & W/DYE: CPT

## 2022-07-07 PROCEDURE — 255N000002 HC RX 255 OP 636: Performed by: NURSE PRACTITIONER

## 2022-07-07 PROCEDURE — A9585 GADOBUTROL INJECTION: HCPCS | Performed by: NURSE PRACTITIONER

## 2022-07-07 RX ORDER — GADOBUTROL 604.72 MG/ML
8 INJECTION INTRAVENOUS ONCE
Status: COMPLETED | OUTPATIENT
Start: 2022-07-07 | End: 2022-07-07

## 2022-07-07 RX ADMIN — GADOBUTROL 8 ML: 604.72 INJECTION INTRAVENOUS at 14:25

## 2022-07-11 NOTE — TELEPHONE ENCOUNTER
Team,  Please address team appropriate mychart.   Or route back if triage questions.     Thank you,  Cornelia Angeles RN

## 2022-07-13 ENCOUNTER — VIRTUAL VISIT (OUTPATIENT)
Dept: FAMILY MEDICINE | Facility: CLINIC | Age: 27
End: 2022-07-13
Payer: COMMERCIAL

## 2022-07-13 DIAGNOSIS — F33.0 MILD EPISODE OF RECURRENT MAJOR DEPRESSIVE DISORDER (H): ICD-10-CM

## 2022-07-13 DIAGNOSIS — R73.9 BLOOD GLUCOSE ELEVATED: ICD-10-CM

## 2022-07-13 DIAGNOSIS — R42 DIZZINESS: Primary | ICD-10-CM

## 2022-07-13 DIAGNOSIS — R53.83 FATIGUE, UNSPECIFIED TYPE: ICD-10-CM

## 2022-07-13 PROCEDURE — 99213 OFFICE O/P EST LOW 20 MIN: CPT | Mod: 95 | Performed by: INTERNAL MEDICINE

## 2022-07-13 ASSESSMENT — PATIENT HEALTH QUESTIONNAIRE - PHQ9
SUM OF ALL RESPONSES TO PHQ QUESTIONS 1-9: 12
10. IF YOU CHECKED OFF ANY PROBLEMS, HOW DIFFICULT HAVE THESE PROBLEMS MADE IT FOR YOU TO DO YOUR WORK, TAKE CARE OF THINGS AT HOME, OR GET ALONG WITH OTHER PEOPLE: SOMEWHAT DIFFICULT
SUM OF ALL RESPONSES TO PHQ QUESTIONS 1-9: 12

## 2022-07-13 NOTE — PROGRESS NOTES
Luis is a 26 year old who is being evaluated via a billable video visit.      How would you like to obtain your AVS? MyChart  If the video visit is dropped, the invitation should be resent by: Text to cell phone: [phone]  Will anyone else be joining your video visit? No      Assessment & Plan     Dizziness  - Adult Rheumatology  Referral; Future    Fatigue, unspecified type  - Adult Rheumatology  Referral; Future    Mild episode of recurrent major depressive disorder (H)  Stable. Denies suicidal ideas. Cont meds.  - Adult Mental Health  Referral; Future    Blood glucose elevated  - Hemoglobin A1c; Future     Depression Screening Follow Up    PHQ 7/13/2022   PHQ-9 Total Score 12   Q9: Thoughts of better off dead/self-harm past 2 weeks Several days   F/U: Thoughts of suicide or self-harm No   F/U: Safety concerns No   No follow-ups on file.    DANYELLE LIU MD  [facility]    Sg Smith is a 26 year old, presenting for the following health issues:  RECHECK (Brain scan) and Forms    Reviewed MRI  Symptoms include: dizziness, migraine, loss of balance, shooting pains in arms and legs.   Will refer to rheumatology for workup.   Refer to mental health for evaluation.    History of Present Illness       Reason for visit:  Paperwork related to current condition, required by work.    She eats 2-3 servings of fruits and vegetables daily.She consumes 3 sweetened beverage(s) daily.She exercises with enough effort to increase her heart rate 60 or more minutes per day.  She exercises with enough effort to increase her heart rate 4 days per week.   She is taking medications regularly.    Today's PHQ-9         PHQ-9 Total Score: 12    PHQ-9 Q9 Thoughts of better off dead/self-harm past 2 weeks :   Several days  Thoughts of suicide or self harm: (P) No  Self-harm Plan:     Self-harm Action:       Safety concerns for self or others: (P) No    How difficult have these  problems made it for you to do your work, take care of things at home, or get along with other people: Somewhat difficult     Review of Systems       Objective    Vitals - Patient Reported  Pain Score: Moderate Pain (5)  Vitals:  No vitals were obtained today due to virtual visit.    Physical Exam       GEN: No acute distress  RESP: No audible increased work of breathing. Patient speaking in full sentences without distress.  PSYCH: pleasant  Exam otherwise limited due to virtual platform    Originating Location (pt. Location): Home    Distant Location (provider location):  [facility]     Platform used for Video Visit: Other: telephone visit    .  ..

## 2022-07-21 ENCOUNTER — MYC MEDICAL ADVICE (OUTPATIENT)
Dept: FAMILY MEDICINE | Facility: CLINIC | Age: 27
End: 2022-07-21

## 2022-07-21 ENCOUNTER — TELEPHONE (OUTPATIENT)
Dept: FAMILY MEDICINE | Facility: CLINIC | Age: 27
End: 2022-07-21

## 2022-07-21 NOTE — TELEPHONE ENCOUNTER
We received a fax from [employer] stating they need patients employee id and case in order to process her paperwork that we faxed to them on 7/13.  Forms are at Essie's desk. Called and LVM for Pt to call with this information.  Please refax forms to [employer] when patient calls back with information

## 2022-07-22 NOTE — TELEPHONE ENCOUNTER
TCs see below - are you able to update and refax over forms?     Mague RAI, Triage RN  [facility] Internal Medicine [facility]

## 2022-07-27 NOTE — TELEPHONE ENCOUNTER
Pt sent [employer] message with information needed.  Form has been updated and faxed to [employer],  Form placed in [employer] accordion

## 2022-08-09 ENCOUNTER — OFFICE VISIT (OUTPATIENT)
Dept: FAMILY MEDICINE | Facility: CLINIC | Age: 27
End: 2022-08-09
Payer: COMMERCIAL

## 2022-08-09 VITALS
TEMPERATURE: 97.7 F | OXYGEN SATURATION: 99 % | HEART RATE: 89 BPM | BODY MASS INDEX: 34.73 KG/M2 | SYSTOLIC BLOOD PRESSURE: 97 MMHG | WEIGHT: 196 LBS | HEIGHT: 63 IN | DIASTOLIC BLOOD PRESSURE: 66 MMHG | RESPIRATION RATE: 16 BRPM

## 2022-08-09 DIAGNOSIS — R53.83 FATIGUE, UNSPECIFIED TYPE: ICD-10-CM

## 2022-08-09 DIAGNOSIS — R42 DIZZINESS: ICD-10-CM

## 2022-08-09 DIAGNOSIS — Z32.00 ENCOUNTER FOR PREGNANCY TEST, RESULT UNKNOWN: ICD-10-CM

## 2022-08-09 DIAGNOSIS — F33.0 MILD EPISODE OF RECURRENT MAJOR DEPRESSIVE DISORDER (H): Primary | ICD-10-CM

## 2022-08-09 DIAGNOSIS — Z12.4 CERVICAL CANCER SCREENING: ICD-10-CM

## 2022-08-09 DIAGNOSIS — B37.31 VAGINAL YEAST INFECTION: ICD-10-CM

## 2022-08-09 DIAGNOSIS — E55.9 VITAMIN D DEFICIENCY: ICD-10-CM

## 2022-08-09 DIAGNOSIS — Z30.430 ENCOUNTER FOR INSERTION OF MIRENA IUD: ICD-10-CM

## 2022-08-09 LAB
CLUE CELLS: PRESENT
HCG SERPL QL: POSITIVE
TRICHOMONAS, WET PREP: ABNORMAL
WBC'S/HIGH POWER FIELD, WET PREP: ABNORMAL
YEAST, WET PREP: ABNORMAL

## 2022-08-09 PROCEDURE — 84703 CHORIONIC GONADOTROPIN ASSAY: CPT | Performed by: INTERNAL MEDICINE

## 2022-08-09 PROCEDURE — 87624 HPV HI-RISK TYP POOLED RSLT: CPT | Performed by: INTERNAL MEDICINE

## 2022-08-09 PROCEDURE — G0145 SCR C/V CYTO,THINLAYER,RESCR: HCPCS | Performed by: INTERNAL MEDICINE

## 2022-08-09 PROCEDURE — 36415 COLL VENOUS BLD VENIPUNCTURE: CPT | Performed by: INTERNAL MEDICINE

## 2022-08-09 PROCEDURE — 87210 SMEAR WET MOUNT SALINE/INK: CPT | Performed by: INTERNAL MEDICINE

## 2022-08-09 PROCEDURE — 99214 OFFICE O/P EST MOD 30 MIN: CPT | Performed by: INTERNAL MEDICINE

## 2022-08-09 RX ORDER — ESCITALOPRAM OXALATE 5 MG/1
5 TABLET ORAL DAILY
Qty: 60 TABLET | Refills: 0 | Status: SHIPPED | OUTPATIENT
Start: 2022-08-09 | End: 2022-09-15 | Stop reason: ALTCHOICE

## 2022-08-09 ASSESSMENT — PAIN SCALES - GENERAL: PAINLEVEL: NO PAIN (0)

## 2022-08-09 NOTE — PROGRESS NOTES
Assessment & Plan     Mild episode of recurrent major depressive disorder (H)  Will start lexapro smallest dose and follow up in 6 weeks.   - escitalopram (LEXAPRO) 5 MG tablet; Take 1 tablet (5 mg) by mouth daily    Fatigue, unspecified type  Stable. Same. No changes     Vitamin D deficiency  On supplements. Continue. Once 12 weeks supply done, will check levels and start a daily supplement.    Dizziness  Same. No changes. Patient reports this could be from stress/depression.    Encounter for insertion of mirena IUD  - Ob/Gyn Referral; Future    Encounter for pregnancy test, result unknown  - HCG qualitative; Future    Cervical cancer screening  - Pap thin layer screen with HPV - recommended age 30 - 65 years    Vaginal yeast infection  - Wet prep - Clinic Collect    See Patient Instructions    No follow-ups on file.    DANYELLE LIU MD  [facility] MALCOLM Smith is a 27 year old, presenting for the following health issues:  No chief complaint on file.    Deborah is here for follow up  She missed her period which she usually does every now and then and wants to check if she is pregnant.   She is due for a pap smear.   She continues to have symptoms of fatigue and dizziness.  She is currently taking vitamin D supplements for deficiency.  She has depression and was previously on medication (zoloft).      History of Present Illness       Reason for visit:  Follow up on unknown condition    She eats 2-3 servings of fruits and vegetables daily.She consumes 3 sweetened beverage(s) daily.She exercises with enough effort to increase her heart rate 60 or more minutes per day.  She exercises with enough effort to increase her heart rate 4 days per week.   She is taking medications regularly.       Review of Systems       Objective    LMP 06/14/2022 (Approximate)   There is no height or weight on file to calculate BMI.  Physical Exam  Genitourinary:     Vagina: Normal.      Cervix: Cervical  bleeding present. No discharge or lesion.                 .  ..

## 2022-08-11 ENCOUNTER — TELEPHONE (OUTPATIENT)
Dept: FAMILY MEDICINE | Facility: CLINIC | Age: 27
End: 2022-08-11

## 2022-08-11 ENCOUNTER — MYC MEDICAL ADVICE (OUTPATIENT)
Dept: FAMILY MEDICINE | Facility: CLINIC | Age: 27
End: 2022-08-11

## 2022-08-11 DIAGNOSIS — N76.0 BV (BACTERIAL VAGINOSIS): ICD-10-CM

## 2022-08-11 DIAGNOSIS — B96.89 BV (BACTERIAL VAGINOSIS): ICD-10-CM

## 2022-08-11 DIAGNOSIS — Z32.01 PREGNANCY TEST POSITIVE: Primary | ICD-10-CM

## 2022-08-11 RX ORDER — METRONIDAZOLE 500 MG/1
500 TABLET ORAL 2 TIMES DAILY
Qty: 4 TABLET | Refills: 0 | Status: SHIPPED | OUTPATIENT
Start: 2022-08-11 | End: 2022-08-13

## 2022-08-12 LAB
BKR LAB AP GYN ADEQUACY: NORMAL
BKR LAB AP GYN INTERPRETATION: NORMAL
BKR LAB AP HPV REFLEX: NORMAL
BKR LAB AP PREVIOUS ABNORMAL: NORMAL
PATH REPORT.COMMENTS IMP SPEC: NORMAL
PATH REPORT.COMMENTS IMP SPEC: NORMAL
PATH REPORT.RELEVANT HX SPEC: NORMAL

## 2022-08-12 NOTE — TELEPHONE ENCOUNTER
RECORDS RECEIVED FROM: Internal   REASON FOR VISIT: Weakness [R53.1]   Date of Appt: 09/21/2022   NOTES (FOR ALL VISITS) STATUS DETAILS   OFFICE NOTE from referring provider Internal 04/22/2022 Dr Michelle Álvarez [facility]    OFFICE NOTE from other specialist Internal 05/11/2022 Dr Mitchell [facility]    DISCHARGE SUMMARY from hospital N/A    DISCHARGE REPORT from the ER N/A    OPERATIVE REPORT N/A    MEDICATION LIST N/A    IMAGING  (FOR ALL VISITS)     EMG N/A    EEG N/A    LUMBAR PUNCTURE N/A    SARMAD SCAN N/A    ULTRASOUND (CAROTID BILAT) *VASCULAR* N/A    MRI (HEAD, NECK, SPINE) Internal 07/07/2022 brain   09/30/2005 brain   CT (HEAD, NECK, SPINE) Internal 09/09/2005 head brain

## 2022-08-15 ENCOUNTER — PRENATAL OFFICE VISIT (OUTPATIENT)
Dept: NURSING | Facility: CLINIC | Age: 27
End: 2022-08-15
Payer: COMMERCIAL

## 2022-08-15 DIAGNOSIS — E55.9 VITAMIN D DEFICIENCY: ICD-10-CM

## 2022-08-15 DIAGNOSIS — O36.80X0 PREGNANCY WITH INCONCLUSIVE FETAL VIABILITY: ICD-10-CM

## 2022-08-15 DIAGNOSIS — O09.91 SUPERVISION OF HIGH RISK PREGNANCY IN FIRST TRIMESTER: ICD-10-CM

## 2022-08-15 DIAGNOSIS — Z13.79 GENETIC SCREENING: Primary | ICD-10-CM

## 2022-08-15 PROBLEM — O09.90 SUPERVISION OF HIGH-RISK PREGNANCY: Status: ACTIVE | Noted: 2022-08-15

## 2022-08-15 PROCEDURE — 99207 PR NO CHARGE NURSE ONLY: CPT

## 2022-08-15 NOTE — PROGRESS NOTES
SUBJECTIVE:     HPI:    This is a 27 year old female patient,  who presents for her first obstetrical visit.    MERRILL: 3/8/2023, by Last Menstrual Period.  She is 10w5d weeks.  Her cycles are irregular-sometimes skips cycles.  Her last menstrual period was normal.   Since her LMP, she has experienced  nausea, fatigue, constipation and dizziness).   She denies emesis, abdominal pain, headache, loss of appetite, vaginal discharge, dysuria, pelvic pain, urinary urgency, urinary frequency, vaginal bleeding and hemorrhoids.    Additional History: -LTCSx2  -hx of preeclampsia w 2nd pregnancy  -vitamin D deficiency - just finishing up Vitamin D 50,000IU; ordered Vit D Level    Have you travelled during the pregnancy?No  Have your sexual partner(s) travelled during the pregnancy?No    HISTORY:   Planned Pregnancy: No  Marital Status:   Occupation: Works for [employer] but currently on medical leave x3 months  Living in Household: Spouse and Children    Past History:  Her past medical history   Past Medical History:   Diagnosis Date     Asthma      Depressive disorder N/A    Sometime in [facility]     Mental disorder      Papanicolaou smear of cervix with atypical squamous cells of undetermined significance (ASC-US) 2016     Postpartum depression    .      She has a history of  preeclampsia and prior  section    Since her last LMP she denies use of alcohol, tobacco and street drugs. STOPPED SMOKING COLD TURKEY W POSITIVE PREGNANCY    Past medical, surgical, social and family history were reviewed and updated in [facility].      Current Outpatient Medications   Medication     escitalopram (LEXAPRO) 5 MG tablet     meclizine (ANTIVERT) 25 MG tablet     Prenatal Vit-Fe Sulfate-FA-DHA (PRENATAL VITAMIN/MIN +DHA PO)     vitamin D3 (CHOLECALCIFEROL) 1.25 MG (88634 UT) capsule     albuterol (PROAIR HFA/PROVENTIL HFA/VENTOLIN HFA) 108 (90 Base) MCG/ACT inhaler     No current facility-administered medications for  this visit.       ROS:   12 point review of systems negative other than symptoms noted below or in the HPI.  Constitutional: Fatigue  Cardiovascular: Lightheadedness  Gastrointestinal: Constipation and Nausea    Nurse phone visit completed. Prenatal book and folder (containing standard educational hand-outs and brochures) will be given at next visit to patient. Information in folder reviewed over the phone. Questions answered. Brochure given on optional screening available to assess chromosomal anomalies. Pt desires NIPS. Pt advised to call the clinic if she has any questions or concerns related to her pregnancy. Prenatal labs future ordered. New prenatal visit scheduled on 8/24/22 with DON Cordero RN      Lab Results   Component Value Date    PAP ASC-US 09/22/2016     PHQ-9 score:    PHQ 8/12/2022   PHQ-9 Total Score 6   Q9: Thoughts of better off dead/self-harm past 2 weeks Several days   F/U: Thoughts of suicide or self-harm No   F/U: Safety concerns No         JASIEL-7 SCORE 3/28/2017 9/18/2020 8/12/2022   Total Score - - 3 (minimal anxiety)   Total Score 1 3 3       Patient supplied answers from flow sheet for:  Prenatal OB Questionnaire.  Past Medical History  Have you ever recieved care for your mental health? : (!) Yes  Have you ever been in a major accident or suffered serious trauma?: No  Within the last year, has anyone hit, slapped, kicked or otherwise hurt you?: No  In the last year, has anyone forced you to have sex when you didn't want to?: No    Past Medical History 2   Have you ever received a blood transfusion?: No  Would you accept a blood transfusion if was medically recommended?: Yes  Does anyone in your home smoke?: (!) Yes   Is your blood type Rh negative?: No  Have you ever ?: (!) Yes  Have you been hospitalized for a nonsurgical reason excluding normal delivery?: No  Have you ever had an abnormal pap smear?: (!) Yes    Past Medical History (Continued)  Do you have a history of  abnormalities of the uterus?: No  Did your mother take AZAR or any other hormones when she was pregnant with you?: Unknown  Do you have any other problems we have not asked about which you feel may be important to this pregnancy?: No

## 2022-08-16 ENCOUNTER — VIRTUAL VISIT (OUTPATIENT)
Dept: FAMILY MEDICINE | Facility: CLINIC | Age: 27
End: 2022-08-16
Payer: COMMERCIAL

## 2022-08-16 DIAGNOSIS — G43.809 OTHER MIGRAINE WITHOUT STATUS MIGRAINOSUS, NOT INTRACTABLE: Primary | ICD-10-CM

## 2022-08-16 LAB
HUMAN PAPILLOMA VIRUS 16 DNA: NEGATIVE
HUMAN PAPILLOMA VIRUS 18 DNA: NEGATIVE
HUMAN PAPILLOMA VIRUS FINAL DIAGNOSIS: NORMAL
HUMAN PAPILLOMA VIRUS OTHER HR: NEGATIVE

## 2022-08-16 PROCEDURE — 99212 OFFICE O/P EST SF 10 MIN: CPT | Mod: 95 | Performed by: INTERNAL MEDICINE

## 2022-08-16 NOTE — PROGRESS NOTES
Luis is a 27 year old who is being evaluated via a billable telephone visit.      What phone number would you like to be contacted at? [phone]  How would you like to obtain your AVS? MyChart    Assessment & Plan     Other migraine without status migrainosus, not intractable  Headaches, fatigue, dizziness and brain fog. Ongoing symptoms. She has appt with neurology.   Previous workup has been negative,   Needs work letter for absence      See Patient Instructions    No follow-ups on file.    DANYELLE LIU MD  [facility]   Luis is a 27 year old, presenting for the following health issues:  No chief complaint on file.    HPI   Note for work needed for leave of absence from 7/17/2022 to 9/13/2022.   This is for symptoms of fatigue, dizziness, hedaches and brain fog.     Review of Systems       Objective       Vitals:  No vitals were obtained today due to virtual visit.    Physical Exam   GEN: No acute distress  RESP: No audible increased work of breathing. Patient speaking in full sentences without distress.  PSYCH: pleasant  Exam otherwise limited due to virtual platform          Phone call duration: 5 minutes    .  ..

## 2022-08-16 NOTE — PROGRESS NOTES
Answers for HPI/ROS submitted by the patient on 2022  If you checked off any problems, how difficult have these problems made it for you to do your work, take care of things at home, or get along with other people?: Somewhat difficult  PHQ9 TOTAL SCORE: 7       SUBJECTIVE:      HPI:     This is a 27 year old female patient,  who presents for her first obstetrical visit.     MERRILL: 3/8/2023, by Last Menstrual Period.  She is 10w5d weeks.  Her cycles are irregular-sometimes skips cycles.  Her last menstrual period was normal.   Since her LMP, she has experienced  nausea, fatigue, constipation and dizziness).   She denies emesis, abdominal pain, headache, loss of appetite, vaginal discharge, dysuria, pelvic pain, urinary urgency, urinary frequency, vaginal bleeding and hemorrhoids.     Additional History: -LTCSx2  -hx of preeclampsia w 2nd pregnancy  -vitamin D deficiency - just finishing up Vitamin D 50,000IU; ordered Vit D Level  Patient had a full annual at another clinic on 22 and pap. US today showed viable IUP measuring 11w 1d,   Patient did have preclampsia with 2nd pregnancy.  Patient states will need a note from physician for work stating she is high risk and having a CSection, will talk with physician at her next visit. She is off of work right now per patient, unrelated to pregnancy per patient.  She is having some nausea, trying to eat frequently.  No drinking enough water.  On prenatal vitamins.  Wanting genetic testing done.       Have you travelled during the pregnancy?No  Have your sexual partner(s) travelled during the pregnancy?No     HISTORY:   Planned Pregnancy: No  Marital Status:   Occupation: Works for [employer] but currently on medical leave x3 months  Living in Household: Spouse and Children     Past History:  Her past medical history   Past Medical History        Past Medical History:   Diagnosis Date     Asthma       Depressive disorder N/A     Sometime in [employer]      Mental disorder       Papanicolaou smear of cervix with atypical squamous cells of undetermined significance (ASC-US) 2016     Postpartum depression        .       She has a history of  preeclampsia and prior  section     Since her last LMP she denies use of alcohol, tobacco and street drugs. STOPPED SMOKING COLD TURKEY W POSITIVE PREGNANCY     Past medical, surgical, social and family history were reviewed and updated in [facility].            Current Outpatient Medications   Medication     escitalopram (LEXAPRO) 5 MG tablet     meclizine (ANTIVERT) 25 MG tablet     Prenatal Vit-Fe Sulfate-FA-DHA (PRENATAL VITAMIN/MIN +DHA PO)     vitamin D3 (CHOLECALCIFEROL) 1.25 MG (18920 UT) capsule     albuterol (PROAIR HFA/PROVENTIL HFA/VENTOLIN HFA) 108 (90 Base) MCG/ACT inhaler      No current facility-administered medications for this visit.         ROS:   12 point review of systems negative other than symptoms noted below or in the HPI.  Constitutional: Fatigue  Cardiovascular: Lightheadedness  Gastrointestinal: Constipation and Nausea     Nurse phone visit completed. Prenatal book and folder (containing standard educational hand-outs and brochures) will be given at next visit to patient. Information in folder reviewed over the phone. Questions answered. Brochure given on optional screening available to assess chromosomal anomalies. Pt desires NIPS. Pt advised to call the clinic if she has any questions or concerns related to her pregnancy. Prenatal labs future ordered. New prenatal visit scheduled on 22 with DON Cordero RN         OBJECTIVE:     EXAM:  /70   Wt 89.3 kg (196 lb 12.8 oz)   LMP 2022 (Approximate)   Breastfeeding No   BMI 34.86 kg/m   Body mass index is 34.86 kg/m .    GENERAL: healthy, alert and no distress  EYES: Eyes grossly normal to inspection, PERRL and conjunctivae and sclerae normal  RESP: lungs clear to auscultation - no rales, rhonchi or wheezes  ABDOMEN: soft,  nontender, no hepatosplenomegaly, no masses and bowel sounds normal  MS: no gross musculoskeletal defects noted, no edema  SKIN: no suspicious lesions or rashes  NEURO: Normal strength and tone, mentation intact and speech normal  PSYCH: mentation appears normal, affect normal/bright  PSYCH: mentation appears normal and affect normal/bright    ASSESSMENT/PLAN:       ICD-10-CM    1. Supervision of high risk pregnancy in first trimester  O09.91 ABO/Rh type and screen     Hepatitis B surface antigen     CBC with platelets     HIV Antigen Antibody Combo     Rubella Antibody IgG Quantitative     Treponema Abs w Reflex to RPR and Titer     Urine Culture Aerobic Bacterial     *UA reflex to Microscopic     Hepatitis C antibody     [facility] Non-Invasive Prenatal Screening     Vitamin D Deficiency   2. Genetic screening  Z13.79 [facility] Non-Invasive Prenatal Screening   3. Vitamin D deficiency  E55.9 Vitamin D Deficiency       27 year old , On 2022 patient is 10w6d weeks of pregnancy with MERRILL of 3/8/2023, by Last Menstrual Period    Discussed as follows:Discussed diet, medication.  Increase water intake.  Denies any bleeding.  Also discussed wants genetic testing, patient wants to know sex of baby and is okay with it going to [facility].      Counseling given:   - Follow up in 4-6 weeks for return OB visit.  - Recommended weight gain for pregnancy: < 15 lbs.         PLAN/PATIENT INSTRUCTIONS:    Return in 2 weeks for OB  visit with physician and to discuss letter needed for work.      NIRAJ Acharya CNP

## 2022-08-16 NOTE — LETTER
[facility]  [address], [address]  [place] [address]  Phone: [phone]    August 16, 2022        Deborah Swartz  [address] [place] [address]          To whom it may concern:    RE: Deborah Cabrera is under my care at [facility]. She needs leave of absence from 7/17/2022 for her symptoms which are being evaluated.   She will return to work on 9/13/2022  Please contact me for questions or concerns.      Sincerely,        DANYELLE LIU MD

## 2022-08-17 ENCOUNTER — PATIENT OUTREACH (OUTPATIENT)
Dept: FAMILY MEDICINE | Facility: CLINIC | Age: 27
End: 2022-08-17

## 2022-08-17 DIAGNOSIS — R87.610 PAPANICOLAOU SMEAR OF CERVIX WITH ATYPICAL SQUAMOUS CELLS OF UNDETERMINED SIGNIFICANCE (ASC-US): ICD-10-CM

## 2022-08-23 LAB
ABO/RH(D): NORMAL
ANTIBODY SCREEN: NEGATIVE
SPECIMEN EXPIRATION DATE: NORMAL

## 2022-08-24 ENCOUNTER — ANCILLARY PROCEDURE (OUTPATIENT)
Dept: ULTRASOUND IMAGING | Facility: CLINIC | Age: 27
End: 2022-08-24
Payer: COMMERCIAL

## 2022-08-24 ENCOUNTER — PRENATAL OFFICE VISIT (OUTPATIENT)
Dept: OBGYN | Facility: CLINIC | Age: 27
End: 2022-08-24
Payer: COMMERCIAL

## 2022-08-24 VITALS — BODY MASS INDEX: 34.86 KG/M2 | SYSTOLIC BLOOD PRESSURE: 108 MMHG | DIASTOLIC BLOOD PRESSURE: 70 MMHG | WEIGHT: 196.8 LBS

## 2022-08-24 DIAGNOSIS — O36.80X0 PREGNANCY WITH INCONCLUSIVE FETAL VIABILITY: ICD-10-CM

## 2022-08-24 DIAGNOSIS — Z13.79 GENETIC SCREENING: ICD-10-CM

## 2022-08-24 DIAGNOSIS — O09.91 SUPERVISION OF HIGH RISK PREGNANCY IN FIRST TRIMESTER: ICD-10-CM

## 2022-08-24 DIAGNOSIS — E55.9 VITAMIN D DEFICIENCY: ICD-10-CM

## 2022-08-24 LAB
ALBUMIN UR-MCNC: NEGATIVE MG/DL
APPEARANCE UR: CLEAR
BILIRUB UR QL STRIP: NEGATIVE
COLOR UR AUTO: YELLOW
ERYTHROCYTE [DISTWIDTH] IN BLOOD BY AUTOMATED COUNT: 13.2 % (ref 10–15)
GLUCOSE UR STRIP-MCNC: NEGATIVE MG/DL
HCT VFR BLD AUTO: 40.6 % (ref 35–47)
HGB BLD-MCNC: 13.2 G/DL (ref 11.7–15.7)
HGB UR QL STRIP: NEGATIVE
KETONES UR STRIP-MCNC: 15 MG/DL
LEUKOCYTE ESTERASE UR QL STRIP: NEGATIVE
MCH RBC QN AUTO: 27.3 PG (ref 26.5–33)
MCHC RBC AUTO-ENTMCNC: 32.5 G/DL (ref 31.5–36.5)
MCV RBC AUTO: 84 FL (ref 78–100)
NITRATE UR QL: NEGATIVE
PH UR STRIP: 5.5 [PH] (ref 5–7)
PLATELET # BLD AUTO: 180 10E3/UL (ref 150–450)
RBC # BLD AUTO: 4.84 10E6/UL (ref 3.8–5.2)
SP GR UR STRIP: >=1.03 (ref 1–1.03)
UROBILINOGEN UR STRIP-ACNC: 1 E.U./DL
WBC # BLD AUTO: 12.6 10E3/UL (ref 4–11)

## 2022-08-24 PROCEDURE — 86803 HEPATITIS C AB TEST: CPT | Performed by: NURSE PRACTITIONER

## 2022-08-24 PROCEDURE — 86780 TREPONEMA PALLIDUM: CPT | Performed by: NURSE PRACTITIONER

## 2022-08-24 PROCEDURE — 86762 RUBELLA ANTIBODY: CPT | Performed by: NURSE PRACTITIONER

## 2022-08-24 PROCEDURE — 86901 BLOOD TYPING SEROLOGIC RH(D): CPT | Performed by: NURSE PRACTITIONER

## 2022-08-24 PROCEDURE — 85027 COMPLETE CBC AUTOMATED: CPT | Performed by: NURSE PRACTITIONER

## 2022-08-24 PROCEDURE — 87340 HEPATITIS B SURFACE AG IA: CPT | Performed by: NURSE PRACTITIONER

## 2022-08-24 PROCEDURE — 87086 URINE CULTURE/COLONY COUNT: CPT | Performed by: NURSE PRACTITIONER

## 2022-08-24 PROCEDURE — 82306 VITAMIN D 25 HYDROXY: CPT | Performed by: NURSE PRACTITIONER

## 2022-08-24 PROCEDURE — 99207 PR FIRST OB VISIT: CPT | Performed by: NURSE PRACTITIONER

## 2022-08-24 PROCEDURE — 36415 COLL VENOUS BLD VENIPUNCTURE: CPT | Performed by: NURSE PRACTITIONER

## 2022-08-24 PROCEDURE — 81003 URINALYSIS AUTO W/O SCOPE: CPT | Performed by: NURSE PRACTITIONER

## 2022-08-24 PROCEDURE — 76817 TRANSVAGINAL US OBSTETRIC: CPT

## 2022-08-24 PROCEDURE — 87389 HIV-1 AG W/HIV-1&-2 AB AG IA: CPT | Performed by: NURSE PRACTITIONER

## 2022-08-24 PROCEDURE — 86900 BLOOD TYPING SEROLOGIC ABO: CPT | Performed by: NURSE PRACTITIONER

## 2022-08-24 PROCEDURE — 86850 RBC ANTIBODY SCREEN: CPT | Performed by: NURSE PRACTITIONER

## 2022-08-24 ASSESSMENT — PATIENT HEALTH QUESTIONNAIRE - PHQ9
10. IF YOU CHECKED OFF ANY PROBLEMS, HOW DIFFICULT HAVE THESE PROBLEMS MADE IT FOR YOU TO DO YOUR WORK, TAKE CARE OF THINGS AT HOME, OR GET ALONG WITH OTHER PEOPLE: SOMEWHAT DIFFICULT
SUM OF ALL RESPONSES TO PHQ QUESTIONS 1-9: 7
SUM OF ALL RESPONSES TO PHQ QUESTIONS 1-9: 7

## 2022-08-25 LAB
DEPRECATED CALCIDIOL+CALCIFEROL SERPL-MC: 109 UG/L (ref 20–75)
HBV SURFACE AG SERPL QL IA: NONREACTIVE
HCV AB SERPL QL IA: NONREACTIVE
HIV 1+2 AB+HIV1 P24 AG SERPL QL IA: NONREACTIVE
RUBV IGG SERPL QL IA: 12.1 INDEX
RUBV IGG SERPL QL IA: POSITIVE
T PALLIDUM AB SER QL: NONREACTIVE

## 2022-08-26 LAB — BACTERIA UR CULT: NORMAL

## 2022-09-02 LAB — SCANNED LAB RESULT: NORMAL

## 2022-09-04 ENCOUNTER — MYC REFILL (OUTPATIENT)
Dept: FAMILY MEDICINE | Facility: CLINIC | Age: 27
End: 2022-09-04

## 2022-09-04 DIAGNOSIS — R42 DIZZINESS: ICD-10-CM

## 2022-09-07 RX ORDER — MECLIZINE HYDROCHLORIDE 25 MG/1
25 TABLET ORAL 3 TIMES DAILY PRN
Qty: 30 TABLET | Refills: 0 | Status: SHIPPED | OUTPATIENT
Start: 2022-09-07 | End: 2022-10-17

## 2022-09-07 NOTE — TELEPHONE ENCOUNTER
8/16/22 VV with PCP.    MD please review refill request and address quantity and refills.    Please refill as appropriate.  Thank you,    Josselin Pierre RN  [facility]

## 2022-09-12 ENCOUNTER — VIRTUAL VISIT (OUTPATIENT)
Dept: OBGYN | Facility: CLINIC | Age: 27
End: 2022-09-12
Payer: COMMERCIAL

## 2022-09-12 DIAGNOSIS — Z82.79 FAMILY HISTORY OF SPINA BIFIDA: ICD-10-CM

## 2022-09-12 DIAGNOSIS — O09.292 HISTORY OF PRE-ECLAMPSIA IN PRIOR PREGNANCY, CURRENTLY PREGNANT, SECOND TRIMESTER: ICD-10-CM

## 2022-09-12 DIAGNOSIS — O09.92 SUPERVISION OF HIGH RISK PREGNANCY IN SECOND TRIMESTER: Primary | ICD-10-CM

## 2022-09-12 DIAGNOSIS — M54.30 SCIATICA, UNSPECIFIED LATERALITY: ICD-10-CM

## 2022-09-12 DIAGNOSIS — O34.219 HISTORY OF CESAREAN SECTION COMPLICATING PREGNANCY: ICD-10-CM

## 2022-09-12 PROCEDURE — 99207 PR PRENATAL VISIT: CPT | Performed by: STUDENT IN AN ORGANIZED HEALTH CARE EDUCATION/TRAINING PROGRAM

## 2022-09-12 RX ORDER — FOLIC ACID 1 MG/1
1 TABLET ORAL DAILY
Qty: 90 TABLET | Refills: 3 | Status: SHIPPED | OUTPATIENT
Start: 2022-09-12 | End: 2023-01-16

## 2022-09-12 NOTE — LETTER
[facility]  [address] [place] [address]  Phone: [phone]  Fax: [phone]    September 12, 2022        Deborah Swartz  [address] [place] [address]          To whom it may concern:    RE: Deborah Swartz    Patient was seen today at our clinic. Due to pregnancy, patient will not be able to work until further evaluation. Her next visit is 9/30/2022. At that time, her condition will be re-evaluated for work ability.     Please contact me for questions or concerns.      Sincerely,          Electronically signed by:  Mary Giles MD

## 2022-09-12 NOTE — PROGRESS NOTES
Deborah Swartz is a 27 year old  at 14w5d by LMP c/w 11w1d US presenting for routine prenatal care via phone visit due to difficulty with  today . Requesting letter to stop working. Works at [employer]. Stands for 10 hours daily, only gets two 30 minutes breaks. Requests that letter states she will be further evaluated for ability to work at her next visit. She also states that she has a history of collapsing and has been off of work while she undergoes evaluation for this with her PCP, this workability is expiring tomorrow. Also, she has sciatica, and has been bed bound due to symptoms. She has not been evaluated for this. Has been seen by PT in the past. Denies LOF, VB, ctx. +FM.    Conditions affecting pregnancy:  - Hx CS x 2  - Hx preE w/o SF   - migraine HA       Objective- see flow sheet    Deborah Swartz is a 27 year old  at 14w5d presenting for routine ob telephone visit.       ICD-10-CM    1. Supervision of high risk pregnancy in second trimester  O09.92 Alpha fetoprotein maternal screen   2. History of  section complicating pregnancy  O34.219    3. History of pre-eclampsia in prior pregnancy, currently pregnant, second trimester  O09.292 AST     ALT     Creatinine     Protein  random urine     Aspirin 81 MG CAPS   4. Family history of spina bifida  Z82.79 folic acid (FOLVITE) 1 MG tablet   5. Sciatica, unspecified laterality  M54.30 Physical Therapy Referral       - Hx preE:    - recommend initiationg ASA- rx provided   - Baseline preE labs at next visit  - Hx of CS x 2: Continue to address delivery planning   - First tri labs wnl. AFP at next visit. Anatomy US- needs to be ordered. S/p COVID-19 vaccine x 2- due for booster. Needs TDAP, influenza.  - Discussed routine precautions. Will need close follow-up to discuss workability in detail. Reviewed that pregnancy itself is not considered a reason to stop working. We consider any co morbidities that would limit her ability to work.  I discussed that I can provide a letter detailing restrictions accommodations that her work must provide. Recommend follow-up in two weeks to discuss this further and complete forms. Meanwhile, letter for time off until evaluation next week is provided.   - Sciatica: PT referral provided  - Pregravid BMI 34. Expect 11-20 lbs.    Follow-up in two weeks for routine ob visit.     Mary Giles MD, MHS  09/12/22

## 2022-09-12 NOTE — LETTER
[facility] [place]  [address] [place] [id number]  Phone: [phone]  Fax: [phone]    September 12, 2022        Deborah Swartz  [address] [place] [address]          To whom it may concern:    RE: Deborah Swartz    Patient was seen today at our clinic. Due to pregnancy, patient will not be able to work until further evaluation. Her next visit is 9/30/2022. At that time, her condition will be re-evaluated for work ability.     Please contact me for questions or concerns.      Sincerely,          Electronically signed by:  Mary Giles MD

## 2022-09-15 ENCOUNTER — VIRTUAL VISIT (OUTPATIENT)
Dept: PSYCHIATRY | Facility: CLINIC | Age: 27
End: 2022-09-15
Attending: INTERNAL MEDICINE
Payer: COMMERCIAL

## 2022-09-15 ENCOUNTER — VIRTUAL VISIT (OUTPATIENT)
Dept: BEHAVIORAL HEALTH | Facility: CLINIC | Age: 27
End: 2022-09-15
Payer: COMMERCIAL

## 2022-09-15 DIAGNOSIS — F33.41 RECURRENT MAJOR DEPRESSIVE DISORDER, IN PARTIAL REMISSION (H): Primary | ICD-10-CM

## 2022-09-15 DIAGNOSIS — F41.1 GENERALIZED ANXIETY DISORDER: ICD-10-CM

## 2022-09-15 DIAGNOSIS — F33.0 MILD EPISODE OF RECURRENT MAJOR DEPRESSIVE DISORDER (H): Primary | ICD-10-CM

## 2022-09-15 PROCEDURE — 99204 OFFICE O/P NEW MOD 45 MIN: CPT | Mod: 95 | Performed by: NURSE PRACTITIONER

## 2022-09-15 PROCEDURE — 90791 PSYCH DIAGNOSTIC EVALUATION: CPT | Mod: 95 | Performed by: COUNSELOR

## 2022-09-15 RX ORDER — SERTRALINE HYDROCHLORIDE 25 MG/1
25 TABLET, FILM COATED ORAL DAILY
Qty: 30 TABLET | Refills: 1 | Status: SHIPPED | OUTPATIENT
Start: 2022-09-15 | End: 2022-10-24

## 2022-09-15 ASSESSMENT — COLUMBIA-SUICIDE SEVERITY RATING SCALE - C-SSRS
TOTAL  NUMBER OF INTERRUPTED ATTEMPTS LIFETIME: NO
2. HAVE YOU ACTUALLY HAD ANY THOUGHTS OF KILLING YOURSELF?: NO
6. HAVE YOU EVER DONE ANYTHING, STARTED TO DO ANYTHING, OR PREPARED TO DO ANYTHING TO END YOUR LIFE?: NO
TOTAL  NUMBER OF ACTUAL ATTEMPTS LIFETIME: 1
1. HAVE YOU WISHED YOU WERE DEAD OR WISHED YOU COULD GO TO SLEEP AND NOT WAKE UP?: YES
LETHALITY/MEDICAL DAMAGE CODE MOST LETHAL ACTUAL ATTEMPT: MODERATE PHYSICAL DAMAGE, MEDICAL ATTENTION NEEDED
3. HAVE YOU BEEN THINKING ABOUT HOW YOU MIGHT KILL YOURSELF?: NO
1. IN THE PAST MONTH, HAVE YOU WISHED YOU WERE DEAD OR WISHED YOU COULD GO TO SLEEP AND NOT WAKE UP?: NO
TOTAL  NUMBER OF ABORTED OR SELF INTERRUPTED ATTEMPTS LIFETIME: NO
ATTEMPT PAST THREE MONTHS: NO
LETHALITY/MEDICAL DAMAGE CODE MOST RECENT POTENTIAL ATTEMPT: BEHAVIOR LIKELY TO RESULT IN INJURY BUT NOT LIKELY TO CAUSE DEATH
5. HAVE YOU STARTED TO WORK OUT OR WORKED OUT THE DETAILS OF HOW TO KILL YOURSELF? DO YOU INTEND TO CARRY OUT THIS PLAN?: YES
REASONS FOR IDEATION LIFETIME: DOES NOT APPLY
5. HAVE YOU STARTED TO WORK OUT OR WORKED OUT THE DETAILS OF HOW TO KILL YOURSELF? DO YOU INTEND TO CARRY OUT THIS PLAN?: NO
2. HAVE YOU ACTUALLY HAD ANY THOUGHTS OF KILLING YOURSELF?: YES
ATTEMPT LIFETIME: YES
4. HAVE YOU HAD THESE THOUGHTS AND HAD SOME INTENTION OF ACTING ON THEM?: NO

## 2022-09-15 ASSESSMENT — PATIENT HEALTH QUESTIONNAIRE - PHQ9
SUM OF ALL RESPONSES TO PHQ QUESTIONS 1-9: 3
10. IF YOU CHECKED OFF ANY PROBLEMS, HOW DIFFICULT HAVE THESE PROBLEMS MADE IT FOR YOU TO DO YOUR WORK, TAKE CARE OF THINGS AT HOME, OR GET ALONG WITH OTHER PEOPLE: SOMEWHAT DIFFICULT
SUM OF ALL RESPONSES TO PHQ QUESTIONS 1-9: 3
10. IF YOU CHECKED OFF ANY PROBLEMS, HOW DIFFICULT HAVE THESE PROBLEMS MADE IT FOR YOU TO DO YOUR WORK, TAKE CARE OF THINGS AT HOME, OR GET ALONG WITH OTHER PEOPLE: SOMEWHAT DIFFICULT
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
## 2022-09-21 ENCOUNTER — OFFICE VISIT (OUTPATIENT)
Dept: NEUROLOGY | Facility: CLINIC | Age: 27
End: 2022-09-21
Attending: PHYSICIAN ASSISTANT
Payer: COMMERCIAL

## 2022-09-21 ENCOUNTER — PRE VISIT (OUTPATIENT)
Dept: NEUROLOGY | Facility: CLINIC | Age: 27
End: 2022-09-21

## 2022-09-21 VITALS
BODY MASS INDEX: 34.42 KG/M2 | HEART RATE: 101 BPM | SYSTOLIC BLOOD PRESSURE: 112 MMHG | DIASTOLIC BLOOD PRESSURE: 73 MMHG | WEIGHT: 194.3 LBS

## 2022-09-21 DIAGNOSIS — R29.90 EPISODE OF TRANSIENT NEUROLOGIC SYMPTOMS: Primary | ICD-10-CM

## 2022-09-21 DIAGNOSIS — R53.1 WEAKNESS: ICD-10-CM

## 2022-09-21 PROCEDURE — 99205 OFFICE O/P NEW HI 60 MIN: CPT | Performed by: INTERNAL MEDICINE
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
## 2022-09-21 NOTE — PATIENT INSTRUCTIONS
We're going to do an EEG to see if there are any brain waves associated with a seizure disorder      Another thing that can episodes of room spinning sensations that last for about 30 seconds is something called BPPV. Pay attention to whether head movements can provoke episodes. If you notice that head movements are frequently provoking the room spinning sensation, we could send you to a physical therapist to do specific treatments for BPPV.
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
## 2022-09-26 ENCOUNTER — MYC MEDICAL ADVICE (OUTPATIENT)
Dept: OBGYN | Facility: CLINIC | Age: 27
End: 2022-09-26

## 2022-09-26 ENCOUNTER — PRENATAL OFFICE VISIT (OUTPATIENT)
Dept: OBGYN | Facility: CLINIC | Age: 27
End: 2022-09-26
Payer: COMMERCIAL

## 2022-09-26 VITALS — SYSTOLIC BLOOD PRESSURE: 108 MMHG | WEIGHT: 195.6 LBS | DIASTOLIC BLOOD PRESSURE: 68 MMHG | BODY MASS INDEX: 34.65 KG/M2

## 2022-09-26 DIAGNOSIS — O09.292 HISTORY OF PRE-ECLAMPSIA IN PRIOR PREGNANCY, CURRENTLY PREGNANT, SECOND TRIMESTER: ICD-10-CM

## 2022-09-26 DIAGNOSIS — R73.9 BLOOD GLUCOSE ELEVATED: ICD-10-CM

## 2022-09-26 DIAGNOSIS — Z23 NEED FOR PROPHYLACTIC VACCINATION AND INOCULATION AGAINST INFLUENZA: ICD-10-CM

## 2022-09-26 DIAGNOSIS — O09.92 SUPERVISION OF HIGH RISK PREGNANCY IN SECOND TRIMESTER: Primary | ICD-10-CM

## 2022-09-26 LAB
ALBUMIN MFR UR ELPH: 8.8 MG/DL
ALT SERPL W P-5'-P-CCNC: 23 U/L (ref 0–50)
AST SERPL W P-5'-P-CCNC: 15 U/L (ref 0–45)
CREAT SERPL-MCNC: 0.43 MG/DL (ref 0.52–1.04)
CREAT UR-MCNC: 111 MG/DL
GFR SERPL CREATININE-BSD FRML MDRD: >90 ML/MIN/1.73M2
HBA1C MFR BLD: 5.6 % (ref 0–5.6)
PROT/CREAT 24H UR: 0.08 MG/MG CR (ref 0–0.2)

## 2022-09-26 PROCEDURE — 36415 COLL VENOUS BLD VENIPUNCTURE: CPT | Performed by: STUDENT IN AN ORGANIZED HEALTH CARE EDUCATION/TRAINING PROGRAM

## 2022-09-26 PROCEDURE — 82565 ASSAY OF CREATININE: CPT | Performed by: STUDENT IN AN ORGANIZED HEALTH CARE EDUCATION/TRAINING PROGRAM

## 2022-09-26 PROCEDURE — 84460 ALANINE AMINO (ALT) (SGPT): CPT | Performed by: STUDENT IN AN ORGANIZED HEALTH CARE EDUCATION/TRAINING PROGRAM

## 2022-09-26 PROCEDURE — 99207 PR PRENATAL VISIT: CPT | Performed by: STUDENT IN AN ORGANIZED HEALTH CARE EDUCATION/TRAINING PROGRAM

## 2022-09-26 PROCEDURE — 87491 CHLMYD TRACH DNA AMP PROBE: CPT | Performed by: STUDENT IN AN ORGANIZED HEALTH CARE EDUCATION/TRAINING PROGRAM

## 2022-09-26 PROCEDURE — 99000 SPECIMEN HANDLING OFFICE-LAB: CPT | Performed by: STUDENT IN AN ORGANIZED HEALTH CARE EDUCATION/TRAINING PROGRAM

## 2022-09-26 PROCEDURE — 90686 IIV4 VACC NO PRSV 0.5 ML IM: CPT | Performed by: STUDENT IN AN ORGANIZED HEALTH CARE EDUCATION/TRAINING PROGRAM

## 2022-09-26 PROCEDURE — 87591 N.GONORRHOEAE DNA AMP PROB: CPT | Performed by: STUDENT IN AN ORGANIZED HEALTH CARE EDUCATION/TRAINING PROGRAM

## 2022-09-26 PROCEDURE — 83036 HEMOGLOBIN GLYCOSYLATED A1C: CPT | Performed by: STUDENT IN AN ORGANIZED HEALTH CARE EDUCATION/TRAINING PROGRAM

## 2022-09-26 PROCEDURE — 90471 IMMUNIZATION ADMIN: CPT | Performed by: STUDENT IN AN ORGANIZED HEALTH CARE EDUCATION/TRAINING PROGRAM

## 2022-09-26 PROCEDURE — 82105 ALPHA-FETOPROTEIN SERUM: CPT | Mod: 90 | Performed by: STUDENT IN AN ORGANIZED HEALTH CARE EDUCATION/TRAINING PROGRAM

## 2022-09-26 PROCEDURE — 84450 TRANSFERASE (AST) (SGOT): CPT | Performed by: STUDENT IN AN ORGANIZED HEALTH CARE EDUCATION/TRAINING PROGRAM

## 2022-09-26 PROCEDURE — 84156 ASSAY OF PROTEIN URINE: CPT | Performed by: STUDENT IN AN ORGANIZED HEALTH CARE EDUCATION/TRAINING PROGRAM

## 2022-09-26 RX ORDER — ASPIRIN 81 MG/1
TABLET, COATED ORAL DAILY
Status: ON HOLD | COMMUNITY
Start: 2022-09-14 | End: 2023-03-04

## 2022-09-26 NOTE — TELEPHONE ENCOUNTER
Routing mychart with attachments to scheduling - FORMS need addressed     Consuelo Mitchell RN on 9/26/2022 at 11:19 AM

## 2022-09-27 LAB
C TRACH DNA SPEC QL NAA+PROBE: NEGATIVE
N GONORRHOEA DNA SPEC QL NAA+PROBE: NEGATIVE

## 2022-09-27 NOTE — TELEPHONE ENCOUNTER
I actually printed them directly from her [employer] message and filled them out with her at her visit. She has a copy. Unfortunately was unable to make a copy of signed papers for scanning. I will shred the docs you set on my desk since it is completed.     Dr. Giles

## 2022-09-28 LAB
# FETUSES US: NORMAL
AFP MOM SERPL: 0.48
AFP SERPL-MCNC: 15 NG/ML
AGE - REPORTED: 27.6 YR
CURRENT SMOKER: NO
FAMILY MEMBER DISEASES HX: YES
GA METHOD: NORMAL
GA: NORMAL WK
IDDM PATIENT QL: NO
INTEGRATED SCN PATIENT-IMP: NORMAL
SPECIMEN DRAWN SERPL: NORMAL

## 2022-10-03 NOTE — TELEPHONE ENCOUNTER
Type of Paperwork received:  Disability & Leave Services     Date Rcvd:  10/3/22    Rcvd From (Company name): [employer]    Provider:  Dr Giles    Placed on Provider Cart Date:  10/3/22    Routing pt rosendohart to Dr Giles - explanation of forms in the message - printed out and placed on desk.    Consuelo Mitchell RN on 10/3/2022 at 8:57 AM

## 2022-10-11 ENCOUNTER — ANCILLARY PROCEDURE (OUTPATIENT)
Dept: NEUROLOGY | Facility: CLINIC | Age: 27
End: 2022-10-11
Attending: INTERNAL MEDICINE
Payer: COMMERCIAL

## 2022-10-11 DIAGNOSIS — R29.90 EPISODE OF TRANSIENT NEUROLOGIC SYMPTOMS: ICD-10-CM

## 2022-10-14 DIAGNOSIS — R42 DIZZINESS: ICD-10-CM

## 2022-10-17 RX ORDER — MECLIZINE HYDROCHLORIDE 25 MG/1
TABLET ORAL
Qty: 30 TABLET | Refills: 0 | Status: SHIPPED | OUTPATIENT
Start: 2022-10-17 | End: 2023-01-10

## 2022-10-17 NOTE — TELEPHONE ENCOUNTER
Routing refill request to provider for review/approval because:  Patient is currently pregnant.     Please review Rx request.     Thank you,  Bambi CHAVEZ, RN      October 17, 2022  2:22 PM

## 2022-10-19 NOTE — PATIENT INSTRUCTIONS
1.  Discontinue Lexapro  2.  Add sertraline 25 mg daily  3.  Talk therapy, when able to, for learning ways to manage life adjustments and stressors    Continue all other medical directions per primary care provider.   Continue all other medications as reviewed per electronic medical record today.   Safety plan reviewed. To the Emergency Department as needed or call after hours crisis line at [phone] or [phone]. [place] Crisis Text Line: Text [place] to 914100  or  Suicide LifeLine Chat: suicideStormpath.org/chat/  To schedule individual or family therapy, call [facility] at [phone].   Schedule an appointment with me in 4 weeks or sooner as needed.  Call [facility] at [phone] to schedule.  Follow up with primary care provider as planned or for acute medical concerns.  Call the psychiatric nurse line with medication questions or concerns at [phone].  [employer] may be used to communicate with your provider, but this is not intended to be used for emergencies.    Crisis Resources:    National Suicide Prevention Lifeline: 620.731.9595 (TTY: 237.484.2484). Call anytime for help.  (www.suicidepreventionlifeline.org)  National [place] on Mental Illness (www.priyank.org): 970.664.2628 or 177-461-0119.   Mental Health Association (www.mentalhealth.org): 547.555.2938 or 287-244-1710.  [place] Crisis Text Line: Text [place] to 655764  Suicide LifeLine Chat: suicideXhaleline.org/chat

## 2022-10-23 ASSESSMENT — ANXIETY QUESTIONNAIRES
1. FEELING NERVOUS, ANXIOUS, OR ON EDGE: NOT AT ALL
IF YOU CHECKED OFF ANY PROBLEMS ON THIS QUESTIONNAIRE, HOW DIFFICULT HAVE THESE PROBLEMS MADE IT FOR YOU TO DO YOUR WORK, TAKE CARE OF THINGS AT HOME, OR GET ALONG WITH OTHER PEOPLE: SOMEWHAT DIFFICULT
GAD7 TOTAL SCORE: 4
GAD7 TOTAL SCORE: 4
7. FEELING AFRAID AS IF SOMETHING AWFUL MIGHT HAPPEN: NOT AT ALL
GAD7 TOTAL SCORE: 4
7. FEELING AFRAID AS IF SOMETHING AWFUL MIGHT HAPPEN: NOT AT ALL
3. WORRYING TOO MUCH ABOUT DIFFERENT THINGS: NOT AT ALL
6. BECOMING EASILY ANNOYED OR IRRITABLE: MORE THAN HALF THE DAYS
5. BEING SO RESTLESS THAT IT IS HARD TO SIT STILL: SEVERAL DAYS
8. IF YOU CHECKED OFF ANY PROBLEMS, HOW DIFFICULT HAVE THESE MADE IT FOR YOU TO DO YOUR WORK, TAKE CARE OF THINGS AT HOME, OR GET ALONG WITH OTHER PEOPLE?: SOMEWHAT DIFFICULT
4. TROUBLE RELAXING: SEVERAL DAYS
2. NOT BEING ABLE TO STOP OR CONTROL WORRYING: NOT AT ALL

## 2022-10-23 ASSESSMENT — PATIENT HEALTH QUESTIONNAIRE - PHQ9
10. IF YOU CHECKED OFF ANY PROBLEMS, HOW DIFFICULT HAVE THESE PROBLEMS MADE IT FOR YOU TO DO YOUR WORK, TAKE CARE OF THINGS AT HOME, OR GET ALONG WITH OTHER PEOPLE: SOMEWHAT DIFFICULT
SUM OF ALL RESPONSES TO PHQ QUESTIONS 1-9: 6
SUM OF ALL RESPONSES TO PHQ QUESTIONS 1-9: 6

## 2022-10-24 ENCOUNTER — VIRTUAL VISIT (OUTPATIENT)
Dept: PSYCHIATRY | Facility: CLINIC | Age: 27
End: 2022-10-24
Payer: COMMERCIAL

## 2022-10-24 DIAGNOSIS — F33.0 MILD EPISODE OF RECURRENT MAJOR DEPRESSIVE DISORDER (H): Primary | ICD-10-CM

## 2022-10-24 DIAGNOSIS — F41.1 GENERALIZED ANXIETY DISORDER: ICD-10-CM

## 2022-10-24 PROCEDURE — 99214 OFFICE O/P EST MOD 30 MIN: CPT | Mod: 95 | Performed by: NURSE PRACTITIONER

## 2022-10-24 RX ORDER — SERTRALINE HYDROCHLORIDE 25 MG/1
25 TABLET, FILM COATED ORAL DAILY
Qty: 30 TABLET | Refills: 3 | Status: SHIPPED | OUTPATIENT
Start: 2022-10-24 | End: 2022-12-19 | Stop reason: DRUGHIGH

## 2022-10-24 NOTE — Clinical Note
Luis is tolerating the 25 mg dose of sertraline well with no reports of side effects.  She tells me she is feeling less irritable and has more patience in stressful situations.  I will see her again in January to discuss dose options for the future, given her history of postpartum depression.  If I can help in any other way until then, please let me know.  Thank you. Nati

## 2022-10-24 NOTE — PROGRESS NOTES
"Luis is a 27 year old who is being evaluated via a billable video visit.      How would you like to obtain your AVS? MyChart  If the video visit is dropped, the invitation should be resent by: Text to cell phone: [phone]  Will anyone else be joining your video visit? No        Video-Visit Details    Video Start Time: 10:48 AM    Type of service:  Video Visit    Video End Time:11:10 AM    Originating Location (pt. Location): Home        Distant Location (provider location):  Off-site    Platform used for Video Visit: [facility]              Outpatient Psychiatric Progress Note    Name: Deborah Swartz   : 1995                    Primary Care Provider: DANYELLE LIU MD   Therapist: None    PHQ-9 scores:  PHQ-9 SCORE 9/15/2022 9/15/2022 10/23/2022   PHQ-9 Total Score - - -   PHQ-9 Total Score MyChart - 3 (Minimal depression) 6 (Mild depression)   PHQ-9 Total Score 3 3 6       JASIEL-7 scores:  JASIEL-7 SCORE 2020 2022 10/23/2022   Total Score - 3 (minimal anxiety) 4 (minimal anxiety)   Total Score 3 3 4       Patient Identification:    Patient is a 27 year old year old,   White Not  or  female  who presents for return visit with me.  Patient is currently on medical leave from [employer]. Patient attended the session with  in the background , who they agreed to have interview with. Patient prefers to be called: \"Luis  \".    Current medications include: folic acid (FOLVITE) 1 MG tablet, Take 1 tablet (1 mg) by mouth daily  meclizine (ANTIVERT) 25 MG tablet, TAKE 1 TABLET(25 MG) BY MOUTH THREE TIMES DAILY AS NEEDED FOR DIZZINESS  Prenatal Vit-Fe Sulfate-FA-DHA (PRENATAL VITAMIN/MIN +DHA PO),   sertraline (ZOLOFT) 25 MG tablet, Take 1 tablet (25 mg) by mouth daily  Aspirin 81 MG CAPS, Take 81 mg by mouth daily for 196 days Take once daily starting at 12 weeks  ASPIRIN LOW DOSE 81 MG EC tablet, TAKE 1 TABLET BY MOUTH DAILY  DAYS. TAKE ONCE DAILY STARTING AT 12 WEEKS (Patient " not taking: Reported on 9/26/2022)    No current facility-administered medications on file prior to visit.       The [place] Prescription Monitoring Program has been reviewed and there are no concerns about diversionary activity for controlled substances at this time.      I was able to review most recent Primary Care Provider, specialty provider, and therapy visit notes that I have access to.     Today, patient reports that she is 14 weeks gestation.  Energy level fluctuates.  She initially went on leave due to seizure like activity.  She is not going to go back to work until after her pregnancy- leave is to be changed to  Due to pregnancy.  Her seizures have not been confirmed.  She has spikes in brain waves  - fell at work.  The last time this happened was a few days ago with weakness and fatigue.    She can get dizzy quickly - usually when standing up and the room spins then she becomes so weak that she cannot move.  She is waiting for results from brain scan.  She still gets emotional but has had no side effects from taking the sertraline.  She gets angry and cries sometimes.  She gets overwhelmed.  She thinks  that the sertraline helps her to be more patient and less irritable.  At this point,Luis desires no changes in the dose of the sertraline and anticipates that it may need to be increased after she has the baby in March.  She tells me she has a history of seizure like activity.  She is being followed by her medical care team for this.  Her last episode she tells me, occurred recently where she felt lightheaded and dizzy.  When she laid down it was hard for her to get up again.  Those episodes are short-lived and pass.     has a past medical history of Asthma, Depressive disorder (N/A), Mental disorder, Papanicolaou smear of cervix with atypical squamous cells of undetermined significance (ASC-US) (09/22/2016), and Postpartum depression.    Social history updates:    She is on paid leave.  It will  last until after pregnancy.  Benefits will stay active.      Substance use updates:    No alcohol use  Tobacco use: No    Vital Signs:   LMP 06/01/2022 (Approximate)     Labs:    Most recent laboratory results reviewed and no new labs.     Mental Status Examination:  Appearance: awake, alert and casual dress  Attitude: cooperative  Eye Contact:  adequate  Gait and Station: No assistive Devices used and No dizziness or falls  Psychomotor Behavior:  intact station, gait and muscle tone  Oriented to:  time, person, and place  Attention Span and Concentration:  Fair  Speech:   clear, coherent and Speaks: English  Mood:  anxious, depressed and better  Affect:  mood congruent  Associations:  no loose associations  Thought Process:  goal oriented  Thought Content:  no evidence of suicidal ideation or homicidal ideation, no auditory hallucinations present and no visual hallucinations present  Recent and Remote Memory:  intact Not formally assessed. No amnesia.  Fund of Knowledge: appropriate  Insight:  good  Judgment:  intact  Impulse Control:  intact    Suicide Risk Assessment:  Today Deborah Swartz reports no thoughts to harm themself or others. In addition, there are notable risk factors for self-harm, including anxiety and comorbid medical condition of Pregnancy with a history of postpartum depression. However, risk is mitigated by commitment to family, history of seeking help when needed, future oriented, denies suicidal intent or plan and denies homicidal ideation, intent, or plan. Therefore, based on all available evidence including the factors cited above, Deborah Swartz does not appear to be at imminent risk for self-harm, does not meet criteria for a 72-hr hold, and therefore remains appropriate for ongoing outpatient level of care.  A thorough assessment of risk factors related to suicide and self-harm have been reviewed and are noted above. The patient convincingly denies suicidality on several occasions. Local  community safety resources printed and reviewed for patient to use if needed. There was no deceit detected, and the patient presented in a manner that was believable.     DSM5 Diagnosis:  296.31 (F33.0) Major Depressive Disorder, Recurrent Episode, Mild _ and With anxious distress  300.02 (F41.1) Generalized Anxiety Disorder    Medical comorbidities include:   Patient Active Problem List    Diagnosis Date Noted     Supervision of high-risk pregnancy 08/15/2022     Priority: Medium     Encounter for insertion of mirena IUD 08/09/2022     Priority: Medium     Encounter for pregnancy test, result unknown 08/09/2022     Priority: Medium     Cervical cancer screening 08/09/2022     Priority: Medium     Vaginal yeast infection 08/09/2022     Priority: Medium     Other migraine without status migrainosus, not intractable 06/29/2022     Priority: Medium     Fatigue, unspecified type 06/29/2022     Priority: Medium     Mild episode of recurrent major depressive disorder (H) 06/07/2022     Priority: Medium     Dizziness 06/07/2022     Priority: Medium     Encounter for IUD insertion 06/07/2022     Priority: Medium     Vitamin D deficiency 06/07/2022     Priority: Medium     Vitamin B 12 deficiency 06/07/2022     Priority: Medium     Tobacco use disorder 03/13/2017     Priority: Medium     Papanicolaou smear of cervix with atypical squamous cells of undetermined significance (ASC-US) 09/22/2016     Priority: Medium     9/22/16: ASCUS Pap. Plan pap in 1 year.   10/30/17 Lost to follow-up for pap tracking  8/9/22 NIL Pap, Neg HPV. Plan pap in 1 year.   8/17/2022 Pt viewed result on MyChart.         Mild intermittent asthma 05/21/2015     Priority: Medium       Assessment:    Deborah CAT Maxime informed me that she is 14 weeks gestation.  She is planning to have her baby around March 14.  At this point she desires no changes in the dose of sertraline.  She reports no side effects from taking it.  She also reports less irritability and  more patience with managing her young family.  She is off of work until after she has the baby.  Talk therapy is urged for her to attend when able to for learning ways to adjust to upcoming life changes.  She is concerned about the return of postpartum depression and I will see her again to talk about adjusting her medication as we go..    Medication side effects and alternatives were reviewed. Health promotion activities recommended and reviewed today. All questions addressed. Education and counseling completed regarding risks and benefits of medications and psychotherapy options.    Treatment Plan:        1.  Continue sertraline 25 mg daily    2.  Follow-up with talk therapy, when able to as you learn to adjust to upcoming life changes    3.  After birth to your son, we can discuss dose adjustments in your sertraline if needed        Continue all other medications as reviewed per electronic medical record today.     Safety plan reviewed. To the Emergency Department as needed or call after hours crisis line at [phone] or [phone]. [place] Crisis Text Line. Text [place] to 828654 or Suicide LifeLine Chat: suicidepreventionlifeline.org/chat/    To schedule individual or family therapy, call [facility] at [phone]    Schedule an appointment with me in new area or sooner as needed. Call [facility] at [phone] to schedule.    Follow up with primary care provider as planned or for acute medical concerns.    Call the psychiatric nurse line with medication questions or concerns at [phone]    MyChart may be used to communicate with your provider, but this is not intended to be used for emergencies.    Crisis Resources:    National Suicide Prevention Lifeline: 799.592.4122 (TTY: 971.143.9317). Call anytime for help.  (www.suicidepreventionlifeline.org)  National [place] on Mental Illness (www.priyank.org): 160.211.5000 or 330-189-9788.   Mental Health Association  (www.mentalhealth.org): 170.628.6491 or 519-782-9469.  [place] Crisis Text [place]: Text [place] to 378292  Suicide LifeLine Chat: suicidepreventionParakeyline.org/chat    Administrative Billing:   Time spent with patient includes counseling and coordination of care regarding above diagnoses and treatment plan.    Patient Status:  Patient will continue to be seen for ongoing consultation and stabilization.    Signed:   MILLER Florence-BC   Psychiatry

## 2022-10-24 NOTE — PATIENT INSTRUCTIONS
1.  Continue sertraline 25 mg daily  2.  Follow-up with talk therapy, when able to as you learn to adjust to upcoming life changes  3.  After birth to your son, we can discuss dose adjustments in your sertraline if needed    Continue all other medications as reviewed per electronic medical record today.   Safety plan reviewed. To the Emergency Department as needed or call after hours crisis line at [phone] or [phone]. [place] Crisis Text [place]. Text [place] to 061778 or Suicide LifeLine Chat: suicideSURF Communication Solutions.org/chat/  To schedule individual or family therapy, call [facility] at [phone]  Schedule an appointment with me in new area or sooner as needed. Call [facility] at [phone] to schedule.  Follow up with primary care provider as planned or for acute medical concerns.  Call the psychiatric nurse line with medication questions or concerns at [phone]  MyChart may be used to communicate with your provider, but this is not intended to be used for emergencies.    Crisis Resources:    National Suicide Prevention Lifeline: 848.824.8428 (TTY: 609.161.3707). Call anytime for help.  (www.suicidepreventionlifeline.org)  National [place] on Mental Illness (www.priyank.org): 666.342.6382 or 278-948-7925.   Mental Health Association (www.mentalhealth.org): 169.728.5823 or 763-326-0261.  [place] Crisis Text [place]: Text [place] to 021107  Suicide LifeLine Chat: suicideSURF Communication Solutions.org/chat

## 2022-11-02 ENCOUNTER — ANCILLARY PROCEDURE (OUTPATIENT)
Dept: ULTRASOUND IMAGING | Facility: CLINIC | Age: 27
End: 2022-11-02
Attending: STUDENT IN AN ORGANIZED HEALTH CARE EDUCATION/TRAINING PROGRAM
Payer: COMMERCIAL

## 2022-11-02 ENCOUNTER — PRENATAL OFFICE VISIT (OUTPATIENT)
Dept: OBGYN | Facility: CLINIC | Age: 27
End: 2022-11-02
Attending: STUDENT IN AN ORGANIZED HEALTH CARE EDUCATION/TRAINING PROGRAM
Payer: COMMERCIAL

## 2022-11-02 VITALS — DIASTOLIC BLOOD PRESSURE: 60 MMHG | WEIGHT: 194 LBS | BODY MASS INDEX: 34.37 KG/M2 | SYSTOLIC BLOOD PRESSURE: 98 MMHG

## 2022-11-02 DIAGNOSIS — R42 DIZZINESS: Primary | ICD-10-CM

## 2022-11-02 DIAGNOSIS — O09.92 SUPERVISION OF HIGH RISK PREGNANCY IN SECOND TRIMESTER: ICD-10-CM

## 2022-11-02 DIAGNOSIS — O44.42 LOW LYING PLACENTA NOS OR WITHOUT HEMORRHAGE, SECOND TRIMESTER: ICD-10-CM

## 2022-11-02 DIAGNOSIS — M62.81 MUSCLE WEAKNESS (GENERALIZED): ICD-10-CM

## 2022-11-02 DIAGNOSIS — O43.112 CIRCUMVALLATE PLACENTA IN SECOND TRIMESTER: ICD-10-CM

## 2022-11-02 PROCEDURE — 99215 OFFICE O/P EST HI 40 MIN: CPT | Performed by: OBSTETRICS & GYNECOLOGY

## 2022-11-02 PROCEDURE — 76805 OB US >/= 14 WKS SNGL FETUS: CPT | Performed by: OBSTETRICS & GYNECOLOGY

## 2022-11-02 NOTE — LETTER
[facility] [place]  [address] [address]  Phone: [phone]  Fax: [phone]    November 2, 2022        Deborah Swartz  [address] [place] [address]          To whom it may concern:    RE: Deborah Swartz    Patient was seen and treated today at our clinic. She is currently 22 weeks gestation. She is currently experiencing some medical complications of uncertain etiology and will need further evaluation before returning to work without restrictions. Her next visit will be with us in 1 month.     Please contact me for questions or concerns.      Sincerely,        Aurelia Marcos Masters, DO

## 2022-11-02 NOTE — Clinical Note
Wallace, I saw Deborah today who had quite a lot going on. Much of it seems to predate pregnancy, but certainly now exacerbated by physiologic changes of pregnancy. Her main issue is dizziness episodes then followed by extreme weakness and then inability to move for up to 2 hours. She has fallen at work even. It appears she has started a work up with Neuro, but seem more needs to be figured out. I wanted to link you both in so that perhaps with some collaboration, more can be done for this young lady.  I have given her a note to be off work for now. She works long overnight shifts with [employer] (also not helpful). I referred her to cardiology as well.   Aurelia Marcos Masters, DO

## 2022-11-02 NOTE — PROGRESS NOTES
OB Visit @ 22w0d     No loss of fluid/vaginal bleeding/regular contractions.     Will get severe dizzy spells that last 30 sec to 2min followed by weakness of her body for up to 2hrs, cant walk. Started about a month before ever had a positive pregnancy test.   Will get racing heart, light sensitivity, seeing spots. One episode got scared, then got dizzy, then had weakness for 20min afterward.   Had 2hr episode at work in June 26th.  Just notes general weakness in her muscles. Things now seem heavier.   Has trouble with muscles on right side, has weakness. Has some prior injuries.   PCP has done some blood testing. Has gone to Neuro for eval 9/22 in [facility]. No epileptic signs. Did a sleep study. Was told has an arrhythmia.   Wants a note regarding inability to work her job at [employer] where she has to stand for 10hrs. States they do not want her to be there, do not make accommodations easily. Passes out easily. Has extreme fatigue. Has almost fallen. Falls asleep while working.   Works nights, 5:30pm to 4am. 40hrs per week. About to increase due to peak season.             ICD-10-CM    1. Dizziness  R42 Adult Cardiology Eval  Referral      2. Muscle weakness (generalized)  M62.81       3. Supervision of high risk pregnancy in second trimester  O09.92       4. Circumvallate placenta in second trimester  O43.112 [facility] Referral - Pregnancy      5. Low lying placenta nos or without hemorrhage, second trimester  O44.42           -Reviewed anatomy ultrasound. Normal anatomy. Circumvallate placenta and LL placenta. Also is seen an irregularity near the cord insert. Will refer to M for further evaluation of the placenta.  Discussed LL placenta likely to resolve. Plan f/u 32wk    -Chronic dizziness, near syncope, subsequent muscular weakness with immobility following the episodes of dizziness.Has seen Neuro, appears to have had nl w/u. However, not clear that there is f/u plan.   Episodes may be  exacerbated by the lower blood pressure of pregnancy, however, they appear to have predated pregnancy, and this would not be typical pregnancy finding. The level of muscular fatigue is excessive for a typical pregnancy. Will loop in patient's PCP to work with Deborah's continuity OB Dr. Giles to determine next steps.   In meantime, will give note for her to be off work while f/u can be determined as she has falling episodes at work and would be unsafe to continue,alden in light of doing night shift.   Rec she stay hydrated, wear compression stockings. Referral to Cardiology to further eval dizziness, reported finding of arrhythmia during w/u with Neuro.    - Labor precautions. F/U 4wk with DR. Giles    (40 minutes was spent on the date of the encounter doing chart review, review and interpretation of pertinent test results, history and/or exam, documentation, coordination of care, patient counseling.)      Aurelia Marcos Masters, DO

## 2022-11-03 ENCOUNTER — TRANSCRIBE ORDERS (OUTPATIENT)
Dept: MATERNAL FETAL MEDICINE | Facility: CLINIC | Age: 27
End: 2022-11-03

## 2022-11-03 DIAGNOSIS — O26.90 PREGNANCY RELATED CONDITION, ANTEPARTUM: Primary | ICD-10-CM

## 2022-11-07 ENCOUNTER — VIRTUAL VISIT (OUTPATIENT)
Dept: FAMILY MEDICINE | Facility: CLINIC | Age: 27
End: 2022-11-07
Payer: COMMERCIAL

## 2022-11-07 DIAGNOSIS — M62.81 MUSCLE WEAKNESS (GENERALIZED): ICD-10-CM

## 2022-11-07 DIAGNOSIS — R42 DIZZINESS: Primary | ICD-10-CM

## 2022-11-07 DIAGNOSIS — O09.92 SUPERVISION OF HIGH RISK PREGNANCY IN SECOND TRIMESTER: Primary | ICD-10-CM

## 2022-11-07 PROCEDURE — 99214 OFFICE O/P EST MOD 30 MIN: CPT | Mod: 95 | Performed by: INTERNAL MEDICINE

## 2022-11-07 NOTE — LETTER
[facility]  [address] [facility], [address]  [place] [facility] [address]  Phone: [phone]    November 7, 2022        Deborah Swartz  [address] [place] [address]          To whom it may concern:    RE: Deborah Cabrera is under my care at [facility].   She is unfit to work due to dizzy spells and muscle weakness.   She can return to work on November 16th 2022.     Please contact me for questions or concerns.      Sincerely,        DANYELLE LIU MD

## 2022-11-07 NOTE — PROGRESS NOTES
Deborah is a 27 year old who is being evaluated via a billable video visit.      How would you like to obtain your AVS? [employer] [place]  If the video visit is dropped, the invitation should be resent by: Other e-mail: [employer]  Will anyone else be joining your video visit? No          Assessment & Plan     Dizziness  - ESR: Erythrocyte sedimentation rate; Future  - CRP, inflammation; Future  - Iron and iron binding capacity; Future  - Ferritin; Future    Muscle weakness (generalized)  - ESR: Erythrocyte sedimentation rate; Future  - CRP, inflammation; Future  - Iron and iron binding capacity; Future  - Ferritin; Future    See Patient Instructions    No follow-ups on file.    DANYELLE LIU MD  [facility] [place]    Subjective   Deborah is a 27 year old, presenting for the following health issues:  Patient Request for Note/Letter    HPI   Deborah is a very pleasant 27 year old who had a video visit today,   She has dizziness and muscle weakness that causes her to fall.   She is 22 weeks pregnant.   The symptoms are ongoing for many months. She was seen by neurology, MRI brain and EEG were within normal limits.   She is going to follow up with cardiology.   Multiple blood test within normal limits/inconclusive.     She is asking for time off from work, she works in [employer].     Review of Systems       Objective       Vitals:  No vitals were obtained today due to virtual visit.    Physical Exam   GEN: No acute distress  RESP: No audible increased work of breathing. Patient speaking in full sentences without distress.  PSYCH: pleasant  Exam otherwise limited due to virtual platform        Video-Visit Details    Video Start Time: 11:40 am    Type of service:  Video Visit    Video End Time:12:01 pm    Originating Location (pt. Location): Home    Distant Location (provider location):  On-site    Platform used for Video Visit: [employer]

## 2022-11-08 ENCOUNTER — TRANSCRIBE ORDERS (OUTPATIENT)
Dept: MATERNAL FETAL MEDICINE | Facility: CLINIC | Age: 27
End: 2022-11-08

## 2022-11-08 DIAGNOSIS — O26.90 PREGNANCY RELATED CONDITION, ANTEPARTUM: Primary | ICD-10-CM

## 2022-11-09 ENCOUNTER — TELEPHONE (OUTPATIENT)
Dept: FAMILY MEDICINE | Facility: CLINIC | Age: 27
End: 2022-11-09

## 2022-11-10 ENCOUNTER — PRE VISIT (OUTPATIENT)
Dept: MATERNAL FETAL MEDICINE | Facility: CLINIC | Age: 27
End: 2022-11-10

## 2022-11-10 NOTE — TELEPHONE ENCOUNTER
Doctor Chris huddled with triage. Provider advised pt schedule lab only visit Echo and heart monitor.     Triage called pt and then had busy signal. Triage called pt and left voice message with cardiology phone number [phone] and advised she schedule a lab only visit.

## 2022-11-11 ENCOUNTER — E-CONSULT (OUTPATIENT)
Dept: PHYSICAL MEDICINE AND REHAB | Facility: CLINIC | Age: 27
End: 2022-11-11

## 2022-11-11 ENCOUNTER — PRENATAL OFFICE VISIT (OUTPATIENT)
Dept: OBGYN | Facility: CLINIC | Age: 27
End: 2022-11-11
Payer: COMMERCIAL

## 2022-11-11 VITALS — DIASTOLIC BLOOD PRESSURE: 60 MMHG | SYSTOLIC BLOOD PRESSURE: 118 MMHG | BODY MASS INDEX: 34.65 KG/M2 | WEIGHT: 195.6 LBS

## 2022-11-11 DIAGNOSIS — O09.92 SUPERVISION OF HIGH RISK PREGNANCY IN SECOND TRIMESTER: Primary | ICD-10-CM

## 2022-11-11 DIAGNOSIS — M54.31 SCIATICA OF RIGHT SIDE: ICD-10-CM

## 2022-11-11 DIAGNOSIS — M62.81 MUSCLE WEAKNESS (GENERALIZED): ICD-10-CM

## 2022-11-11 DIAGNOSIS — R42 DIZZINESS: ICD-10-CM

## 2022-11-11 PROCEDURE — 99451 NTRPROF PH1/NTRNET/EHR 5/>: CPT | Performed by: PHYSICAL MEDICINE & REHABILITATION

## 2022-11-11 PROCEDURE — 99213 OFFICE O/P EST LOW 20 MIN: CPT | Performed by: STUDENT IN AN ORGANIZED HEALTH CARE EDUCATION/TRAINING PROGRAM

## 2022-11-11 NOTE — PROGRESS NOTES
Prenatal Care    Patient reports Sciatica. Painful, barely able to move. Worsening the last few days. Patient also states of feeling weaker and dizziness.  Deborah Swartz is a 27 year old  at 23w2d by LMP c/w 11w1d US presenting for routine prenatal care. Denies LOF, VB, ctx. +FM. Saw PCP for above. Today can't lift arms. Has several major issues to address: sudden onset weakness arms and legs, dizziness, feeling warm. Seems to be episodic weakness, during the visit weakness improved. Has seen neurology for epilepsy, negative EMG. Does not yet have follow-up. Has cardiology follow-up.   Started a new job grooming animals. Seems to be okay standing and doing manual work at this new job. Asked about LE weakness and if she feels safe to drive. Patient states she lives near by and has never had an episode of weakness while driving.     Conditions affecting pregnancy:  - Low lying placenta, circumvallate  - Hx CS x 2  - Hx preE w/o SF    - migraine HA   - BMI 34  - MDD      Objective- see flow sheet  Patient observed ambulating  Gross motor strength is intact. Able to push up off of exam table. Normal ambulation in exam room and when actively observed, noted to have limping.     Deborah Swartz is a 27 year old  at 23w2d presenting for routine ob visit      ICD-10-CM    1. Supervision of high risk pregnancy in second trimester  O09.92       2. Muscle weakness (generalized)  M62.81 Adult E-Consult to Physical Medicine & Rehabilitation (Outpt Provider to Specialist Written Question & Response)     Physical Therapy Referral      3. Dizziness  R42 Adult ENT  Referral      4. Sciatica of right side  M54.31         - Nonspecific neurologic symptoms. Has a myriad of symptoms with no specific triggers, pattern, or consistency. Strength wavered between normal and sudden onset weakness in the matter of minutes, which spontaneously resolves. No suggestion of stroke symptoms. Instructed to follow-up with  neurology. Has cardiology visit scheduled. PM&R, PT consult placed. ENT consult placed for dizziness. Patient denies any acute stressors, states mood is normal. Unclear etiology of symptoms and unlikely to be pregnancy related, as symptoms pre-date pregnancy.     - Hx preE:    - ASA- rx provided   - Baseline preE labs wnl   - Hx of CS x 2: Plan to schedule at 39w. Forms for work signed.  - Depression: on Zoloft, seeing psych   - Sterilization: considering BS. Tubal papers at next visit.   - First tri labs wnl. Anatomy US with low lying placenta and circumvallate. placenta. Follow-up scheduled with MFM. S/p influenza. Declines COVID vax.  - Discussed routine precautions.   - TW. Pregravid BMI 34. Expect 11-20 lbs.    Follow-up in 4 weeks for routine ob visit/28w labs.     Mary Giles MD, MHS  22

## 2022-11-11 NOTE — PROGRESS NOTES
"    11/11/2022     E-Consult has been denied due to: Complexity of question, needs in-person referral.    Interprofessional consultation requested by:  Mary Giles MD      Clinical Question/Purpose: MY CLINICAL QUESTION IS: sciatica     Patient assessment and information reviewed:   Patient has had episodic weakness and other neurological symptoms with no clear etiology at this point. History is limited on her pain related to \"sciatica\".     Recommendations:   --Please encourage patient to follow up with neurology team for more questions regarding her weakness and associated symptoms.    --Please place a \"spine\" referral for more evaluation of \"sciatica\" by the medical spine team     The recommendations provided in this E-Consult are based on a review of clinical data pertinent to the clinical question presented, without a review of the patient's complete medical record or, the benefit of a comprehensive in-person or virtual patient evaluation. This consultation should not replace the clinical judgement and evaluation of the provider ordering this E-Consult. Any new clinical issues, or changes in patient status since the filing of this E-Consult will need to be taken into account when assessing these recommendations. Please contact me if you have further questions.    My total time spent reviewing clinical information and formulating assessment was 10 minutes.        Liana Molina MD  "

## 2022-11-25 ENCOUNTER — OFFICE VISIT (OUTPATIENT)
Dept: CARDIOLOGY | Facility: CLINIC | Age: 27
End: 2022-11-25
Attending: OBSTETRICS & GYNECOLOGY
Payer: COMMERCIAL

## 2022-11-25 VITALS
DIASTOLIC BLOOD PRESSURE: 70 MMHG | BODY MASS INDEX: 35.1 KG/M2 | HEIGHT: 63 IN | WEIGHT: 198.1 LBS | HEART RATE: 96 BPM | SYSTOLIC BLOOD PRESSURE: 112 MMHG

## 2022-11-25 DIAGNOSIS — R42 DIZZINESS: ICD-10-CM

## 2022-11-25 DIAGNOSIS — R00.2 PALPITATIONS: Primary | ICD-10-CM

## 2022-11-25 DIAGNOSIS — M62.81 GENERALIZED MUSCLE WEAKNESS: ICD-10-CM

## 2022-11-25 PROCEDURE — 93000 ELECTROCARDIOGRAM COMPLETE: CPT | Performed by: INTERNAL MEDICINE

## 2022-11-25 PROCEDURE — 99204 OFFICE O/P NEW MOD 45 MIN: CPT | Performed by: INTERNAL MEDICINE

## 2022-11-25 NOTE — PROGRESS NOTES
HPI and Plan:   See dictation    Today's clinic visit entailed:  Review of external notes as documented elsewhere in note    Provider  Link to MDM Help Grid     The level of medical decision making during this visit was of moderate complexity.      Orders Placed This Encounter   Procedures     Follow-Up with Cardiology BEATRIZ     EKG 12-lead complete w/read - Clinics (performed today)     Leadless EKG Monitor 8 to 14 Days     Echocardiogram Complete       No orders of the defined types were placed in this encounter.      There are no discontinued medications.      Encounter Diagnoses   Name Primary?     Dizziness      Palpitations Yes     Generalized muscle weakness        CURRENT MEDICATIONS:  Current Outpatient Medications   Medication Sig Dispense Refill     ASPIRIN LOW DOSE 81 MG EC tablet daily       folic acid (FOLVITE) 1 MG tablet Take 1 tablet (1 mg) by mouth daily 90 tablet 3     meclizine (ANTIVERT) 25 MG tablet TAKE 1 TABLET(25 MG) BY MOUTH THREE TIMES DAILY AS NEEDED FOR DIZZINESS 30 tablet 0     Prenatal Vit-Fe Sulfate-FA-DHA (PRENATAL VITAMIN/MIN +DHA PO)        sertraline (ZOLOFT) 25 MG tablet Take 1 tablet (25 mg) by mouth daily 30 tablet 3       ALLERGIES     Allergies   Allergen Reactions     Cherry      Vomiting and Hives     Fluoxetine      Felt she was shaking inside on 20 mg dose       PAST MEDICAL HISTORY:  Past Medical History:   Diagnosis Date     Asthma      Depressive disorder N/A    Sometime in highschool     Mental disorder      Papanicolaou smear of cervix with atypical squamous cells of undetermined significance (ASC-US) 2016     Postpartum depression        PAST SURGICAL HISTORY:  Past Surgical History:   Procedure Laterality Date      SECTION        SECTION N/A 2021    Procedure:  SECTION;  Surgeon: Del Saini MD;  Location: [facility];  Service: Obstetrics       FAMILY HISTORY:  Family History   Problem Relation Age of Onset      "Hypertension Mother      GERD Mother      Congenital Anomalies Mother         Spinal Bifada     Anxiety Disorder Mother      Diabetes Type 2  Mother      Depression Father      Asthma Maternal Grandfather      Diabetes Type 2  Paternal Grandmother        SOCIAL HISTORY:  Social History     Socioeconomic History     Marital status:      Spouse name: None     Number of children: 2     Years of education: None     Highest education level: None   Tobacco Use     Smoking status: Former     Packs/day: 0.50     Years: 1.00     Pack years: 0.50     Types: Cigarettes     Start date: 2013     Quit date: 2020     Years since quittin.2     Smokeless tobacco: Former     Tobacco comments:     Smoked for 5years, quit cold turkey for both pregnancies, picked it up again upon returning to work.   Vaping Use     Vaping Use: Never used   Substance and Sexual Activity     Alcohol use: Not Currently     Comment: none     Drug use: No     Sexual activity: Yes     Partners: Male     Birth control/protection: None   Other Topics Concern     Parent/sibling w/ CABG, MI or angioplasty before 65F 55M? No       Review of Systems:  Skin:          Eyes:         ENT:         Respiratory:  Negative       Cardiovascular:  Negative;chest pain;cyanosis Positive for;palpitations;heaviness;dizziness;edema;fatigue weakness in LE,  Gastroenterology: Positive for nausea    Genitourinary:         Musculoskeletal:         Neurologic:         Psychiatric:         Heme/Lymph/Imm:         Endocrine:           Physical Exam:  Vitals: /70   Pulse 96   Ht 1.6 m (5' 3\")   Wt 89.9 kg (198 lb 1.6 oz)   LMP 2022 (Approximate)   BMI 35.09 kg/m      Constitutional:  cooperative;in no acute distress        Skin:  warm and dry to the touch          Head:  normocephalic        Eyes:  pupils equal and round        Lymph:      ENT:  no pallor or cyanosis        Neck:  no carotid bruit        Respiratory:  clear to auscultation;normal " symmetry         Cardiac: regular rhythm;no murmurs, gallops or rubs detected                pulses full and equal                                        GI:  abdomen soft;no bruits        Extremities and Muscular Skeletal:  no deformities, clubbing, cyanosis, erythema observed;no edema              Neurological:  no gross motor deficits;affect appropriate        Psych:  Alert and Oriented x 3        CC  Aurelia Marcos Masters, DO  [address]  [place] [address]

## 2022-11-25 NOTE — LETTER
2022    DANYELLE LIU MD  [address] [place] [address]    RE: Deborah CAT Black       Dear Colleague,     I had the pleasure of seeing Deborah CAT Black in the [facility].  HPI and Plan:   See dictation    Today's clinic visit entailed:  Review of external notes as documented elsewhere in note    Provider  Link to MDM Help Grid     The level of medical decision making during this visit was of moderate complexity.      Orders Placed This Encounter   Procedures     Follow-Up with Cardiology BEATRIZ     EKG 12-lead complete w/read - Clinics (performed today)     Leadless EKG Monitor 8 to 14 Days     Echocardiogram Complete       No orders of the defined types were placed in this encounter.      There are no discontinued medications.      Encounter Diagnoses   Name Primary?     Dizziness      Palpitations Yes     Generalized muscle weakness        CURRENT MEDICATIONS:  Current Outpatient Medications   Medication Sig Dispense Refill     ASPIRIN LOW DOSE 81 MG EC tablet daily       folic acid (FOLVITE) 1 MG tablet Take 1 tablet (1 mg) by mouth daily 90 tablet 3     meclizine (ANTIVERT) 25 MG tablet TAKE 1 TABLET(25 MG) BY MOUTH THREE TIMES DAILY AS NEEDED FOR DIZZINESS 30 tablet 0     Prenatal Vit-Fe Sulfate-FA-DHA (PRENATAL VITAMIN/MIN +DHA PO)        sertraline (ZOLOFT) 25 MG tablet Take 1 tablet (25 mg) by mouth daily 30 tablet 3       ALLERGIES     Allergies   Allergen Reactions     Cherry      Vomiting and Hives     Fluoxetine      Felt she was shaking inside on 20 mg dose       PAST MEDICAL HISTORY:  Past Medical History:   Diagnosis Date     Asthma      Depressive disorder N/A    Sometime in [facility]     Mental disorder      Papanicolaou smear of cervix with atypical squamous cells of undetermined significance (ASC-US) 2016     Postpartum depression        PAST SURGICAL HISTORY:  Past Surgical History:   Procedure Laterality Date      SECTION        SECTION N/A  "2021    Procedure:  SECTION;  Surgeon: Del Saini MD;  Location: [facility];  Service: Obstetrics       FAMILY HISTORY:  Family History   Problem Relation Age of Onset     Hypertension Mother      GERD Mother      Congenital Anomalies Mother         Spinal Bifada     Anxiety Disorder Mother      Diabetes Type 2  Mother      Depression Father      Asthma Maternal Grandfather      Diabetes Type 2  Paternal Grandmother        SOCIAL HISTORY:  Social History     Socioeconomic History     Marital status:      Spouse name: None     Number of children: 2     Years of education: None     Highest education level: None   Tobacco Use     Smoking status: Former     Packs/day: 0.50     Years: 1.00     Pack years: 0.50     Types: Cigarettes     Start date: 2013     Quit date: 2020     Years since quittin.2     Smokeless tobacco: Former     Tobacco comments:     Smoked for 5years, quit cold turkey for both pregnancies, picked it up again upon returning to work.   Vaping Use     Vaping Use: Never used   Substance and Sexual Activity     Alcohol use: Not Currently     Comment: none     Drug use: No     Sexual activity: Yes     Partners: Male     Birth control/protection: None   Other Topics Concern     Parent/sibling w/ CABG, MI or angioplasty before 65F 55M? No       Review of Systems:  Skin:          Eyes:         ENT:         Respiratory:  Negative       Cardiovascular:  Negative;chest pain;cyanosis Positive for;palpitations;heaviness;dizziness;edema;fatigue weakness in LE,  Gastroenterology: Positive for nausea    Genitourinary:         Musculoskeletal:         Neurologic:         Psychiatric:         Heme/Lymph/Imm:         Endocrine:           Physical Exam:  Vitals: /70   Pulse 96   Ht 1.6 m (5' 3\")   Wt 89.9 kg (198 lb 1.6 oz)   LMP 2022 (Approximate)   BMI 35.09 kg/m      Constitutional:  cooperative;in no acute distress        Skin:  warm and dry to the touch    "       Head:  normocephalic        Eyes:  pupils equal and round        Lymph:      ENT:  no pallor or cyanosis        Neck:  no carotid bruit        Respiratory:  clear to auscultation;normal symmetry         Cardiac: regular rhythm;no murmurs, gallops or rubs detected                pulses full and equal                                        GI:  abdomen soft;no bruits        Extremities and Muscular Skeletal:  no deformities, clubbing, cyanosis, erythema observed;no edema              Neurological:  no gross motor deficits;affect appropriate        Psych:  Alert and Oriented x 3        CC  Aurelia Olivers, DO  [address] [facility] [address]  [place] [address]                Service Date: 11/25/2022    CLINIC NEW PATIENT VISIT     REFERRING PROVIDER:  Katie Perez MD     HISTORY OF PRESENT ILLNESS:  Ms. Swartz is a pleasant, 27-year-old female around 23 weeks' gestation with her 3rd pregnancy.  She was referred to our office today for concerns about a heart rhythm issue.  She has been experiencing some odd symptoms.  She describes dizziness, which is room-spinning dizziness, light sensitivity and generalized weakness.  She has also noted her heart racing at times.  She has no previous heart history. No family history that she is aware of.  She is a tobacco user, but has quit since she found out she was pregnant.  No alcohol, caffeine or recreational drug use.  I did review her notes from her prenatal care, and she was also, it looks like, referred to Neurology for an EEG to look for epilepsy or seizure activity, and according to her, this was negative.  I reviewed recent lab work she had done in September, including a basic metabolic panel, which looks normal, hemoglobin A1c is normal, liver function tests are normal, CBC is normal, and thyroid testing in June is normal.  We did perform an electrocardiogram in office today, which I reviewed demonstrating a normal sinus rhythm and essentially a normal EKG.  QT  corrected interval is normal at 394 msec.    PHYSICAL EXAMINATION:  Today her blood pressure is 112/70, pulse is 96, and weight is 198.  Cardiovascular tones today are regular.  I do not appreciate a murmur, gallop or rub.  Lung fields are clear.  She has no carotid bruit, and she has no peripheral edema.  She is 23 weeks' gestation.    In summary, Ms. Najera is a pleasant, 27-year-old female with ongoing symptoms of dizziness, which are suggestive of vertigo, generalized muscle weakness and palpitations with a fast heart rhythm.  Her primary had ordered an echocardiogram and a heart monitor, but she has yet to schedule or complete that.  I agree with both, so we will have her schedule an echocardiogram and a heart monitor and a followup visit to go over those test results once complete.    Please feel free to contact me with any questions you have in regard to her care.    cc:  [place] [address]    Deloris Moss DO        D: 2022   T: 2022   MT: joe    Name:     JULITO NAJERA  MRN:      [id number]        Account:      [id number]   :      1995           Service Date: 2022       Document: [id number]      Thank you for allowing me to participate in the care of your patient.      Sincerely,     Deloris Moss DO     [facility]  cc:   Aurelia Olivers, [place] [phone]

## 2022-11-25 NOTE — PROGRESS NOTES
Service Date: 11/25/2022    CLINIC NEW PATIENT VISIT     REFERRING PROVIDER:  Katie Perez MD     HISTORY OF PRESENT ILLNESS:  Ms. Swartz is a pleasant, 27-year-old female around 23 weeks' gestation with her 3rd pregnancy.  She was referred to our office today for concerns about a heart rhythm issue.  She has been experiencing some odd symptoms.  She describes dizziness, which is room-spinning dizziness, light sensitivity and generalized weakness.  She has also noted her heart racing at times.  She has no previous heart history. No family history that she is aware of.  She is a tobacco user, but has quit since she found out she was pregnant.  No alcohol, caffeine or recreational drug use.  I did review her notes from her prenatal care, and she was also, it looks like, referred to Neurology for an EEG to look for epilepsy or seizure activity, and according to her, this was negative.  I reviewed recent lab work she had done in September, including a basic metabolic panel, which looks normal, hemoglobin A1c is normal, liver function tests are normal, CBC is normal, and thyroid testing in June is normal.  We did perform an electrocardiogram in office today, which I reviewed demonstrating a normal sinus rhythm and essentially a normal EKG.  QT corrected interval is normal at 394 msec.    PHYSICAL EXAMINATION:  Today her blood pressure is 112/70, pulse is 96, and weight is 198.  Cardiovascular tones today are regular.  I do not appreciate a murmur, gallop or rub.  Lung fields are clear.  She has no carotid bruit, and she has no peripheral edema.  She is 23 weeks' gestation.    In summary, Ms. Swartz is a pleasant, 27-year-old female with ongoing symptoms of dizziness, which are suggestive of vertigo, generalized muscle weakness and palpitations with a fast heart rhythm.  Her primary had ordered an echocardiogram and a heart monitor, but she has yet to schedule or complete that.  I agree with both, so we will have her  schedule an echocardiogram and a heart monitor and a followup visit to go over those test results once complete.    Please feel free to contact me with any questions you have in regard to her care.    cc:  [place] [address]    Deloris Moss DO        D: 2022   T: 2022   MT: joe    Name:     JULITO NAJERA  MRN:      [id number]        Account:      [id number]   :      1995           Service Date: 2022       Document: [id number]

## 2022-11-26 ENCOUNTER — LAB (OUTPATIENT)
Dept: LAB | Facility: CLINIC | Age: 27
End: 2022-11-26
Payer: COMMERCIAL

## 2022-11-26 DIAGNOSIS — R42 DIZZINESS: ICD-10-CM

## 2022-11-26 DIAGNOSIS — M62.81 MUSCLE WEAKNESS (GENERALIZED): ICD-10-CM

## 2022-11-26 LAB
CRP SERPL-MCNC: 16 MG/L
ERYTHROCYTE [SEDIMENTATION RATE] IN BLOOD BY WESTERGREN METHOD: 36 MM/HR (ref 0–20)
FERRITIN SERPL-MCNC: 11 NG/ML (ref 6–175)
IRON BINDING CAPACITY (ROCHE): 571 UG/DL (ref 240–430)
IRON SATN MFR SERPL: 6 % (ref 15–46)
IRON SERPL-MCNC: 36 UG/DL (ref 37–145)

## 2022-11-26 PROCEDURE — 36415 COLL VENOUS BLD VENIPUNCTURE: CPT

## 2022-11-26 PROCEDURE — 83540 ASSAY OF IRON: CPT

## 2022-11-26 PROCEDURE — 85652 RBC SED RATE AUTOMATED: CPT

## 2022-11-26 PROCEDURE — 83550 IRON BINDING TEST: CPT

## 2022-11-26 PROCEDURE — 82728 ASSAY OF FERRITIN: CPT

## 2022-11-26 PROCEDURE — 86140 C-REACTIVE PROTEIN: CPT

## 2022-11-29 ENCOUNTER — TELEPHONE (OUTPATIENT)
Dept: FAMILY MEDICINE | Facility: CLINIC | Age: 27
End: 2022-11-29

## 2022-11-29 DIAGNOSIS — R42 DIZZINESS: Primary | ICD-10-CM

## 2022-11-29 RX ORDER — EPINEPHRINE 1 MG/ML
0.3 INJECTION, SOLUTION, CONCENTRATE INTRAVENOUS EVERY 5 MIN PRN
Status: CANCELLED | OUTPATIENT
Start: 2022-11-29

## 2022-11-29 RX ORDER — DIPHENHYDRAMINE HYDROCHLORIDE 50 MG/ML
50 INJECTION INTRAMUSCULAR; INTRAVENOUS
Status: CANCELLED
Start: 2022-11-29

## 2022-11-29 RX ORDER — HEPARIN SODIUM (PORCINE) LOCK FLUSH IV SOLN 100 UNIT/ML 100 UNIT/ML
5 SOLUTION INTRAVENOUS
Status: CANCELLED | OUTPATIENT
Start: 2022-11-29

## 2022-11-29 RX ORDER — ALBUTEROL SULFATE 90 UG/1
1-2 AEROSOL, METERED RESPIRATORY (INHALATION)
Status: CANCELLED
Start: 2022-11-29

## 2022-11-29 RX ORDER — MEPERIDINE HYDROCHLORIDE 25 MG/ML
25 INJECTION INTRAMUSCULAR; INTRAVENOUS; SUBCUTANEOUS EVERY 30 MIN PRN
Status: CANCELLED | OUTPATIENT
Start: 2022-11-29

## 2022-11-29 RX ORDER — METHYLPREDNISOLONE SODIUM SUCCINATE 125 MG/2ML
125 INJECTION, POWDER, LYOPHILIZED, FOR SOLUTION INTRAMUSCULAR; INTRAVENOUS
Status: CANCELLED
Start: 2022-11-29

## 2022-11-29 RX ORDER — ALBUTEROL SULFATE 0.83 MG/ML
2.5 SOLUTION RESPIRATORY (INHALATION)
Status: CANCELLED | OUTPATIENT
Start: 2022-11-29

## 2022-11-29 RX ORDER — HEPARIN SODIUM,PORCINE 10 UNIT/ML
5 VIAL (ML) INTRAVENOUS
Status: CANCELLED | OUTPATIENT
Start: 2022-11-29

## 2022-11-30 NOTE — TELEPHONE ENCOUNTER
Please call patient for test results.   She has very low iron levels, I recommend iron infusions, confirmed with her Ob and she supports this.   IV iron ordered.     Other labs are elevated like ESR and CRP, these are non specific. Still waiting to see echo results. Please encourage her to schedule the test.

## 2022-12-02 ENCOUNTER — MYC MEDICAL ADVICE (OUTPATIENT)
Dept: PSYCHIATRY | Facility: CLINIC | Age: 27
End: 2022-12-02

## 2022-12-06 NOTE — TELEPHONE ENCOUNTER
Called patient and will also Mychart her for more information. Voicemail left.    Leigh Rhodes RN on 12/6/2022 at 11:29 AM

## 2022-12-06 NOTE — TELEPHONE ENCOUNTER
Patient Contact    Attempt # 2    Was call answered?  No.  Left message on voicemail with information to call back.    Also sent pt a [employer] message with provider response below and scheduling numbers     Mague RAI, Triage RN  [facility]

## 2022-12-06 NOTE — TELEPHONE ENCOUNTER
Patient Mychart message states she is wondering about increasing the sertraline. She has been more irritable and agitated lately. The littlest things are setting her off. She is pregnant with baby due in March so she thinks that the hormones are making her more irritable.(See Mychart message.) Next appt 12/19/22    10/24/22 Visit:    She thinks  that the sertraline helps her to be more patient and less irritable.  At this point,Luis desires no changes in the dose of the sertraline and anticipates that it may need to be increased after she has the baby in March. Continue sertraline 25 mg daily    2.  Follow-up with talk therapy, when able to as you learn to adjust to upcoming life changes        3.  After birth to your son, we can discuss dose adjustments in your sertraline if needed

## 2022-12-08 NOTE — TELEPHONE ENCOUNTER
Per Nati she said it was ok to increase Sertraline to 50mg daily and talk about it at appt on 12/19. Sent message to patient on [employer].

## 2022-12-09 NOTE — TELEPHONE ENCOUNTER
Patient Contact    Attempt # 3    Was call answered?  No.  Left message on voicemail with information to call clinic back.    Per chart review, patient read [employer] message sent on 12/06/22 with lab results and [facility] scheduling phone number.    Claudia Garcia RN  [facility]

## 2022-12-16 ENCOUNTER — HOSPITAL ENCOUNTER (OUTPATIENT)
Dept: CARDIOLOGY | Facility: CLINIC | Age: 27
Discharge: HOME | End: 2022-12-16
Attending: INTERNAL MEDICINE
Payer: COMMERCIAL

## 2022-12-16 DIAGNOSIS — M62.81 GENERALIZED MUSCLE WEAKNESS: ICD-10-CM

## 2022-12-16 DIAGNOSIS — R00.2 PALPITATIONS: ICD-10-CM

## 2022-12-16 DIAGNOSIS — R42 DIZZINESS: ICD-10-CM

## 2022-12-16 PROCEDURE — 93248 EXT ECG>7D<15D REV&INTERPJ: CPT | Performed by: INTERNAL MEDICINE

## 2022-12-16 PROCEDURE — 93306 TTE W/DOPPLER COMPLETE: CPT

## 2022-12-16 PROCEDURE — 93246 EXT ECG>7D<15D RECORDING: CPT

## 2022-12-18 ASSESSMENT — PATIENT HEALTH QUESTIONNAIRE - PHQ9
SUM OF ALL RESPONSES TO PHQ QUESTIONS 1-9: 11
10. IF YOU CHECKED OFF ANY PROBLEMS, HOW DIFFICULT HAVE THESE PROBLEMS MADE IT FOR YOU TO DO YOUR WORK, TAKE CARE OF THINGS AT HOME, OR GET ALONG WITH OTHER PEOPLE: SOMEWHAT DIFFICULT
SUM OF ALL RESPONSES TO PHQ QUESTIONS 1-9: 11

## 2022-12-19 ENCOUNTER — VIRTUAL VISIT (OUTPATIENT)
Dept: PSYCHIATRY | Facility: CLINIC | Age: 27
End: 2022-12-19
Payer: COMMERCIAL

## 2022-12-19 DIAGNOSIS — F33.0 MILD EPISODE OF RECURRENT MAJOR DEPRESSIVE DISORDER (H): Primary | ICD-10-CM

## 2022-12-19 DIAGNOSIS — F41.1 GENERALIZED ANXIETY DISORDER: ICD-10-CM

## 2022-12-19 LAB — LVEF ECHO: NORMAL

## 2022-12-19 PROCEDURE — 99214 OFFICE O/P EST MOD 30 MIN: CPT | Mod: 95 | Performed by: NURSE PRACTITIONER

## 2022-12-19 NOTE — PROGRESS NOTES
"Deborah is a 27 year old who is being evaluated via a billable video visit.      How would you like to obtain your AVS? MyChart  If the video visit is dropped, the invitation should be resent by: Text to cell phone: [phone]  Will anyone else be joining your video visit? No        Video-Visit Details    Video Start Time: 2:23 PM    Type of service:  Video Visit    Video End Time:2:46 PM    Originating Location (pt. Location): Home        Distant Location (provider location):  Off-site    Platform used for Video Visit: [facility]         Outpatient Psychiatric Progress Note    Name: Deborah Swartz   : 1995                    Primary Care Provider: DANYELLE LIU MD   Therapist: None    PHQ-9 scores:  PHQ-9 SCORE 9/15/2022 10/23/2022 2022   PHQ-9 Total Score - - -   PHQ-9 Total Score MyChart 3 (Minimal depression) 6 (Mild depression) 11 (Moderate depression)   PHQ-9 Total Score 3 6 11       JASIEL-7 scores:  JASIEL-7 SCORE 2020 2022 10/23/2022   Total Score - 3 (minimal anxiety) 4 (minimal anxiety)   Total Score 3 3 4       Patient Identification:    Patient is a 27 year old year old,   White Not  or  female  who presents for return visit with me.  Patient is currently on medical leave from Job at [employer]. Patient attended the session alone. Patient prefers to be called: \" Deborah\".    Current medications include: ASPIRIN LOW DOSE 81 MG EC tablet, daily  folic acid (FOLVITE) 1 MG tablet, Take 1 tablet (1 mg) by mouth daily  meclizine (ANTIVERT) 25 MG tablet, TAKE 1 TABLET(25 MG) BY MOUTH THREE TIMES DAILY AS NEEDED FOR DIZZINESS  Prenatal Vit-Fe Sulfate-FA-DHA (PRENATAL VITAMIN/MIN +DHA PO),   sertraline (ZOLOFT) 25 MG tablet, Take 1 tablet (25 mg) by mouth daily    No current facility-administered medications on file prior to visit.       The [place] Prescription Monitoring Program has been reviewed and there are no concerns about diversionary activity for controlled substances at " this time.      I was able to review most recent Primary Care Provider, specialty provider, and therapy visit notes that I have access to.     Today, patient reports that she has the flu with coughing, fever, congestion.  Pregnancy is going fine - baby due in March.  She has a monitor on for heart arrhtymia.  Dizziness, low iron levels, no recent falls.  Mood swings - neutral to down.  Depression symptoms seem to be related to environmental stressors.  Financial issues -  laid off, car problems.  She has a driving permit and is unable to drive alone.       has a past medical history of Asthma, Depressive disorder (N/A), Mental disorder, Papanicolaou smear of cervix with atypical squamous cells of undetermined significance (ASC-US) (09/22/2016), and Postpartum depression.    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  Her  is out of work.      Substance use updates:    No alcohol reported  Tobacco use: No    Vital Signs:   LMP 06/01/2022 (Approximate)     Labs:    Most recent laboratory results reviewed and no new labs.     Mental Status Examination:  Appearance: awake, alert, casual dress and mild distress  Attitude: cooperative  Eye Contact:  adequate  Gait and Station: No assistive Devices used and No dizziness or falls  Psychomotor Behavior:  intact station, gait and muscle tone  Oriented to:  time, person, and place  Attention Span and Concentration:  Normal  Speech:   clear, coherent and Speaks: English  Mood:  anxious and depressed  Affect:  mood congruent  Associations:  no loose associations  Thought Process:  goal oriented  Thought Content:  no evidence of suicidal ideation or homicidal ideation, no auditory hallucinations present and no visual hallucinations present  Recent and Remote Memory:  intact Not formally assessed. No amnesia.  Fund of Knowledge: appropriate  Insight:  good  Judgment:  intact  Impulse Control:  intact    Suicide Risk Assessment:  Today Deborah  STPEHANIA Swartz reports no thoughts to harm themself or others. In addition, there are notable risk factors for self-harm, including anxiety and comorbid medical condition of pregnancy. However, risk is mitigated by commitment to family, history of seeking help when needed, future oriented, denies suicidal intent or plan and denies homicidal ideation, intent, or plan. Therefore, based on all available evidence including the factors cited above, Deborah Swartz does not appear to be at imminent risk for self-harm, does not meet criteria for a 72-hr hold, and therefore remains appropriate for ongoing outpatient level of care.  A thorough assessment of risk factors related to suicide and self-harm have been reviewed and are noted above. The patient convincingly denies suicidality on several occasions. Local community safety resources printed and reviewed for patient to use if needed. There was no deceit detected, and the patient presented in a manner that was believable.     DSM5 Diagnosis:  296.31 (F33.0) Major Depressive Disorder, Recurrent Episode, Mild _ and With anxious distress  300.02 (F41.1) Generalized Anxiety Disorder    Medical comorbidities include:   Patient Active Problem List    Diagnosis Date Noted     Muscle weakness (generalized) 11/07/2022     Priority: Medium     Low lying placenta nos or without hemorrhage, second trimester 11/02/2022     Priority: Medium     Circumvallate placenta in second trimester 11/02/2022     Priority: Medium     Supervision of high-risk pregnancy 08/15/2022     Priority: Medium     Encounter for insertion of mirena IUD 08/09/2022     Priority: Medium     Encounter for pregnancy test, result unknown 08/09/2022     Priority: Medium     Cervical cancer screening 08/09/2022     Priority: Medium     Vaginal yeast infection 08/09/2022     Priority: Medium     Other migraine without status migrainosus, not intractable 06/29/2022     Priority: Medium     Fatigue, unspecified type 06/29/2022      Priority: Medium     Mild episode of recurrent major depressive disorder (H) 06/07/2022     Priority: Medium     Dizziness 06/07/2022     Priority: Medium     Encounter for IUD insertion 06/07/2022     Priority: Medium     Vitamin D deficiency 06/07/2022     Priority: Medium     Vitamin B 12 deficiency 06/07/2022     Priority: Medium     Tobacco use disorder 03/13/2017     Priority: Medium     Papanicolaou smear of cervix with atypical squamous cells of undetermined significance (ASC-US) 09/22/2016     Priority: Medium     9/22/16: ASCUS Pap. Plan pap in 1 year.   10/30/17 Lost to follow-up for pap tracking  8/9/22 NIL Pap, Neg HPV. Plan pap in 1 year.   8/17/2022 Pt viewed result on MyChart.         Mild intermittent asthma 05/21/2015     Priority: Medium       Assessment:    Deborah Swartz reports ongoing depression symptoms.  She denies any manic episodes.  I increased her dose of sertraline to 50 mg daily.  Pregnancy is continuing without incident.  I did make a care coordination referral to help her access community financial support as her  has been laid off.  She is not able to drive unless someone is riding with her due to her only having a permit.  Denies any suicide thinking..    Medication side effects and alternatives were reviewed. Health promotion activities recommended and reviewed today. All questions addressed. Education and counseling completed regarding risks and benefits of medications and psychotherapy options.    Treatment Plan:        1.  Care coordination referral made to help you access community financial support    2.  Increase sertraline to 50 mg daily    3.  Talk therapy, when able to, for processing life stressors        Continue all other medications as reviewed per electronic medical record today.     Safety plan reviewed. To the Emergency Department as needed or call after hours crisis line at [phone] or [phone]. [place] Crisis Text [place]. Text [place] to 784615 or  Suicide LifeLine Chat: suicideLatest Medical.org/chat/    To schedule individual or family therapy, call [facility] at [phone]    Schedule an appointment with me in January or sooner as needed. Call [facility] at [phone] to schedule.    Follow up with primary care provider as planned or for acute medical concerns.    Call the psychiatric nurse line with medication questions or concerns at [phone]    MyChart may be used to communicate with your provider, but this is not intended to be used for emergencies.    Crisis Resources:    National Suicide Prevention Lifeline: 607.650.5130 (TTY: 913.605.8349). Call anytime for help.  (www.suicidepreventionlifeline.org)  National [place] on Mental Illness (www.priyank.org): 117.172.8191 or 206-260-3139.   Mental Health Association (www.mentalhealth.org): 148.726.1374 or 642-135-6278.  [place] Crisis [place]: Text [place] to 619352  Suicide LifeLine Chat: suicideLatest Medical.org/chat    Administrative Billing:   Time spent with patient includes counseling and coordination of care regarding above diagnoses and treatment plan.    Patient Status:  Patient will continue to be seen for ongoing consultation and stabilization.    Signed:   MILLER Florence-BC   Psychiatry         Answers for HPI/ROS submitted by the patient on 12/18/2022  If you checked off any problems, how difficult have these problems made it for you to do your work, take care of things at home, or get along with other people?: Somewhat difficult  PHQ9 TOTAL SCORE: 11

## 2022-12-19 NOTE — PATIENT INSTRUCTIONS
1.  Care coordination referral made to help you access community financial support  2.  Increase sertraline to 50 mg daily  3.  Talk therapy, when able to, for processing life stressors    Continue all other medications as reviewed per electronic medical record today.   Safety plan reviewed. To the Emergency Department as needed or call after hours crisis line at [phone] or [phone]. [place] Crisis Text Line. Text [place] to 877171 or Suicide LifeLine Chat: [email or web address].[email or web address]/chat/  To schedule individual or family therapy, call [facility] at [phone]  Schedule an appointment with me in January or sooner as needed. Call [facility] at [phone] to schedule.  Follow up with primary care provider as planned or for acute medical concerns.  Call the psychiatric nurse line with medication questions or concerns at [phone]  MyChart may be used to communicate with your provider, but this is not intended to be used for emergencies.    Crisis Resources:    National Suicide Prevention Lifeline: 959.561.3105 (TTY: 968.161.1990). Call anytime for help.  (www.suicidepreventionlifeline.org)  National [place] on Mental Illness (www.priyank.org): 376.468.6480 or 161-683-3361.   Mental Health Association (www.mentalhealth.org): 178.569.6923 or 007-229-1677.  [place] Crisis Text [place]: Text [place] to 557940  Suicide LifeLine Chat: suicideHeartland Dental Care.org/chat

## 2022-12-20 ENCOUNTER — PATIENT OUTREACH (OUTPATIENT)
Dept: CARE COORDINATION | Facility: CLINIC | Age: 27
End: 2022-12-20

## 2022-12-20 ENCOUNTER — TELEPHONE (OUTPATIENT)
Dept: FAMILY MEDICINE | Facility: CLINIC | Age: 27
End: 2022-12-20

## 2022-12-20 ENCOUNTER — OFFICE VISIT (OUTPATIENT)
Dept: MATERNAL FETAL MEDICINE | Facility: CLINIC | Age: 27
End: 2022-12-20
Attending: OBSTETRICS & GYNECOLOGY
Payer: COMMERCIAL

## 2022-12-20 ENCOUNTER — HOSPITAL ENCOUNTER (OUTPATIENT)
Dept: ULTRASOUND IMAGING | Facility: CLINIC | Age: 27
Discharge: HOME | End: 2022-12-20
Attending: OBSTETRICS & GYNECOLOGY
Payer: COMMERCIAL

## 2022-12-20 DIAGNOSIS — O35.8XX0 UMBILICAL VEIN ABNORMALITY AFFECTING PREGNANCY, SINGLE OR UNSPECIFIED FETUS: Primary | ICD-10-CM

## 2022-12-20 DIAGNOSIS — O99.019 IRON DEFICIENCY ANEMIA DURING PREGNANCY: Primary | ICD-10-CM

## 2022-12-20 DIAGNOSIS — O26.90 PREGNANCY RELATED CONDITION, ANTEPARTUM: ICD-10-CM

## 2022-12-20 DIAGNOSIS — D50.9 IRON DEFICIENCY ANEMIA DURING PREGNANCY: Primary | ICD-10-CM

## 2022-12-20 DIAGNOSIS — R42 DIZZINESS: ICD-10-CM

## 2022-12-20 PROCEDURE — 76811 OB US DETAILED SNGL FETUS: CPT | Mod: 26 | Performed by: OBSTETRICS & GYNECOLOGY

## 2022-12-20 PROCEDURE — 76811 OB US DETAILED SNGL FETUS: CPT

## 2022-12-20 NOTE — LETTER
2022    INSURER: Payor: FREDDIE / Plan: BCBS OUT OF STATE / Product Type: Indemnity /   ATTN:   Re: Prior Authorization Request  Patient: Deborah Swartz  Policy ID#:  [id number]  : 1995      To Whom it May Concern:    I am writing to formally request a prior authorization of coverage for my patient,  Deborah Swartz, for treatment using [LIST SPECIFIC THERAPY].  I am requesting authorization for applicable provider professional and facility services associated with this therapy.    The therapy involves iron (injectafer) infusions.      The benefits of the therapy include repletion of iron levels, helping with her symptoms of dizziness and pregnancy.      I have treated Deborah Swartz since  and I have determined that it is medically appropriate for  this patient to receive be treated with injectafer infusions for the reason(s) stated below:      Iron deficiency in pregnancy leading to dizziness and near syncope/falls.         She is pregnant and this is necessary.       Dizziness, fall, complications in pregnancy.     I have included medical records pertaining to the patient s medical history, current condition and treatment plan.  In addition, the following billing codes will be used for therapy and follow-up: iron deficiency in pregnancy (O99.019, D50.9)        I firmly believe that this therapy is clinically appropriate and that Deborah Swartz would benefit from improved [clinical outcomes, quality of life] if allowed the opportunity to receive this treatment.  Please contact me at Dept: [phone] if you require additional information to ensure the prompt approval for coverage.    Please send your written decision to me at this address:  [address], [address]  [facility] [id number] [phone]  Dept: [phone]        Sincerely,          Katie Perez MD        Enclosures

## 2022-12-20 NOTE — PROGRESS NOTES
Clinic Care Coordination Contact  Community Health Worker Initial Outreach    CHW introduced self, intent of call, gave contact info, and offered the CC program.    CHW Initial Information Gathering:  Referral Source: PCP    Reason for Referral:  Financial Support  Resources for Transportation  Food  Housing    Preferred Hospital: [facility]  [phone]  Current living arrangement:: I live in a private home with family  Type of residence:: Apartment  Community Resources: None  Supplies Currently Used at Home: None  Equipment Currently Used at Home: none  Informal Support system:: Spouse, Family  No PCP office visit in Past Year: No  Transportation means::  ([employer])  CHW Additional Questions  If ED/Hospital discharge, follow-up appointment scheduled as recommended?: N/A  Medication changes made following ED/Hospital discharge?: N/A  MyChart active?: Yes  Patient sent Social Determinants of Health questionnaire?: Yes    Patient accepts CC: Yes. Patient scheduled for assessment with CAMILO Barnes on 12/22/22 at 11:00am. Patient noted desire to discuss rental assistance, transportation, baby resources- diapers, [place] [facility], [place] benefits- WIC, SNAP, & Cash Assistance, and CC support.     Clinic Care Coordination Contact  Financial Resource Worker Screening    County Benefits  Is patient requesting help applying for [facility] benefits?: Yes (SNAP and Cash Assistance)  Have you recently applied for any [facility] benefits?: Yes  What was applied for? : SNAP  Application date:: September 2022- Denied  How many people in your household?: 4  Do you buy/eat food together?: Yes  What is the monthly gross income for the household (wages, social security, workers comp, and pension)? : 0 (Pt's  recently lost his job- pt is on a leave with no benefits or income.)    [place]:  [place] Yes  Is this an insurance renewal?: No  Is this a new insurance application  request?:  (unsure)    Any other information for the FRW?: Patient has outstanding [facility] bills.    Claudia Gore, TAMRA  Clinic Care Coordination  [facility]: [place], and [place] [facility]  Phone: [phone]

## 2022-12-20 NOTE — TELEPHONE ENCOUNTER
----- Message from Darian Choudhury sent at 12/19/2022 12:44 PM CST -----  Regarding: RE: INJECTAFER OFF LABEL  You have the option to either write an LMN in order for us to submit to insurance and see if insurance will cover an off label request (not guarantee of coverage); or, postpone the patient until they test Anemic. But if it's a change in tx then we cannot advise on such.     If deemed medically urgent then you will need to place a telephone encounter in the patient's chart and we can proceed with the waiver route.    Thank you,  Darian  ----- Message -----  From: Katie Perez MD  Sent: 12/19/2022  11:33 AM CST  To: Darian Choudhury  Subject: RE: INJECTAFER OFF LABEL                         Deborah Gonsalez needs the iron infusions, mainly because the deficiency is causing symptoms and she is also pregnant. She needs it as soon as possible.   What are the other options?    Dr Katie Perez  [facility]    ----- Message -----  From: Darian Choudhury  Sent: 12/19/2022   9:19 AM CST  To: Katie Perez MD, Claudia Garcia RN, #  Subject: INJECTAFER OFF LABEL                             Hello,    We are helping to secure coverage for Injectafer for this patient. Unfortunately, the patient's insurance requires that they are considered anemic in order to cover injectafer. Insurance will base anemia status on HGB levels, which appear to be normal from the most recent labs. Since the HGB levels are normal they will not cover injectafer for the patient's appointment on 12/21/22. Would you be open to postponing the iron infusion until the patient tests anemic?    Let me know how you would like to proceed.    Thank you,  Darian Choudhury  Infusion

## 2022-12-20 NOTE — PROGRESS NOTES
Clinic Care Coordination Contact  Program: [facility]   County:  [facility] Case #:  [facility] Worker:   Mnsure #:   Subscriber #:   Renewal:  Date Applied:     FRW Outreach:   12/21/22:    Health Insurance:      Referral/Screening:  Financial Resource Worker Screening     [facility] Benefits  Is patient requesting help applying for [facility] benefits?: Yes (SNAP and Cash Assistance)  Have you recently applied for any [facility] benefits?: Yes  What was applied for? : SNAP  Application date:: September 2022- Denied  How many people in your household?: 4  Do you buy/eat food together?: Yes  What is the monthly gross income for the household (wages, social security, workers comp, and pension)? : 0 (Pt's  recently lost his job- pt is on a leave with no benefits or income.)     [place]:  [place] Yes  Is this an insurance renewal?: No  Is this a new insurance application request?:  (unsure)     Any other information for the FRW?: Patient has outstanding [facility] bills.     GIANCARLO Savage  Clinic Care Coordination  [facility]: [place], and [facility]  Phone: [phone]

## 2022-12-20 NOTE — PROGRESS NOTES
The patient was seen for an ultrasound in the [facility] at the [facility] today.  For a detailed report of the ultrasound examination, please see the ultrasound report which can be found under the imaging tab.    Jenny Rae MD  , OB/GYN  Maternal-Fetal Medicine  [phone] (Pager)

## 2022-12-20 NOTE — TELEPHONE ENCOUNTER
TO PCP - see message we received from infusion staff:     Darian Choudhury Tooba, MD; P Infusion Finance - Sd/Rd; Durga Morocho RN; Mague Ruiz RN; Claudia Garcia, RN  Hello,     We are helping to secure coverage for Injectafer for this patient. Unfortunately, the patient's insurance requires that they are considered anemic in order to cover injectafer. Insurance will base anemia status on HGB levels, which appear to be normal from the most recent labs. Since the HGB levels are normal they will not cover injectafer for the patient's appointment on 12/21/22. Would you be open to postponing the iron infusion until the patient tests anemic?     Let me know how you would like to proceed.     Thank you,   Darian Choudhury   Infusion      Please advise     Mague RAI, Triage RN  [facility]

## 2022-12-21 ENCOUNTER — INFUSION THERAPY VISIT (OUTPATIENT)
Dept: INFUSION THERAPY | Facility: CLINIC | Age: 27
End: 2022-12-21
Attending: INTERNAL MEDICINE
Payer: COMMERCIAL

## 2022-12-21 ENCOUNTER — PATIENT OUTREACH (OUTPATIENT)
Dept: CARE COORDINATION | Facility: CLINIC | Age: 27
End: 2022-12-21

## 2022-12-21 VITALS
SYSTOLIC BLOOD PRESSURE: 107 MMHG | DIASTOLIC BLOOD PRESSURE: 70 MMHG | TEMPERATURE: 98.3 F | RESPIRATION RATE: 18 BRPM | OXYGEN SATURATION: 99 % | HEART RATE: 74 BPM

## 2022-12-21 DIAGNOSIS — R42 DIZZINESS: ICD-10-CM

## 2022-12-21 DIAGNOSIS — D50.9 IRON DEFICIENCY ANEMIA DURING PREGNANCY: Primary | ICD-10-CM

## 2022-12-21 DIAGNOSIS — O99.019 IRON DEFICIENCY ANEMIA DURING PREGNANCY: Primary | ICD-10-CM

## 2022-12-21 PROCEDURE — 96365 THER/PROPH/DIAG IV INF INIT: CPT

## 2022-12-21 PROCEDURE — 258N000003 HC RX IP 258 OP 636: Performed by: INTERNAL MEDICINE

## 2022-12-21 PROCEDURE — 250N000011 HC RX IP 250 OP 636: Performed by: INTERNAL MEDICINE

## 2022-12-21 RX ORDER — DIPHENHYDRAMINE HYDROCHLORIDE 50 MG/ML
50 INJECTION INTRAMUSCULAR; INTRAVENOUS
Status: CANCELLED
Start: 2022-12-28

## 2022-12-21 RX ORDER — HEPARIN SODIUM,PORCINE 10 UNIT/ML
5 VIAL (ML) INTRAVENOUS
Status: CANCELLED | OUTPATIENT
Start: 2022-12-28

## 2022-12-21 RX ORDER — ALBUTEROL SULFATE 90 UG/1
1-2 AEROSOL, METERED RESPIRATORY (INHALATION)
Status: CANCELLED
Start: 2022-12-28

## 2022-12-21 RX ORDER — MEPERIDINE HYDROCHLORIDE 25 MG/ML
25 INJECTION INTRAMUSCULAR; INTRAVENOUS; SUBCUTANEOUS EVERY 30 MIN PRN
Status: CANCELLED | OUTPATIENT
Start: 2022-12-28

## 2022-12-21 RX ORDER — ALBUTEROL SULFATE 0.83 MG/ML
2.5 SOLUTION RESPIRATORY (INHALATION)
Status: CANCELLED | OUTPATIENT
Start: 2022-12-28

## 2022-12-21 RX ORDER — EPINEPHRINE 1 MG/ML
0.3 INJECTION, SOLUTION INTRAMUSCULAR; SUBCUTANEOUS EVERY 5 MIN PRN
Status: CANCELLED | OUTPATIENT
Start: 2022-12-28

## 2022-12-21 RX ORDER — METHYLPREDNISOLONE SODIUM SUCCINATE 125 MG/2ML
125 INJECTION, POWDER, LYOPHILIZED, FOR SOLUTION INTRAMUSCULAR; INTRAVENOUS
Status: CANCELLED
Start: 2022-12-28

## 2022-12-21 RX ORDER — HEPARIN SODIUM (PORCINE) LOCK FLUSH IV SOLN 100 UNIT/ML 100 UNIT/ML
5 SOLUTION INTRAVENOUS
Status: CANCELLED | OUTPATIENT
Start: 2022-12-28

## 2022-12-21 RX ADMIN — SODIUM CHLORIDE 250 ML: 9 INJECTION, SOLUTION INTRAVENOUS at 15:26

## 2022-12-21 RX ADMIN — FERRIC CARBOXYMALTOSE INJECTION 750 MG: 50 INJECTION, SOLUTION INTRAVENOUS at 15:27

## 2022-12-21 ASSESSMENT — PAIN SCALES - GENERAL: PAINLEVEL: NO PAIN (0)

## 2022-12-21 NOTE — PROGRESS NOTES
Clinic Care Coordination Contact  Program: [facility]   County:  [facility] Case #:  [facility] Worker:   [place] #:   Subscriber #:   Renewal:  Date Applied:     FRW Outreach:   12/21/22: FRW called pt and emailed the applications for pt to complete. FRW will reach out to pt in 3 weeks.     Health Insurance:      Referral/Screening:  Financial Resource Worker Screening     [facility] Benefits  Is patient requesting help applying for [facility] benefits?: Yes (SNAP and Cash Assistance)  Have you recently applied for any [facility] benefits?: Yes  What was applied for? : SNAP  Application date:: September 2022- Denied  How many people in your household?: 4  Do you buy/eat food together?: Yes  What is the monthly gross income for the household (wages, social security, workers comp, and pension)? : 0 (Pt's  recently lost his job- pt is on a leave with no benefits or income.)     [place]:  [place] Yes  Is this an insurance renewal?: No  Is this a new insurance application request?:  (unsure)     Any other information for the FRW?: Patient has outstanding [facility] bills.     GIANCARLO Savage  Clinic Care Coordination  [facility]: [place], [place], [place], and [facility]  Phone: [phone]

## 2022-12-21 NOTE — PROGRESS NOTES
Infusion Nursing Note:  Deborah Swartz presents today for injectafer 1/2.    Patient seen by provider today: No   present during visit today: Not Applicable.    Note: N/A.    Intravenous Access:  Peripheral IV placed.    Treatment Conditions:  Not Applicable.    Post Infusion Assessment:  Patient tolerated infusion without incident.  Patient observed for 30 minutes post injectafer per protocol.  Site patent and intact, free from redness, edema or discomfort.  No evidence of extravasations.  Access discontinued per protocol.     Discharge Plan:   AVS to patient via MYCHART.  Patient will return in one week as prev magy for next appointment.   Patient discharged in stable condition accompanied by: self.  Departure Mode: Ambulatory.      Justino Vega RN

## 2022-12-22 ENCOUNTER — PATIENT OUTREACH (OUTPATIENT)
Dept: CARE COORDINATION | Facility: CLINIC | Age: 27
End: 2022-12-22

## 2022-12-22 ENCOUNTER — APPOINTMENT (OUTPATIENT)
Dept: NURSING | Facility: CLINIC | Age: 27
End: 2022-12-22
Payer: COMMERCIAL

## 2022-12-22 ASSESSMENT — ACTIVITIES OF DAILY LIVING (ADL): DEPENDENT_IADLS:: INDEPENDENT

## 2022-12-22 NOTE — PROGRESS NOTES
Clinic Care Coordination Contact    Clinic Care Coordination Contact  OUTREACH    Referral Information:  Referral Source: PCP  MUSA spoke to pt about her and her family's overall wellbeing.   Pt states she is feeling okay today, but has been struggling lately with irritability.  Pt states she has a 3 and 1 year old and pets.  Pt states her  is supportive, but they are both distraught over the recent financial difficulties they are facing.  Pt states she slept okay last night which helps.  Pt states she wishes that she knew whether her medical issues will prevent her from returning to her job at [employer], as she has been there for more than 3 years.  Pt states her collapsing at work is extremely stressful for her and her family, and she hopes that her providers will be able to help figure out why this happens and whether it will subside after delivery.    Pt states she and her  would like to be able to meet their own needs, and feel badly that others are helping them out with food and other basic needs.  Pt states their families have been generous with food and supplies.  Pt states she is up to date on rent, but they have no means to pay rent for January.  Pt states she has a very small amount in 401 and hopes that she can withdraw that and there will be enough to pay rent. SW encouraged pt to consider emergency assistance, and other community resources.   Pt expresses openness to SW reaching out monthly, as she feels things are quite stressful for her currently.  Pt states she has good mental health support currently, and does not have the energy to do any more therapy right now.            Chief Complaint   Patient presents with     Clinic Care Coordination - Initial        Universal Utilization:   Clinic Utilization  Difficulty keeping appointments:: No  Compliance Concerns: No  No-Show Concerns: No  No PCP office visit in Past Year: No  Utilization    Hospital Admissions  0             ED Visits  0              No Show Count (past year)  4                Current as of: 12/21/2022  7:50 PM              Clinical Concerns:  Current Medical Concerns:  Unexplained medical issues during pregnancy    Current Behavioral Concerns: Feels irritable at times, worried about postpartum depression    Education Provided to patient: Community resources    Pain  Pain (GOAL):: No  Health Maintenance Reviewed:    Clinical Pathway: None    Medication Management:  Medication review status: Medications reviewed and no changes reported per patient.        Pt reports that setraline was just increased.     Functional Status:  Dependent ADLs:: Independent  Dependent IADLs:: Independent  Mobility Status: Independent  Any fall with injury in the past year?: Yes    Living Situation:  Current living arrangement:: I live in a private home with family  Type of residence:: Apartment    Lifestyle & Psychosocial Needs:    Social Determinants of Health     Tobacco Use: Medium Risk     Smoking Tobacco Use: Former     Smokeless Tobacco Use: Former     Passive Exposure: Not on file   Alcohol Use: Not At Risk     Frequency of Alcohol Consumption: Never     Average Number of Drinks: Patient does not drink     Frequency of Binge Drinking: Never   Financial Resource Strain: High Risk     Difficulty of Paying Living Expenses: Very hard   Food Insecurity: Food Insecurity Present     Worried About Running Out of Food in the Last Year: Sometimes true     Ran Out of Food in the Last Year: Sometimes true   Transportation Needs: Unmet Transportation Needs     Lack of Transportation (Medical): Yes     Lack of Transportation (Non-Medical): Yes   Physical Activity: Sufficiently Active     Days of Exercise per Week: 7 days     Minutes of Exercise per Session: 30 min   Stress: Stress Concern Present     Feeling of Stress : To some extent   Social Connections: Moderately Isolated     Frequency of Communication with Friends and Family: More than three times a week      Frequency of Social Gatherings with Friends and Family: Never     Attends Rastafari Services: Never     Active Member of Clubs or Organizations: No     Attends Club or Organization Meetings: Not on file     Marital Status:    Intimate Partner Violence: Not on file   Depression: Not at risk     PHQ-2 Score: 2   Housing Stability: High Risk     Unable to Pay for Housing in the Last Year: Yes     Number of Places Lived in the Last Year: 1     Unstable Housing in the Last Year: No     Inadequate nutrition (GOAL):: No  Tube Feeding: No  Inadequate activity/exercise (GOAL):: No  Significant changes in sleep pattern (GOAL): No  Transportation means:: Regular car     Rastafari or spiritual beliefs that impact treatment:: No  Mental health DX:: Yes  Chemical Dependency Status: No Current Concerns  Informal Support system:: Spouse, Family             Resources and Interventions:  Current Resources:      Community Resources: None  Supplies Currently Used at Home: None  Equipment Currently Used at Home: none  Employment Status: other (see comments) (On leave due to pregnancy complications)         Advance Care Plan/Directive  Advanced Care Plan/Directive Status: Declined Further Information    Referrals Placed: Community Resources         Care Plan:  Care Plan: Community Resources       Care Plan: Community Resources     Problem: Severe financial stressors due to pregnancy complication and spouse layoff     Note:     Goal Statement: Over the next 6 months, Deborah will continue working with Care Coordination to ensure her  needs are met for her overall health and wellbeing.  Date Goal Set: 12/22/22  Barriers: Severe financial stressors  Strengths: Strong support system  Date to Achieve By: 6/22/23  Patient expressed understanding of goal: yes  Action steps to achieve this goal:  1. I will continue to follow up with medical team as needed  2. I will continue to complete paperwork for benefit eligibility and review community  resource information sent by SW  3. I will outreach to Care Coordination  for further questions or concerns        Goal: Establish Reliable Transportation                       Patient/Caregiver understanding: Pt reports understanding and denies any additional questions or concerns at this times. MUSA CC engaged in AIDET communication during encounter.      Outreach Frequency: monthly  Future Appointments              Tomorrow WE LAB [facility] [place]    Tomorrow Mary Giles MD [facility]    In 6 days  INFUSION BED 2;  CANCER INFUSION NURSE [facility] [place], [facility]    In 1 week Nati Jett NP [facility], [facility]    In 2 weeks [id number] [facility] [place]    In 2 weeks  RAINER LADD [facility] [place], [facility]    In 3 weeks Carlitos Smith PA-C [facility] [place], [facility]    In 3 weeks [id number] [facility] [place], [facility]    In 3 weeks SH RAINER LADD [facility] [place], [facility]    In 1 month Nati Jett NP [facility]          Plan: Pt will email MUSA the FRW documents, as she is not comfortable sending them via USPS  SW to reach out in one month.   Nohelia Iniguez,  [facility]  Clinic Care Coordinator  [facility]  [phone]  [email or web address]

## 2022-12-22 NOTE — LETTER
[facility] CARE COORDINATION  [address] [place] [address]    December 22, 2022    Deborah Swartz  [address] [place] [address]      Dear Deborah,        I am a clinic care coordinator who works with DANYELLE LIU MD with the [facility]. I wanted to introduce myself and provide you with my contact information for you to be able to call me with any questions or concerns. Below is a description of clinic care coordination and how I can further assist you.       The clinic care coordination team is made up of a registered nurse, , financial resource worker and community health worker who understand the health care system. The goal of clinic care coordination is to help you manage your health and improve access to the health care system. Our team works alongside your provider to assist you in determining your health and social needs. We can help you obtain health care and community resources, providing you with necessary information and education. We can work with you through any barriers and develop a care plan that helps coordinate and strengthen the communication between you and your care team.    Please feel free to contact me with any questions or concerns regarding care coordination and what we can offer.      We are focused on providing you with the highest-quality healthcare experience possible.    Sincerely,     Nohelia Iniguez, [facility]  Clinic Care Coordinator  [facility]  [phone]      Enclosed: See community resources listed below.  There are resources for baby supplies, food, and financial support.    I think the [place] Options for women might be helpful for you if needed for getting help with rent or baby supplies.          Baby Supplies:  Tandem - http://www.wearetandem.org/mamas-jose.html  Individual Counseling and Support Groups. Childcare provided while utilizing Tandem services.  Care Packages - Every 3 months  you can come meet with an advocate and together you will assemble a care package for your family.  Thrift Store - Gently used clothing at very affordable prices. Clothing, diapers, toys, books, small equipment  12 - 3pm. Diaper Depot $3 - $5 per pack, 1 pack per month per child  Located: [address] [place] [address] - phone: [phone]  Hours: Monday, Wednesday, Thursday 9am to 3pm and Tuesday appointment only     [facility] - https://[facility].org/  A supportive, non-judgmental environment for facing an unexpected pregnancy or sexual health concerns. Services include free pregnancy testing, , resources for general assistance, financial assistance, education and earn while you learn courses. Services assist new moms from pregnancy to parents (mom s, dad s, grandparents) with children up to 2 years of age. Lizy-based program  Free parental classes. Incentives for class completion: free car seats, free crib, free clothing, free pack and play, and so much more!  Located: [address], [place] [address] - phone: [phone] - appointments and walk-ins [facility] [place] - https://[facility].org/  Hope program: support available while pregnant until baby turns one. Earn points to redeem for baby items. Lizy-based program  Located: [address] [place] [address] - phone: [phone]    New Day; Pregnancy Care Center - https://www.newdaycenter.org/  Free and confidential: pregnancy mentoring,  recovery support and information, parenting classes, earn while you learn classes, help for families, community resources, and 24 hour helpline. Lizy-based program  Located: [address] [place] [address] - phone: [phone]   Hours: Monday, Wednesday, Friday 10am - 2pm and Thursday 10am - 6pm    Everyday Miracles - https://www.everyday-miracles.org/requests/  Most of our services are  available for free for those on straight Medicaid or Blue plus, [facility], and [facility]. Car Seats, Breast pumps, and  services available. As well as Chiropractic and acupuncture services. Many different childbirth education, breastfeeding, and parenting classes are available.  [address], [place] [address] - phone: [phone]    First Care Pregnancy Center - https://firstcarepregnancycenter.org/  We know that every child is unique, and so are you. From the moment you find out you are pregnant until your youngest child turns five, The Every Family Parenting Program provides tools to encourage and support you. Parenting Coaching and baby items available.  Located in [facility] and [place].    Messi Ackerman; Diaper Depot - https://www.[facility].org/community/neighborhood-ministries/supporting-neighborhood-youth-and-children/  N - 6 $3 per pack, Pull-ups $4 per pack. 2 packs per child per month until 4 years old.  [address] [place] [address] - phone [phone]  Tuesday 4 - 6pm; 1st and 3rd Saturday 11am - 1pm    Cradle of Hope - https://[facility].org/    Cradle of Hope is a azar-based organization that encourages life by providing financial aid to women and babies in crisis, especially those women who might not choose life because of financial pressures.   Offer support with rent/mortgage, utility bills, medical bills, childcare, transportation, portable cribs, safe sleep information, safe sleep class, baby shower baskets, and many other resources.  Location: [address], [place] [address]   Hours: Monday - Friday 8am - 4pm     [facility] - https://nlfs.org/services/pregnancy-help/  We can offer our Parenting Plus educational program to help you learn about parenting and receive baby items such as clothing, diapers, wipes, and new equipment as well information about additional community resources to assist you and your  child. Lizy-based program  Location: [address] [place] [address] - phone: [phone]    Diaper resources  https://www.diaperbankmn.org/diaper-bank-partner-agencies/  https://www.diaperbankmn.org/find-diapers/    Clothing  https://www.House Party/html/[place]_clothing_closets.html  https://www.House Party/html/[place]_clothing_closets.html  https://sites.Piano Media.com/view/kaitlynskloset-[place]/home    General Baby supplies  https://babiesneedboxes.org/  https://birthright.org/services/?tab=3    WIC - http://www.health.[facility]./divs/fh/wic/ppthome.html  A nutrition and breastfeeding program that helps young families eat well and be healthy.   [facility] can help: Pregnant woman learn about nutritious foods for a healthy pregnancy and birth. Support breastfeeding and help new moms meet their breastfeeding goals. Families provide nutritious foods to their young children so they are healthy, happy and ready to learn.  Phone: [phone] to find [facility] office nearest you    Helpful [facility] Website - https://Youku/what-we-do/community-resources.html    FOOD GROUP- https://thefoodgroupmn.org/   This website can help connect you with no-cost food availability.      https://stepslp.org/client-services/#RentalMortgage  STEP can help with food and emergency cash assistance.    Https://Yidio.org/  CAP agency can help with rental assitance, energy assistance, and other basic needs.

## 2022-12-22 NOTE — LETTER
[facility] CARE COORDINATION  [address] [place] [address]    December 22, 2022    Deborah Swartz  [address] [place] [address]      Dear Deborah,        I am a clinic care coordinator who works with DANYELLE LIU MD with the [facility]. I wanted to introduce myself and provide you with my contact information for you to be able to call me with any questions or concerns. Below is a description of clinic care coordination and how I can further assist you.       The clinic care coordination team is made up of a registered nurse, , financial resource worker and community health worker who understand the health care system. The goal of clinic care coordination is to help you manage your health and improve access to the health care system. Our team works alongside your provider to assist you in determining your health and social needs. We can help you obtain health care and community resources, providing you with necessary information and education. We can work with you through any barriers and develop a care plan that helps coordinate and strengthen the communication between you and your care team.    Please feel free to contact me with any questions or concerns regarding care coordination and what we can offer.      We are focused on providing you with the highest-quality healthcare experience possible.    Sincerely,     Nohelia Iniguez, [facility]  Clinic Care Coordinator  [facility] [place]  [phone]      Enclosed: See community resources listed below.  There are resources for baby supplies, food, and financial support.    I think the [place] Options for women might be helpful for you if needed for getting help with rent or baby supplies.          Baby Supplies:  Tandem - http://www.wearetandem.org/mamas-jose.html  Individual Counseling and Support Groups. Childcare provided while utilizing Tandem services.  Care Packages - Every 3  months you can come meet with an advocate and together you will assemble a care package for your family.  Thrift Store - Gently used clothing at very affordable prices. Clothing, diapers, toys, books, small equipment  12 - 3pm. Diaper Depot $3 - $5 per pack, 1 pack per month per child  Located: [address] [place] [address] - phone: [phone]  Hours: Monday, Wednesday, Thursday 9am to 3pm and Tuesday appointment only     [facility] - https://[facility].org/  A supportive, non-judgmental environment for facing an unexpected pregnancy or sexual health concerns. Services include free pregnancy testing, , resources for general assistance, financial assistance, education and earn while you learn courses. Services assist new moms from pregnancy to parents (mom s, dad s, grandparents) with children up to 2 years of age. Lizy-based program  Free parental classes. Incentives for class completion: free car seats, free crib, free clothing, free pack and play, and so much more!  Located: [address], [place] [address] - phone: [phone] - appointments and walk-ins [facility] - https://[facility].org/  Hope program: support available while pregnant until baby turns one. Earn points to redeem for baby items. Lizy-based program  Located: [address] [place] [address] - phone: [phone]    New Day; Pregnancy Care Center - https://www.newdaycenter.org/  Free and confidential: pregnancy mentoring,  recovery support and information, parenting classes, earn while you learn classes, help for families, community resources, and 24 hour helpline. Lizy-based program  Located: [address] [place] [address] - phone: [phone]   Hours: Monday, Wednesday, Friday 10am - 2pm and Thursday 10am - 6pm    Everyday Miracles - https://www.everyday-miracles.org/requests/  Most of our services are  available for free for those on straight Medicaid or Blue plus, [facility], and [facility]. Car Seats, Breast pumps, and  services available. As well as Chiropractic and acupuncture services. Many different childbirth education, breastfeeding, and parenting classes are available.  [address], [place] [address] - phone: [phone]    First Care Pregnancy Center - https://firstcarepregnancycenter.org/  We know that every child is unique, and so are you. From the moment you find out you are pregnant until your youngest child turns five, The Every Family Parenting Program provides tools to encourage and support you. Parenting Coaching and baby items available.  Located in [facility] and [place].    Messi Ackerman; Diaper Depot - https://www.[facility].org/community/neighborhood-ministries/supporting-neighborhood-youth-and-children/  N - 6 $3 per pack, Pull-ups $4 per pack. 2 packs per child per month until 4 years old.  [address] [place] [address] - phone [phone]  Tuesday 4 - 6pm; 1st and 3rd Saturday 11am - 1pm    Cradle of Hope - https://[facility] in [facility] Statese.org/    Cradle of Hope is a azar-based organization that encourages life by providing financial aid to women and babies in crisis, especially those women who might not choose life because of financial pressures.   Offer support with rent/mortgage, utility bills, medical bills, childcare, transportation, portable cribs, safe sleep information, safe sleep class, baby shower baskets, and many other resources.  Location: [address], [place] [address]   Hours: Monday - Friday 8am - 4pm     [facility] - https://nlfs.org/services/pregnancy-help/  We can offer our Parenting Plus educational program to help you learn about parenting and receive baby items such as clothing, diapers, wipes, and new equipment as well information about additional community resources to assist you and your  child. Lizy-based program  Location: [address] [place] [address] - phone: [phone]    Diaper resources  https://www.diaperbankmn.org/diaper-bank-partner-agencies/  https://www.diaperbankmn.org/find-diapers/    Clothing  https://www.AppTweak.com/html/[place]_[facility]_clothing_closets.html  https://www.AppTweak.com/html/[place]_clothing_closets.html  https://sites.TransLattice.com/view/kaitlynskloset-[place]/home    General Baby supplies  https://babiesneedboxes.org/  https://birthright.org/services/?tab=3    WIC - http://www.health.[facility]./divs/fh/wic/ppthome.html  A nutrition and breastfeeding program that helps young families eat well and be healthy.   [facility] can help: Pregnant woman learn about nutritious foods for a healthy pregnancy and birth. Support breastfeeding and help new moms meet their breastfeeding goals. Families provide nutritious foods to their young children so they are healthy, happy and ready to learn.  Phone: [phone] to find [facility] office nearest you    Helpful [facility] Website - https://Fermentalg/what-we-do/community-resources.html    FOOD GROUP- https://thefoodgroupmn.org/   This website can help connect you with no-cost food availability.      https://stepslp.org/client-services/#RentalMortgage  STEP can help with food and emergency cash assistance.    Https://Help Me Rent Magazine.org/  CAP agency can help with rental assitance, energy assistance, and other basic needs.

## 2022-12-23 ENCOUNTER — LAB (OUTPATIENT)
Dept: LAB | Facility: CLINIC | Age: 27
End: 2022-12-23
Payer: COMMERCIAL

## 2022-12-23 ENCOUNTER — PRENATAL OFFICE VISIT (OUTPATIENT)
Dept: OBGYN | Facility: CLINIC | Age: 27
End: 2022-12-23
Payer: COMMERCIAL

## 2022-12-23 VITALS — BODY MASS INDEX: 35.43 KG/M2 | DIASTOLIC BLOOD PRESSURE: 62 MMHG | WEIGHT: 200 LBS | SYSTOLIC BLOOD PRESSURE: 102 MMHG

## 2022-12-23 DIAGNOSIS — O09.93 SUPERVISION OF HIGH RISK PREGNANCY IN THIRD TRIMESTER: ICD-10-CM

## 2022-12-23 DIAGNOSIS — Z30.09 CONSULTATION FOR FEMALE STERILIZATION: ICD-10-CM

## 2022-12-23 DIAGNOSIS — O09.292 HISTORY OF PRE-ECLAMPSIA IN PRIOR PREGNANCY, CURRENTLY PREGNANT, SECOND TRIMESTER: ICD-10-CM

## 2022-12-23 DIAGNOSIS — O43.113 CIRCUMVALLATE PLACENTA IN THIRD TRIMESTER: ICD-10-CM

## 2022-12-23 DIAGNOSIS — Z23 NEED FOR TDAP VACCINATION: ICD-10-CM

## 2022-12-23 DIAGNOSIS — O34.219 HISTORY OF CESAREAN SECTION COMPLICATING PREGNANCY: ICD-10-CM

## 2022-12-23 DIAGNOSIS — O09.93 SUPERVISION OF HIGH RISK PREGNANCY IN THIRD TRIMESTER: Primary | ICD-10-CM

## 2022-12-23 DIAGNOSIS — O99.213 OBESITY AFFECTING PREGNANCY IN THIRD TRIMESTER: ICD-10-CM

## 2022-12-23 LAB
ERYTHROCYTE [DISTWIDTH] IN BLOOD BY AUTOMATED COUNT: 13.9 % (ref 10–15)
GLUCOSE 1H P 50 G GLC PO SERPL-MCNC: 189 MG/DL (ref 70–129)
HCT VFR BLD AUTO: 37.4 % (ref 35–47)
HGB BLD-MCNC: 11.7 G/DL (ref 11.7–15.7)
MCH RBC QN AUTO: 26.8 PG (ref 26.5–33)
MCHC RBC AUTO-ENTMCNC: 31.3 G/DL (ref 31.5–36.5)
MCV RBC AUTO: 86 FL (ref 78–100)
PLATELET # BLD AUTO: 148 10E3/UL (ref 150–450)
RBC # BLD AUTO: 4.37 10E6/UL (ref 3.8–5.2)
WBC # BLD AUTO: 8.2 10E3/UL (ref 4–11)

## 2022-12-23 PROCEDURE — 82950 GLUCOSE TEST: CPT

## 2022-12-23 PROCEDURE — 36415 COLL VENOUS BLD VENIPUNCTURE: CPT

## 2022-12-23 PROCEDURE — 90715 TDAP VACCINE 7 YRS/> IM: CPT | Performed by: STUDENT IN AN ORGANIZED HEALTH CARE EDUCATION/TRAINING PROGRAM

## 2022-12-23 PROCEDURE — 85027 COMPLETE CBC AUTOMATED: CPT

## 2022-12-23 PROCEDURE — 59425 ANTEPARTUM CARE ONLY: CPT | Performed by: STUDENT IN AN ORGANIZED HEALTH CARE EDUCATION/TRAINING PROGRAM

## 2022-12-23 PROCEDURE — 99207 PR PRENATAL VISIT: CPT | Performed by: STUDENT IN AN ORGANIZED HEALTH CARE EDUCATION/TRAINING PROGRAM

## 2022-12-23 PROCEDURE — 90471 IMMUNIZATION ADMIN: CPT | Performed by: STUDENT IN AN ORGANIZED HEALTH CARE EDUCATION/TRAINING PROGRAM

## 2022-12-23 NOTE — PROGRESS NOTES
Prenatal Care    Deborah Swartz is a 27 year old  at 29w2d by LMP c/w 11w1d US presenting for routine prenatal care. Denies LOF, VB, ctx. +FM. Neurologic symptoms from last week are improved. IV Fe helped.     Conditions affecting pregnancy:  - Low lying placenta, circumvallate  - Hx CS x 2  - Hx preE w/o SF    - migraine HA   - BMI 34  - MDD      Objective- see flow sheet      Deborah Swartz is a 27 year old  at 29w2d presenting for routine ob visit      ICD-10-CM    1. Supervision of high risk pregnancy in third trimester  O09.93       2. Circumvallate placenta in third trimester  O43.113       3. History of pre-eclampsia in prior pregnancy, currently pregnant, third trimester  O09.292       4. History of  section complicating pregnancy  O34.219       5. Consultation for female sterilization  Z30.09       6. Obesity affecting pregnancy in third trimester  O99.213           - Hx preE:    - ASA- rx provided   - Baseline preE labs wnl   - Hx of CS x 2: Plan to schedule at 39w. Tentatively 3/2- schedule at next visit.   - Depression: on Zoloft, seeing psych   - Sterilization: certain about this decision. Federal tubal papers signed.   - First tri labs wnl. Anatomy US with low lying placenta and circumvallate. placenta. Follow-up scheduled with MFM. S/p influenza. Declines COVID vax. Third tri labs, TDAP today.   - Discussed routine precautions. Episodic weakness improved. Will send in paperwork for work to be completed .  - TWG: 10. Pregravid BMI 34. Expect 11-20 lbs.    Follow-up in 2-3 weeks for routine ob visit    Mary Giles MD, MHS  22

## 2022-12-26 ENCOUNTER — HOSPITAL ENCOUNTER (OUTPATIENT)
Facility: CLINIC | Age: 27
Discharge: HOME | End: 2022-12-26
Attending: STUDENT IN AN ORGANIZED HEALTH CARE EDUCATION/TRAINING PROGRAM | Admitting: STUDENT IN AN ORGANIZED HEALTH CARE EDUCATION/TRAINING PROGRAM
Payer: COMMERCIAL

## 2022-12-26 ENCOUNTER — MYC MEDICAL ADVICE (OUTPATIENT)
Dept: OBGYN | Facility: CLINIC | Age: 27
End: 2022-12-26

## 2022-12-26 VITALS
DIASTOLIC BLOOD PRESSURE: 75 MMHG | SYSTOLIC BLOOD PRESSURE: 123 MMHG | HEART RATE: 96 BPM | TEMPERATURE: 97.3 F | RESPIRATION RATE: 16 BRPM

## 2022-12-26 PROCEDURE — 59025 FETAL NON-STRESS TEST: CPT | Mod: 59

## 2022-12-26 PROCEDURE — 59025 FETAL NON-STRESS TEST: CPT

## 2022-12-26 PROCEDURE — G0463 HOSPITAL OUTPT CLINIC VISIT: HCPCS | Mod: 25

## 2022-12-26 RX ORDER — METOCLOPRAMIDE 10 MG/1
10 TABLET ORAL EVERY 6 HOURS PRN
Status: DISCONTINUED | OUTPATIENT
Start: 2022-12-26 | End: 2022-12-26 | Stop reason: HOSPADM

## 2022-12-26 RX ORDER — PROCHLORPERAZINE 25 MG
25 SUPPOSITORY, RECTAL RECTAL EVERY 12 HOURS PRN
Status: DISCONTINUED | OUTPATIENT
Start: 2022-12-26 | End: 2022-12-26 | Stop reason: HOSPADM

## 2022-12-26 RX ORDER — ONDANSETRON 4 MG/1
4 TABLET, ORALLY DISINTEGRATING ORAL EVERY 6 HOURS PRN
Status: DISCONTINUED | OUTPATIENT
Start: 2022-12-26 | End: 2022-12-26 | Stop reason: HOSPADM

## 2022-12-26 RX ORDER — METOCLOPRAMIDE HYDROCHLORIDE 5 MG/ML
10 INJECTION INTRAMUSCULAR; INTRAVENOUS EVERY 6 HOURS PRN
Status: DISCONTINUED | OUTPATIENT
Start: 2022-12-26 | End: 2022-12-26 | Stop reason: HOSPADM

## 2022-12-26 RX ORDER — ONDANSETRON 2 MG/ML
4 INJECTION INTRAMUSCULAR; INTRAVENOUS EVERY 6 HOURS PRN
Status: DISCONTINUED | OUTPATIENT
Start: 2022-12-26 | End: 2022-12-26 | Stop reason: HOSPADM

## 2022-12-26 RX ORDER — PROCHLORPERAZINE MALEATE 5 MG
10 TABLET ORAL EVERY 6 HOURS PRN
Status: DISCONTINUED | OUTPATIENT
Start: 2022-12-26 | End: 2022-12-26 | Stop reason: HOSPADM

## 2022-12-26 ASSESSMENT — ACTIVITIES OF DAILY LIVING (ADL): ADLS_ACUITY_SCORE: 31

## 2022-12-26 NOTE — PLAN OF CARE
Deborah comes to the [facility] for evaluation of back and hip pain. She states her back pain is sometimes worse when sitting and if she stretches, her pain gets better. Denies LOF, bleeding or any other abnormality. Dr Giles was called and given full report on this patient.    SVE reveals closed/thick/high. Discharged patient to home. Discharge instructions were reviewed at length and Deborah knows to call if her symptoms worsen or she notices any other abnormality

## 2022-12-26 NOTE — TELEPHONE ENCOUNTER
12/23/22 last prenatal visit w Dr Giles  29w5d today    Routing pt mychart message to provider to advise. - do you want her to be assessed in [facility]?    Consuelo Mitchell RN on 12/26/2022 at 11:09 AM

## 2022-12-26 NOTE — TELEPHONE ENCOUNTER
Dr Gilse would like pt assessed in [facility]    Called pt and instructed based on her explanation. She said it is not constant but verbalized understanding. She will see when  is coming home and will head in.     [facility] notified pt coming.    Consuelo Mitchell RN on 12/26/2022 at 12:22 PM

## 2022-12-26 NOTE — DISCHARGE INSTRUCTIONS
Discharge Instruction for Undelivered Patients      You were seen for:  Pre term labor assessment  We Consulted: Dr Giles  You had (Test or Medicine):Monitoring, and sterile vag exam     Diet:   Drink 8 to 12 glasses of liquids (milk, juice, water) every day.  You may eat meals and snacks.     Activity:  Call your doctor or nurse midwife if your baby is moving less than usual.     Call your provider if you notice:  Swelling in your face or increased swelling in your hands or legs.  Headaches that are not relieved by Tylenol (acetaminophen).  Changes in your vision (blurring: seeing spots or stars.)  Nausea (sick to your stomach) and vomiting (throwing up).   Weight gain of 5 pounds or more per week.  Heartburn that doesn't go away.  Signs of bladder infection: pain when you urinate (use the toilet), need to go more often and more urgently.  The bag of willson (rupture of membranes) breaks, or you notice leaking in your underwear.  Bright red blood in your underwear.  Abdominal (lower belly) or stomach pain.  For first baby: Contractions (tightening) less than 5 minutes apart for one hour or more.  Second (plus) baby: Contractions (tightening) less than 10 minutes apart and getting stronger.  *If less than 34 weeks: Contractions (tightening) more than 6 times in one hour.  Increase or change in vaginal discharge (note the color and amount)  Other:     Follow-up:  As scheduled in the clinic

## 2022-12-28 ENCOUNTER — INFUSION THERAPY VISIT (OUTPATIENT)
Dept: INFUSION THERAPY | Facility: CLINIC | Age: 27
End: 2022-12-28
Attending: INTERNAL MEDICINE
Payer: COMMERCIAL

## 2022-12-28 ENCOUNTER — LAB (OUTPATIENT)
Dept: LAB | Facility: CLINIC | Age: 27
End: 2022-12-28
Payer: COMMERCIAL

## 2022-12-28 VITALS
RESPIRATION RATE: 18 BRPM | TEMPERATURE: 97.4 F | OXYGEN SATURATION: 96 % | DIASTOLIC BLOOD PRESSURE: 73 MMHG | SYSTOLIC BLOOD PRESSURE: 108 MMHG | HEART RATE: 84 BPM

## 2022-12-28 DIAGNOSIS — D50.9 IRON DEFICIENCY ANEMIA DURING PREGNANCY: ICD-10-CM

## 2022-12-28 DIAGNOSIS — R42 DIZZINESS: Primary | ICD-10-CM

## 2022-12-28 DIAGNOSIS — O99.019 IRON DEFICIENCY ANEMIA DURING PREGNANCY: ICD-10-CM

## 2022-12-28 DIAGNOSIS — O09.93 SUPERVISION OF HIGH RISK PREGNANCY IN THIRD TRIMESTER: ICD-10-CM

## 2022-12-28 LAB
GESTATIONAL GTT 1 HR POST DOSE: 209 MG/DL (ref 60–179)
GESTATIONAL GTT 2 HR POST DOSE: 198 MG/DL (ref 60–154)
GESTATIONAL GTT 3 HR POST DOSE: 135 MG/DL (ref 60–139)
GLUCOSE P FAST SERPL-MCNC: 87 MG/DL (ref 60–94)

## 2022-12-28 PROCEDURE — 96365 THER/PROPH/DIAG IV INF INIT: CPT

## 2022-12-28 PROCEDURE — 250N000011 HC RX IP 250 OP 636: Performed by: INTERNAL MEDICINE

## 2022-12-28 PROCEDURE — 36415 COLL VENOUS BLD VENIPUNCTURE: CPT

## 2022-12-28 PROCEDURE — 258N000003 HC RX IP 258 OP 636: Performed by: INTERNAL MEDICINE

## 2022-12-28 PROCEDURE — 82951 GLUCOSE TOLERANCE TEST (GTT): CPT

## 2022-12-28 PROCEDURE — 82952 GTT-ADDED SAMPLES: CPT

## 2022-12-28 RX ORDER — DIPHENHYDRAMINE HYDROCHLORIDE 50 MG/ML
50 INJECTION INTRAMUSCULAR; INTRAVENOUS
Status: CANCELLED
Start: 2023-01-04

## 2022-12-28 RX ORDER — EPINEPHRINE 1 MG/ML
0.3 INJECTION, SOLUTION INTRAMUSCULAR; SUBCUTANEOUS EVERY 5 MIN PRN
Status: CANCELLED | OUTPATIENT
Start: 2023-01-04

## 2022-12-28 RX ORDER — ALBUTEROL SULFATE 0.83 MG/ML
2.5 SOLUTION RESPIRATORY (INHALATION)
Status: CANCELLED | OUTPATIENT
Start: 2023-01-04

## 2022-12-28 RX ORDER — HEPARIN SODIUM,PORCINE 10 UNIT/ML
5 VIAL (ML) INTRAVENOUS
Status: CANCELLED | OUTPATIENT
Start: 2023-01-04

## 2022-12-28 RX ORDER — METHYLPREDNISOLONE SODIUM SUCCINATE 125 MG/2ML
125 INJECTION, POWDER, LYOPHILIZED, FOR SOLUTION INTRAMUSCULAR; INTRAVENOUS
Status: CANCELLED
Start: 2023-01-04

## 2022-12-28 RX ORDER — HEPARIN SODIUM (PORCINE) LOCK FLUSH IV SOLN 100 UNIT/ML 100 UNIT/ML
5 SOLUTION INTRAVENOUS
Status: CANCELLED | OUTPATIENT
Start: 2023-01-04

## 2022-12-28 RX ORDER — MEPERIDINE HYDROCHLORIDE 25 MG/ML
25 INJECTION INTRAMUSCULAR; INTRAVENOUS; SUBCUTANEOUS EVERY 30 MIN PRN
Status: CANCELLED | OUTPATIENT
Start: 2023-01-04

## 2022-12-28 RX ORDER — ALBUTEROL SULFATE 90 UG/1
1-2 AEROSOL, METERED RESPIRATORY (INHALATION)
Status: CANCELLED
Start: 2023-01-04

## 2022-12-28 RX ADMIN — FERRIC CARBOXYMALTOSE INJECTION 750 MG: 50 INJECTION, SOLUTION INTRAVENOUS at 14:35

## 2022-12-28 RX ADMIN — SODIUM CHLORIDE 250 ML: 9 INJECTION, SOLUTION INTRAVENOUS at 14:35

## 2022-12-28 ASSESSMENT — PAIN SCALES - GENERAL: PAINLEVEL: NO PAIN (0)

## 2022-12-28 NOTE — PROGRESS NOTES
Infusion Nursing Note:  Deborah Swartz presents today for injectafer 2/2.    Patient seen by provider today: No   present during visit today: Not Applicable.    Note: Feeling a little less fatigued after first dose of injectafer.    Intravenous Access:  Peripheral IV placed.    Treatment Conditions:  Not Applicable.    Post Infusion Assessment:  Patient tolerated infusion without incident.  Patient observed for 30 minutes post injectafer per protocol.  Blood return noted pre and post infusion.  Site patent and intact, free from redness, edema or discomfort.  No evidence of extravasations.  Access discontinued per protocol.     Discharge Plan:   AVS to patient via MYCHART.  Patient will return prn for next appointment.   Patient discharged in stable condition accompanied by: self.  Departure Mode: Ambulatory.      Justino Vega RN

## 2022-12-29 DIAGNOSIS — O24.410 DIET CONTROLLED GESTATIONAL DIABETES MELLITUS (GDM) IN THIRD TRIMESTER: Primary | ICD-10-CM

## 2023-01-01 ASSESSMENT — PATIENT HEALTH QUESTIONNAIRE - PHQ9
SUM OF ALL RESPONSES TO PHQ QUESTIONS 1-9: 5
SUM OF ALL RESPONSES TO PHQ QUESTIONS 1-9: 5
10. IF YOU CHECKED OFF ANY PROBLEMS, HOW DIFFICULT HAVE THESE PROBLEMS MADE IT FOR YOU TO DO YOUR WORK, TAKE CARE OF THINGS AT HOME, OR GET ALONG WITH OTHER PEOPLE: SOMEWHAT DIFFICULT

## 2023-01-02 ENCOUNTER — VIRTUAL VISIT (OUTPATIENT)
Dept: PSYCHIATRY | Facility: CLINIC | Age: 28
End: 2023-01-02
Payer: COMMERCIAL

## 2023-01-02 DIAGNOSIS — F41.1 GENERALIZED ANXIETY DISORDER: ICD-10-CM

## 2023-01-02 DIAGNOSIS — F33.0 MILD EPISODE OF RECURRENT MAJOR DEPRESSIVE DISORDER (H): Primary | ICD-10-CM

## 2023-01-02 PROCEDURE — 99214 OFFICE O/P EST MOD 30 MIN: CPT | Mod: 95 | Performed by: NURSE PRACTITIONER

## 2023-01-02 NOTE — PATIENT INSTRUCTIONS
1.  Continue sertraline 50 mg daily  2.  Consider increasing dose of sertraline to 75 mg daily if depression worsens  3.  Talk therapy, when able to, to learn ways to cope with life stressors    Continue all other medications as reviewed per electronic medical record today.   Safety plan reviewed. To the Emergency Department as needed or call after hours crisis line at [phone] or [phone]. [place] Crisis Text Line. Text [place] to 830020 or Suicide LifeLine Chat: Any[employer].org/chat/  To schedule individual or family therapy, call [facility] at [phone]  Schedule an appointment with me in February or sooner as needed. Call [facility] at [phone] to schedule.  Follow up with primary care provider as planned or for acute medical concerns.  Call the psychiatric nurse line with medication questions or concerns at [phone]  MyChart may be used to communicate with your provider, but this is not intended to be used for emergencies.    Crisis Resources:    National Suicide Prevention Lifeline: 974.852.5910 (TTY: 241.596.8091). Call anytime for help.  (www.suicidepreventionlifeline.org)  National [place] on Mental Illness (www.priyank.org): 908.636.9727 or 336-667-0469.   Mental Health Association (www.mentalhealth.org): 819.228.4792 or 567-263-2627.  [place] Crisis Text [place]: Text [place] to 742247  Suicide LifeLine Chat: suicideOakland Single Parents' Network.org/chat

## 2023-01-02 NOTE — PROGRESS NOTES
"Deborah is a 27 year old who is being evaluated via a billable video visit.      How would you like to obtain your AVS? MyChart  If the video visit is dropped, the invitation should be resent by: Text to cell phone: [phone]  Will anyone else be joining your video visit? No        Video-Visit Details    Type of service:  Video Visit     Originating Location (pt. Location): Home    Distant Location (provider location):  Off-site  Platform used for Video Visit: [facility]      Answers for HPI/ROS submitted by the patient on 2023  If you checked off any problems, how difficult have these problems made it for you to do your work, take care of things at home, or get along with other people?: Somewhat difficult  PHQ9 TOTAL SCORE: 5      Time start: 8:24 AM  Stop time: 8:49 AM             Outpatient Psychiatric Progress Note    Name: Deborah Swartz   : 1995                    Primary Care Provider: DANYELLE LIU MD   Therapist: None    PHQ-9 scores:  PHQ-9 SCORE 10/23/2022 2022 2023   PHQ-9 Total Score - - -   PHQ-9 Total Score [facility] 6 (Mild depression) 11 (Moderate depression) 5 (Mild depression)   PHQ-9 Total Score 6 11 5       JASIEL-7 scores:  JASIEL-7 SCORE 2020 2022 10/23/2022   Total Score - 3 (minimal anxiety) 4 (minimal anxiety)   Total Score 3 3 4       Patient Identification:    Patient is a 27 year old year old,   White Not  or  female  who presents for return visit with me.  Patient is currently employed part time. Patient attended the session alone. Patient prefers to be called: \" Deborah\".    Current medications include: ASPIRIN LOW DOSE 81 MG EC tablet, daily  folic acid (FOLVITE) 1 MG tablet, Take 1 tablet (1 mg) by mouth daily  meclizine (ANTIVERT) 25 MG tablet, TAKE 1 TABLET(25 MG) BY MOUTH THREE TIMES DAILY AS NEEDED FOR DIZZINESS  Prenatal Vit-Fe Sulfate-FA-DHA (PRENATAL VITAMIN/MIN +DHA PO),   sertraline (ZOLOFT) 50 MG tablet, Take 1 tablet (50 mg) by mouth " daily    No current facility-administered medications on file prior to visit.       The [place] Prescription Monitoring Program has been reviewed and there are no concerns about diversionary activity for controlled substances at this time.      I was able to review most recent Primary Care Provider, specialty provider, and therapy visit notes that I have access to.     Today, patient reports  that she has been gettingg 6 hours of light sleep.  She has gestational diabetes.  She is getting over the flu.  She has been filling out paperwork applications to obtain financial resources for her and her family.  Recently she got fired from [employer] and now is filing for unemployment.  She is working part-time as a groomer.  Family and friends are helping Deborah and her family with financial resources including food.  She Deborah notices increased dizziness and fatigue when she is in motion.  A heart monitor had been placed on her but has fallen off and has not been read yet.  She tells me she feels slightly less depressed and hopeless.  She is discouraged about how difficult it has been to manage financial resources.       has a past medical history of Asthma, Depressive disorder (N/A), Mental disorder, Papanicolaou smear of cervix with atypical squamous cells of undetermined significance (ASC-US) (09/22/2016), and Postpartum depression.    Social history updates:    Deborah lives with her  and children.  Her estimated delivery date is in March.  She was let go from [employer] and is planning to file for unemployment resources.    Substance use updates:    No alcohol use reported  Tobacco use: No    Vital Signs:   LMP 06/01/2022 (Approximate)     Labs:    Most recent laboratory results reviewed and no new labs.     Mental Status Examination:  Appearance: awake, alert, casual dress and mild distress  Attitude: cooperative  Eye Contact:  adequate  Gait and Station: No assistive Devices used and No dizziness or  falls  Psychomotor Behavior:  intact station, gait and muscle tone  Oriented to:  time, person, and place  Attention Span and Concentration:  Normal  Speech:   clear, coherent and Speaks: English  Mood:  depressed  Affect:  mood congruent  Associations:  no loose associations  Thought Process:  goal oriented  Thought Content:  no evidence of suicidal ideation or homicidal ideation, no auditory hallucinations present and no visual hallucinations present  Recent and Remote Memory:  intact Not formally assessed. No amnesia.  Fund of Knowledge: appropriate  Insight:  good  Judgment:  intact  Impulse Control:  intact    Suicide Risk Assessment:  Today Deborah Swartz reports no thoughts to harm themself or others. In addition, there are notable risk factors for self-harm, including anxiety, comorbid medical condition of Pregnancy and mood change. However, risk is mitigated by commitment to family, history of seeking help when needed, future oriented, denies suicidal intent or plan and denies homicidal ideation, intent, or plan. Therefore, based on all available evidence including the factors cited above, Deborah Swartz does not appear to be at imminent risk for self-harm, does not meet criteria for a 72-hr hold, and therefore remains appropriate for ongoing outpatient level of care.  A thorough assessment of risk factors related to suicide and self-harm have been reviewed and are noted above. The patient convincingly denies suicidality on several occasions. Local community safety resources printed and reviewed for patient to use if needed. There was no deceit detected, and the patient presented in a manner that was believable.     DSM5 Diagnosis:  296.31 (F33.0) Major Depressive Disorder, Recurrent Episode, Mild _ and With melancholic features  300.02 (F41.1) Generalized Anxiety Disorder    Medical comorbidities include:   Patient Active Problem List    Diagnosis Date Noted     Iron deficiency anemia during pregnancy  12/20/2022     Priority: Medium     Muscle weakness (generalized) 11/07/2022     Priority: Medium     Low lying placenta nos or without hemorrhage, second trimester 11/02/2022     Priority: Medium     Circumvallate placenta in second trimester 11/02/2022     Priority: Medium     Supervision of high-risk pregnancy 08/15/2022     Priority: Medium     EDC byL: 3/8  Sp: Jonathan   NIPS: desires  Hx preE w/o SF, on ASA, vertigo, episodic weakness  Hx PP depression Sertraline 25mg. Failed GCT   Hx: LTCSx2; desires sterilization, 2nd pregnancy preeclampsia; vit D deficiency  UVV- serial growth, BPP 32w   Iron infusions  BMI 34   Bivalent: declines  Flu: 9/26/22  Tdap: 12/23  GCT/hgb/plts: 189, 11.7, 148  LL placenta. 32wk reeval. Circumvallate placenta w/irreg cord insert>MFM  Dizziness/extreme fatigue/muscle weakness-Neuro, Cards       Encounter for insertion of mirena IUD 08/09/2022     Priority: Medium     Encounter for pregnancy test, result unknown 08/09/2022     Priority: Medium     Cervical cancer screening 08/09/2022     Priority: Medium     Vaginal yeast infection 08/09/2022     Priority: Medium     Other migraine without status migrainosus, not intractable 06/29/2022     Priority: Medium     Fatigue, unspecified type 06/29/2022     Priority: Medium     Mild episode of recurrent major depressive disorder (H) 06/07/2022     Priority: Medium     Dizziness 06/07/2022     Priority: Medium     Encounter for IUD insertion 06/07/2022     Priority: Medium     Vitamin D deficiency 06/07/2022     Priority: Medium     Vitamin B 12 deficiency 06/07/2022     Priority: Medium     Tobacco use disorder 03/13/2017     Priority: Medium     Papanicolaou smear of cervix with atypical squamous cells of undetermined significance (ASC-US) 09/22/2016     Priority: Medium     9/22/16: ASCUS Pap. Plan pap in 1 year.   10/30/17 Lost to follow-up for pap tracking  8/9/22 NIL Pap, Neg HPV. Plan pap in 1 year.   8/17/2022 Pt viewed result on  [facility].         Mild intermittent asthma 05/21/2015     Priority: Medium       Assessment:    Deborah Swartz is working towards accessing financial resources for her and her family.  Anxiety and depression have slightly lessened with the use of sertraline and she will continue that dose at 50 mg daily.  I asked her to consider increasing that dose of sertraline to 75 mg daily if her depression symptoms worsen.  Talk therapy is encouraged, when she is able to, for learning skills to manage life adjustments and stressors.  At no time did she endorse any thoughts to want to end her life..    Medication side effects and alternatives were reviewed. Health promotion activities recommended and reviewed today. All questions addressed. Education and counseling completed regarding risks and benefits of medications and psychotherapy options.    Treatment Plan:        1.  Continue sertraline 50 mg daily    2.  Consider increasing dose of sertraline to 75 mg daily if depression worsens    3.  Talk therapy, when able to, to learn ways to cope with life stressors        Continue all other medications as reviewed per electronic medical record today.     Safety plan reviewed. To the Emergency Department as needed or call after hours crisis line at [phone] or [phone]. [place] Crisis Text Line. Text [place] to 524248 or Suicide LifeLine Chat: suicidepreventionlifeline.org/chat/    To schedule individual or family therapy, call [facility] at [phone]    Schedule an appointment with me in February or sooner as needed. Call [facility] at [phone] to schedule.    Follow up with primary care provider as planned or for acute medical concerns.    Call the psychiatric nurse line with medication questions or concerns at [phone]    MyChart may be used to communicate with your provider, but this is not intended to be used for emergencies.    Crisis Resources:    National Suicide Prevention  Lifeline: 709.382.3641 (TTY: 917.714.1759). Call anytime for help.  (www.suicidepreventionlifeline.org)  National [place] on Mental Illness (www.priyank.org): 534.425.5173 or 972-673-8164.   Mental Health Association (www.mentalhealth.org): 566.167.6901 or 701-982-4182.  [place] Crisis Text Line: Text [place] to 319379  Suicide LifeLine Chat: suicideRafter.org/chat    Administrative Billing:   Time spent with patient includes counseling and coordination of care regarding above diagnoses and treatment plan.    Patient Status:  Patient will continue to be seen for ongoing consultation and stabilization.    Signed:   MILLER Florence-BC   Psychiatry

## 2023-01-05 ENCOUNTER — HOSPITAL ENCOUNTER (OUTPATIENT)
Dept: ULTRASOUND IMAGING | Facility: CLINIC | Age: 28
Discharge: HOME | End: 2023-01-05
Attending: OBSTETRICS & GYNECOLOGY
Payer: COMMERCIAL

## 2023-01-05 ENCOUNTER — PRENATAL OFFICE VISIT (OUTPATIENT)
Dept: OBGYN | Facility: CLINIC | Age: 28
End: 2023-01-05
Payer: COMMERCIAL

## 2023-01-05 ENCOUNTER — VIRTUAL VISIT (OUTPATIENT)
Dept: EDUCATION SERVICES | Facility: CLINIC | Age: 28
End: 2023-01-05
Payer: COMMERCIAL

## 2023-01-05 ENCOUNTER — OFFICE VISIT (OUTPATIENT)
Dept: MATERNAL FETAL MEDICINE | Facility: CLINIC | Age: 28
End: 2023-01-05
Attending: OBSTETRICS & GYNECOLOGY

## 2023-01-05 VITALS — SYSTOLIC BLOOD PRESSURE: 110 MMHG | BODY MASS INDEX: 37.02 KG/M2 | DIASTOLIC BLOOD PRESSURE: 62 MMHG | WEIGHT: 209 LBS

## 2023-01-05 DIAGNOSIS — O34.219 HISTORY OF CESAREAN SECTION COMPLICATING PREGNANCY: ICD-10-CM

## 2023-01-05 DIAGNOSIS — O09.93 SUPERVISION OF HIGH RISK PREGNANCY IN THIRD TRIMESTER: Primary | ICD-10-CM

## 2023-01-05 DIAGNOSIS — O35.8XX0 UMBILICAL VEIN ABNORMALITY AFFECTING PREGNANCY, SINGLE OR UNSPECIFIED FETUS: Primary | ICD-10-CM

## 2023-01-05 DIAGNOSIS — O35.8XX0 UMBILICAL VEIN ABNORMALITY AFFECTING PREGNANCY, SINGLE OR UNSPECIFIED FETUS: ICD-10-CM

## 2023-01-05 DIAGNOSIS — O24.410 DIET CONTROLLED GESTATIONAL DIABETES MELLITUS (GDM) IN THIRD TRIMESTER: Primary | ICD-10-CM

## 2023-01-05 DIAGNOSIS — O26.899 PREGNANCY COMPLICATED BY UMBILICAL CORD VARIX IN ANTEPARTUM PERIOD: ICD-10-CM

## 2023-01-05 PROCEDURE — 76816 OB US FOLLOW-UP PER FETUS: CPT

## 2023-01-05 PROCEDURE — 99212 OFFICE O/P EST SF 10 MIN: CPT | Performed by: STUDENT IN AN ORGANIZED HEALTH CARE EDUCATION/TRAINING PROGRAM

## 2023-01-05 PROCEDURE — 76816 OB US FOLLOW-UP PER FETUS: CPT | Mod: 26 | Performed by: OBSTETRICS & GYNECOLOGY

## 2023-01-05 PROCEDURE — G0108 DIAB MANAGE TRN  PER INDIV: HCPCS | Mod: 95 | Performed by: DIETITIAN, REGISTERED

## 2023-01-05 RX ORDER — LANCETS
EACH MISCELLANEOUS
Qty: 100 EACH | Refills: 3 | Status: SHIPPED | OUTPATIENT
Start: 2023-01-05 | End: 2023-09-12

## 2023-01-05 NOTE — PROGRESS NOTES
Prenatal Care (31w1d)    Deborah Swartz is a 27 year old  at 31w1d by LMP c/w 11w1d US presenting for routine prenatal care. Would like weight lifting restrictions- lifting small dogs. Denies LOF, VB, ctx. +FM.     Conditions affecting pregnancy:  - Low lying placenta, circumvallate  - Hx CS x 2  - Hx preE w/o SF    - migraine HA   - BMI 34  - MDD      Objective- see flow sheet    Deborah Swartz is a 27 year old  at 31w1d presenting for routine ob visit      ICD-10-CM    1. Supervision of high risk pregnancy in third trimester  O09.93       2. Pregnancy complicated by umbilical cord varix in antepartum period  O99.891 CANCELED: US Fetal Biophys Prof w/o Non Stress Test           - GDMA- meeting with diabetes educators today. Has not started checking blood sugars, will be getting supplies.  - Umbilical vein varix- follows with MFM. Growth US today. Weekly BPP starting next week with MFM  - Hx preE:    - ASA- rx provided   - Baseline preE labs wnl   - Hx of CS x 2: Repeat CS+BS 3/2, orders placed.   - Depression: on Zoloft, seeing psych   - Sterilization: certain about this decision. Federal tubal papers signed.   - First tri labs wnl. Anatomy US with low lying placenta, circumvallate placenta. Level II with UVV. S/p influenza. Declines COVID vax. Third tri labs: failed GCT, GTT.   - Discussed routine precautions. Letter sent for lifting restrictions.   - TW. Pregravid BMI 34. Expect 11-20 lbs.    Follow-up in 3 weeks for routine ob visit.    Mary Giles MD, S  23

## 2023-01-05 NOTE — PROGRESS NOTES
The patient was seen for an ultrasound in the M[facility]etal [facility] Center at the [facility] today.  For a detailed report of the ultrasound examination, please see the ultrasound report which can be found under the imaging tab.    Jenny Rae MD  , OB/GYN  Maternal-Fetal Medicine  [phone] (Pager)

## 2023-01-05 NOTE — LETTER
[facility] [address] [id number] [phone]    To whom it may concern:     This letter is with regard to lifting restrictions this patient has due to the following medical conidtions     Deborah Swartz  YOB: 1995    Date of treatment: 1/5/2023.    Work related?  NO  Encounter Diagnoses   Name Primary?     Supervision of high risk pregnancy in third trimester Yes              The patient is Able to return to work.  When the patient returns to work, the following restrictions apply until 3/8/23    The recommendations for lifting after 20 weeks of pregnancy are no more than 10lbs if lifting at waist height if doing this repetitively (more than 1 total hour during your work day) or no more than 17lbs if doing this intermittently (short durations, less than one total hour during your work day)      Make critical judgements:  YES  Please reach out with any questions or concerns     Sincerely,    Electronically signed by:     Mary Giles MD, S  01/05/23

## 2023-01-05 NOTE — PROGRESS NOTES
Diabetes Self-Management Education & Support      SUBJECTIVE/OBJECTIVE:  Presents for education related to gestational diabetes.    Type of Service: Telephone Visit    Originating Location (Patient Location): Home  Distant Location (Provider Location): [facility]  Mode of Communication:  Telephone    Telephone Visit Start Time: 2:00  Telephone Visit End Time (telephone visit stop time): 2:30 (patient had to leave early for another appointment)    How would patient like to obtain AVS? MyChart      Accompanied by: Self  Diabetes management related comments/concerns: has 3 and 1 year old  Gestational weeks: 31 weeks 1 day  Hospital planned for delivery: [facility]  Number of previous pregnancies: 3 (2 full term)  Had any babies over 9 lbs: Yes (first might have been over 9 lb)  Previously had Gestational Diabetes: No  Have you ever had thyroid problems or taken thyroid medication?: No  Heart disease, mitral valve prolapse or rheumatic fever?: (!) Yes (had a slight arythmia- was wearing monitor but lost box to send back)  Hypertension : No (has family hx)  High Cholesterol: No  High Triglycerides: No    Cultural Influences/Ethnic Background:  Not  or     Estimated Date of Delivery: Mar 8, 2023    1 hour OGTT  Lab Results   Component Value Date    GLU1 189 (H) 12/23/2022         3 hour OGTT    Fasting  Lab Results   Component Value Date    GTTGF 87 12/28/2022       1 hour  Lab Results   Component Value Date    GTTG1 209 (H) 12/28/2022       2 hour  Lab Results   Component Value Date    GTTG2 198 (H) 12/28/2022       3 hour  Lab Results   Component Value Date    GTTG3 135 12/28/2022       Lifestyle and Health Behaviors:  Pre-pregnancy weight (lbs): 150  Exercise:: Yes (works 16 hours- standing and walking)  Barrier to exercise: Physical limitation  Meals include: Breakfast, Lunch, Dinner  Breakfast: 8 am: doesn't usually eat breakfast - will have tea or water or soda (Dr. pepper-  now started buying pepsi zero- sometimes needs fizzy to take meds)  Lunch: 12:00: mac and cheese OR grilled cheese OR eggs  Dinner: 5- 7 pm: Chicken Dank, OR spaghetti OR tacos OR pizza  Snacks: usually doesn't snack, might have buttered toast - or cheese burger  Beverages: Diet soda, Water, Tea  Biggest challenges to healthy eating: Finances  Pre-maritza vitamin?: Yes  Supplements?: Yes  List supplements currently taking: folic acid  Experiencing nausea?: Yes (has some dizzy spells that sometimes come with nausea)  Experiencing heartburn?: Yes (occasionally)    Healthy Coping:  Stage of change: ACTION (Actively working towards change)    Current Management:  Taking medications for gestational diabetes?: No  Difficulty affording diabetes medication?: No  Difficulty affording diabetes testing supplies?: No    ASSESSMENT:  Discussed BG testing and which foods have carbohydrates. Pasta is a large part of Arturos diet, recommended to limit to one cup.     INTERVENTION:    Educational topics covered today:   Using a Blood Glucose Monitor, Blood Glucose Goals, Logging and Interpreting Glucose Results, Ketone Testing, When to Call a Diabetes Educator or OB Provider      Educational materials provided today:   Judy Understanding Gestational Diabetes  GDM Log Book  Sharps Disposal  Care After Delivery    Pt verbalized understanding of concepts discussed and recommendations provided today.     PLAN:  To cover at next visit: GDM diagnosis, pathophysiology, Risks and Complications of GDM, Means of controlling GDM, Healthy Eating During Pregnancy, Counting Carbohydrates, Meal Planning for GDM, and Physical Activity    Check glucose 4 times daily, before breakfast and 1 hour after each meal.     Check Ketones daily for one week, if negative, reduce testing to once a week.        Physical activity recommended: did not discuss today.    Meal plan (did not have time to discuss today): 2-3 carbs at breakfast, 3-4 carbs at lunch,  3-4 carbs at supper, 1-2 carbs at 3 snacks a day.  Follow consistent CHO meal plan, eat CHO and protein/fat at all meals/snacks.    Call/e-mail/MyChart message diabetes educator if 3 or more blood sugars are above the goal in 1 week, if ketones are positive, or with questions/concerns.    GE Ruvalcaba [facility]  Triage: [phone]  [phone]    Time Spent: 30 minutes  Encounter Type: Individual    Any diabetes medication dose changes were made via the CDE Protocol and Collaborative Practice Agreement with the patient's referring provider. A copy of this encounter was shared with the provider.

## 2023-01-05 NOTE — LETTER
1/5/2023         RE: Deborah Swartz  [address] [place] [address]        Dear Colleague,    Thank you for referring your patient, Deborah Swartz, to the [facility]. Please see a copy of my visit note below.    Diabetes Self-Management Education & Support      SUBJECTIVE/OBJECTIVE:  Presents for education related to gestational diabetes.    Type of Service: Telephone Visit    Originating Location (Patient Location): Home  Distant Location (Provider Location): [facility]  Mode of Communication:  Telephone    Telephone Visit Start Time: 2:00  Telephone Visit End Time (telephone visit stop time): 2:30 (patient had to leave early for another appointment)    How would patient like to obtain AVS? MyChart      Accompanied by: Self  Diabetes management related comments/concerns: has 3 and 1 year old  Gestational weeks: 31 weeks 1 day  [facility]  Number of previous pregnancies: 3 (2 full term)  Had any babies over 9 lbs: Yes (first might have been over 9 lb)  Previously had Gestational Diabetes: No  Have you ever had thyroid problems or taken thyroid medication?: No  Heart disease, mitral valve prolapse or rheumatic fever?: (!) Yes (had a slight arythmia- was wearing monitor but lost box to send back)  Hypertension : No (has family hx)  High Cholesterol: No  High Triglycerides: No    Cultural Influences/Ethnic Background:  Not  or     Estimated Date of Delivery: Mar 8, 2023    1 hour OGTT  Lab Results   Component Value Date    GLU1 189 (H) 12/23/2022         3 hour OGTT    Fasting  Lab Results   Component Value Date    GTTGF 87 12/28/2022       1 hour  Lab Results   Component Value Date    GTTG1 209 (H) 12/28/2022       2 hour  Lab Results   Component Value Date    GTTG2 198 (H) 12/28/2022       3 hour  Lab Results   Component Value Date    GTTG3 135 12/28/2022       Lifestyle and Health  Behaviors:  Pre-pregnancy weight (lbs): 150  Exercise:: Yes (works 16 hours- standing and walking)  Barrier to exercise: Physical limitation  Meals include: Breakfast, Lunch, Dinner  Breakfast: 8 am: doesn't usually eat breakfast - will have tea or water or soda (Dr. escalante- now started buying pepsi zero- sometimes needs fizzy to take meds)  Lunch: 12:00: mac and cheese OR grilled cheese OR eggs  Dinner: 5- 7 pm: Chicken Dank, OR spaghetti OR tacos OR pizza  Snacks: usually doesn't snack, might have buttered toast - or cheese burger  Beverages: Diet soda, Water, Tea  Biggest challenges to healthy eating: Finances  Pre-maritza vitamin?: Yes  Supplements?: Yes  List supplements currently taking: folic acid  Experiencing nausea?: Yes (has some dizzy spells that sometimes come with nausea)  Experiencing heartburn?: Yes (occasionally)    Healthy Coping:  Stage of change: ACTION (Actively working towards change)    Current Management:  Taking medications for gestational diabetes?: No  Difficulty affording diabetes medication?: No  Difficulty affording diabetes testing supplies?: No    ASSESSMENT:  Discussed BG testing and which foods have carbohydrates. Pasta is a large part of Deborah's diet, recommended to limit to one cup.     INTERVENTION:    Educational topics covered today:   Using a Blood Glucose Monitor, Blood Glucose Goals, Logging and Interpreting Glucose Results, Ketone Testing, When to Call a Diabetes Educator or OB Provider      Educational materials provided today:   Judy Understanding Gestational Diabetes  GDM Log Book  Sharps Disposal  Care After Delivery    Pt verbalized understanding of concepts discussed and recommendations provided today.     PLAN:  To cover at next visit: GDM diagnosis, pathophysiology, Risks and Complications of GDM, Means of controlling GDM, Healthy Eating During Pregnancy, Counting Carbohydrates, Meal Planning for GDM, and Physical Activity    Check glucose 4 times daily, before  breakfast and 1 hour after each meal.     Check Ketones daily for one week, if negative, reduce testing to once a week.        Physical activity recommended: did not discuss today.    Meal plan (did not have time to discuss today): 2-3 carbs at breakfast, 3-4 carbs at lunch, 3-4 carbs at supper, 1-2 carbs at 3 snacks a day.  Follow consistent CHO meal plan, eat CHO and protein/fat at all meals/snacks.    Call/e-mail/[employer] message diabetes educator if 3 or more blood sugars are above the goal in 1 week, if ketones are positive, or with questions/concerns.    GE Ruvalcaba [facility]  Triage: [phone]  [phone]    Time Spent: 30 minutes  Encounter Type: Individual    Any diabetes medication dose changes were made via the CDE Protocol and Collaborative Practice Agreement with the patient's referring provider. A copy of this encounter was shared with the provider.

## 2023-01-09 ENCOUNTER — TELEPHONE (OUTPATIENT)
Dept: OBGYN | Facility: CLINIC | Age: 28
End: 2023-01-09

## 2023-01-09 DIAGNOSIS — R42 DIZZINESS: ICD-10-CM

## 2023-01-09 NOTE — TELEPHONE ENCOUNTER
Type of surgery: RPT CS BS  Location of surgery: [facility]  Date and time of surgery: 3/2/2023 7:15a  Surgeon: RODRIGO Rojas  Pre-Op Appt Date: HOSPITAL  Post-Op Appt Date: 4/17/2023 2p   Packet sent out: MAILED 1/9/2023  Pre-cert/Authorization completed:  TBD  Date: 1/9/2023 [place]/Ksenia Clark  Surgery Scheduler        Additional Instructions for the Case  ERAS BAG GIVEN No  ERAS INSTRUCTIONS EXPLAINED No    ASSIST: Randell    H&P TO BE COMPLETED BY:   Surgeon  FOR H&P TO BE DONE BY SURGEON   UPDATE VISIT ON DATE AT HOSPITAL  SAME DAY/OBSERVATION/INPATIENT: ADMIT  EQUIPMENT: Ligasure  VENDOR NEEDED AT CASE: None  IF IUD WHAT BRAND None  LABS/SPECIAL INSTRUCTIONS: None  TIME OFF WORK: 8 weeks  ESTIMATED DOCTOR TIME IN MINUTES 60mins  POST OP TO BE SCHEDULED 6 WEEKS AFTER SX. APPOINTMENT LENGTH IN MINUTES 15    WOULD YOU LIKE YOUR PATIENT PRE-PROCEDURE COVID TESTED? No

## 2023-01-10 ENCOUNTER — OFFICE VISIT (OUTPATIENT)
Dept: MATERNAL FETAL MEDICINE | Facility: CLINIC | Age: 28
End: 2023-01-10
Attending: OBSTETRICS & GYNECOLOGY

## 2023-01-10 ENCOUNTER — HOSPITAL ENCOUNTER (OUTPATIENT)
Dept: ULTRASOUND IMAGING | Facility: CLINIC | Age: 28
Discharge: HOME | End: 2023-01-10
Attending: OBSTETRICS & GYNECOLOGY
Payer: COMMERCIAL

## 2023-01-10 DIAGNOSIS — O35.8XX0 UMBILICAL VEIN ABNORMALITY AFFECTING PREGNANCY, SINGLE OR UNSPECIFIED FETUS: ICD-10-CM

## 2023-01-10 DIAGNOSIS — O35.8XX0 UMBILICAL VEIN ABNORMALITY AFFECTING PREGNANCY, SINGLE OR UNSPECIFIED FETUS: Primary | ICD-10-CM

## 2023-01-10 PROCEDURE — 76819 FETAL BIOPHYS PROFIL W/O NST: CPT | Mod: 26 | Performed by: OBSTETRICS & GYNECOLOGY

## 2023-01-10 PROCEDURE — 76819 FETAL BIOPHYS PROFIL W/O NST: CPT

## 2023-01-10 RX ORDER — MECLIZINE HYDROCHLORIDE 25 MG/1
TABLET ORAL
Qty: 30 TABLET | Refills: 0 | Status: SHIPPED | OUTPATIENT
Start: 2023-01-10 | End: 2023-01-13

## 2023-01-10 NOTE — PROGRESS NOTES
"Please see \"Imaging\" tab under Chart Review for full details.    Debbie Ward MD  Maternal Fetal Medicine    "
[facility] PATIENTS

## 2023-01-10 NOTE — TELEPHONE ENCOUNTER
Prescription approved per [facility] Refill Protocol.  Cornelia Angeles RN  [facility]

## 2023-01-10 NOTE — NURSING NOTE
Patient presents to [facility] for BPP. Positive fetal movement. Denies LOF, vaginal bleeding or cramping/contractions. Diagnosed with GDM but states she is having difficult with her insurance and [facility] pharmacy in getting the testing supplies. Plans to contact  pharmacy in attempt to get supplies. Encouraged patient to call primary if she is unable to get testing supplies.    Annelise Ivy RN

## 2023-01-11 ENCOUNTER — PATIENT OUTREACH (OUTPATIENT)
Dept: CARE COORDINATION | Facility: CLINIC | Age: 28
End: 2023-01-11

## 2023-01-11 ENCOUNTER — TELEPHONE (OUTPATIENT)
Dept: CARDIOLOGY | Facility: CLINIC | Age: 28
End: 2023-01-11
Payer: COMMERCIAL

## 2023-01-11 NOTE — TELEPHONE ENCOUNTER
Message from chart prep team:  Carolina Malik LPN [facility] Heart Team 2  Hello,   This pt is seeing [facility] on Friday and her leadless monitor is in transit and a preliminary report is unavailable (per Nu Paulson). Does the appt need to be rescheduled?   Thanks, Carolina SKaty         Called [facility] for follow up. Per staff, they received an empty box on 12/26/2022 and reported it by email to Christie.     Will message Team 1 to review and see if a new order and new monitor is needed.

## 2023-01-11 NOTE — PROGRESS NOTES
Clinic Care Coordination Contact  Program: [facility]   County:  [facility] Case #:  [facility] Worker:   [place] #:   Subscriber #:   Renewal:  Date Applied:     FRW Outreach:   1/11/23: FRW called pt. Pt's SNAP is approved. FRW will get the rest of verifications in and FRW and pt will connect in 3-4 weeks.   12/21/22: FRW called pt and emailed the applications for pt to complete. FRW will reach out to pt in 3 weeks.     Health Insurance:      Referral/Screening:  Financial Resource Worker Screening     [facility] Benefits  Is patient requesting help applying for [facility] benefits?: Yes (SNAP and Cash Assistance)  Have you recently applied for any [facility] benefits?: Yes  What was applied for? : SNAP  Application date:: September 2022- Denied  How many people in your household?: 4  Do you buy/eat food together?: Yes  What is the monthly gross income for the household (wages, social security, workers comp, and pension)? : 0 (Pt's  recently lost his job- pt is on a leave with no benefits or income.)     Insurance:  [place]?: Yes  Is this an insurance renewal?: No  Is this a new insurance application request?:  (unsure)     Any other information for the FRW?: Patient has outstanding [facility] bills.     GIANCARLO Savage  Clinic Care Coordination  [facility] Clinics: [facility], [place], [place], and [facility]  Phone: [phone]

## 2023-01-12 ENCOUNTER — VIRTUAL VISIT (OUTPATIENT)
Dept: EDUCATION SERVICES | Facility: CLINIC | Age: 28
End: 2023-01-12
Payer: COMMERCIAL

## 2023-01-12 DIAGNOSIS — O24.410 DIET CONTROLLED GESTATIONAL DIABETES MELLITUS (GDM) IN THIRD TRIMESTER: ICD-10-CM

## 2023-01-12 PROCEDURE — G0108 DIAB MANAGE TRN  PER INDIV: HCPCS | Performed by: DIETITIAN, REGISTERED

## 2023-01-12 NOTE — PROGRESS NOTES
Diabetes Self-Management Education & Support    SUBJECTIVE/OBJECTIVE:  Presents for education related to gestational diabetes.    Accompanied by: Self  Diabetes management related comments/concerns: has 3 and 1 year old  Gestational weeks: 32 weeks 1 day  Number of previous pregnancies: 3 (2 full term)  Had any babies over 9 lbs: Yes (first might have been over 9 lb)  Previously had Gestational Diabetes: No  Have you ever had thyroid problems or taken thyroid medication?: No  Heart disease, mitral valve prolapse or rheumatic fever?: (!) Yes (had a slight arythmia- was wearing monitor but lost box to send back)  Hypertension : No (has family hx)  High Cholesterol: No  High Triglycerides: No    Cultural Influences/Ethnic Background:  Not  or     LMP 2022 (Approximate)     Weight gain 59 lbs at 32 weeks gestation.    Wt Readings from Last 3 Encounters:   23 94.8 kg (209 lb)   22 90.7 kg (200 lb)   22 89.9 kg (198 lb 1.6 oz)     Estimated Date of Delivery: Mar 8, 2023    Lifestyle and Health Behaviors:  Pre-pregnancy weight (lbs): 150  Exercise:: Yes (works 16 hours- standing and walking)  Barrier to exercise: Physical limitation  Meal planning/habits: Avoiding sweets  Meals include: Breakfast, Lunch, Dinner  Breakfast: 8 am: doesn't usually eat breakfast - will have tea or water or soda (Dr. escalante- now started buying pepsi zero- sometimes needs fizzy to take meds)  Lunch: 12:00: mac and cheese OR grilled cheese OR eggs  Dinner: 5- 7 pm: Chicken Dank, OR spaghetti OR tacos OR pizza  Snacks: usually doesn't snack, might have buttered toast - or cheese burger  Beverages: Diet soda, Water, Tea  Biggest challenges to healthy eating: Finances  Pre-maritza vitamin?: Yes  Supplements?: Yes  List supplements currently taking: folic acid  Experiencing nausea?: Yes (has some dizzy spells that sometimes come with nausea)  Experiencing heartburn?: Yes (occasionally)    Healthy  "Coping:  Emotional response to diabetes: Uncertain  Stage of change: ACTION (Actively working towards change)    Current Management:  Taking medications for gestational diabetes?: No  Difficulty affording diabetes medication?: No  Difficulty affording diabetes testing supplies?: No      ASSESSMENT:    Blood Glucose/Ketone Log: not available.  Pt not currently testing, reports went to  testing supplies at [facility] and was told insurance did not cover. CDE re-routed rx to [facility] today, pt will go there this afternoon. Told pt if still having trouble with insurance denial, can  OTC testing supplies at the clinic pharmacy at a reasonable cost.  Pt has been working on diet, but states \"it doesn't agree with me\"- having more loose stools with adding vegetables. Has been avoiding the regular soda and is reducing other carbs as well.   Scheduled another telephone follow up to review blood sugars, as pt has not been using   MyChart (\"can't figure it out\"). Reports mother has diabetes, is out in [place] but can a resource for answering questions about testing, etc.     INTERVENTION:  Educational topics covered today:  What to expect after delivery, Future testing for Type 2 diabetes (2 hour OGTT at 6 week post-partum check-up and annual fasting blood glucose level), Risk of GDM and planning ahead for future pregnancies, Recommended lifestyle interventions for reducing the risk of Type 2 Diabetes, When to Call a Diabetes Educator or OB Provider    Educational Materials provided today:  [place] Preventing Diabetes    PLAN:  Check glucose 4 times daily.  Check ketones once a week when readings are consistently negative.  Continue with recommended physical activity.  Continue to follow recommended meal plan: 2-3 carbs at breakfast, 3-4 carbs at lunch, 3-4 carbs at supper, 1-2 carbs at snacks.  Follow consistent CHO meal plan, eat CHO and protein/fat at all meals/snacks.    Call/e-mail/MyChart " message diabetes educator if 3 or more blood sugars are above the goal in 1 week or if ketones are positive.    Carolina Chavez RD, [facility]  Diabetes     Time Spent: 30 minutes  Encounter Type: Individual    Any diabetes medication dose changes were made via the CDE Protocol and Collaborative Practice Agreement with the patient's OB/GYN provider3. A copy of this encounter was shared with the provider.

## 2023-01-12 NOTE — LETTER
1/12/2023         RE: Deborah Swartz  [address] [place] [address]        Dear Colleague,    Thank you for referring your patient, Deborah Swartz, to the [facility]. Please see a copy of my visit note below.    Diabetes Self-Management Education & Support    SUBJECTIVE/OBJECTIVE:  Presents for education related to gestational diabetes.    Accompanied by: Self  Diabetes management related comments/concerns: has 3 and 1 year old  Gestational weeks: 32 weeks 1 day  Number of previous pregnancies: 3 (2 full term)  Had any babies over 9 lbs: Yes (first might have been over 9 lb)  Previously had Gestational Diabetes: No  Have you ever had thyroid problems or taken thyroid medication?: No  Heart disease, mitral valve prolapse or rheumatic fever?: (!) Yes (had a slight arythmia- was wearing monitor but lost box to send back)  Hypertension : No (has family hx)  High Cholesterol: No  High Triglycerides: No    Cultural Influences/Ethnic Background:  Not  or     LMP 06/01/2022 (Approximate)     Weight gain 59 lbs at 32 weeks gestation.    Wt Readings from Last 3 Encounters:   01/05/23 94.8 kg (209 lb)   12/23/22 90.7 kg (200 lb)   11/25/22 89.9 kg (198 lb 1.6 oz)     Estimated Date of Delivery: Mar 8, 2023    Lifestyle and Health Behaviors:  Pre-pregnancy weight (lbs): 150  Exercise:: Yes (works 16 hours- standing and walking)  Barrier to exercise: Physical limitation  Meal planning/habits: Avoiding sweets  Meals include: Breakfast, Lunch, Dinner  Breakfast: 8 am: doesn't usually eat breakfast - will have tea or water or soda (Dr. escalante- now started buying pepsi zero- sometimes needs fizzy to take meds)  Lunch: 12:00: mac and cheese OR grilled cheese OR eggs  Dinner: 5- 7 pm: Chicken Dank, OR spaghetti OR tacos OR pizza  Snacks: usually doesn't snack, might have buttered toast - or cheese burger  Beverages: Diet soda, Water, Tea  Biggest challenges to healthy eating:  "Finances  Pre- vitamin?: Yes  Supplements?: Yes  List supplements currently taking: folic acid  Experiencing nausea?: Yes (has some dizzy spells that sometimes come with nausea)  Experiencing heartburn?: Yes (occasionally)    Healthy Coping:  Emotional response to diabetes: Uncertain  Stage of change: ACTION (Actively working towards change)    Current Management:  Taking medications for gestational diabetes?: No  Difficulty affording diabetes medication?: No  Difficulty affording diabetes testing supplies?: No      ASSESSMENT:    Blood Glucose/Ketone Log: not available.  Pt not currently testing, reports went to  testing supplies at [facility] and was told insurance did not cover. CDE re-routed rx to [facility] today, pt will go there this afternoon. Told pt if still having trouble with insurance denial, and  OTC testing supplies at the clinic pharmacy at a reasonable cost.  Pt has been working on diet, but states \"it doesn't agree with me\"- having more loose stools with adding vegetables. Has been avoiding the regular soda and is reducing other carbs as well.   Scheduled another telephone follow up to review blood sugars, as pt has not been using   MyChart (\"can't figure it out\"). Reports mother has diabetes, is out in [place] but can a resource for answering questions about testing, etc.     INTERVENTION:  Educational topics covered today:  What to expect after delivery, Future testing for Type 2 diabetes (2 hour OGTT at 6 week post-partum check-up and annual fasting blood glucose level), Risk of GDM and planning ahead for future pregnancies, Recommended lifestyle interventions for reducing the risk of Type 2 Diabetes, When to Call a Diabetes Educator or OB Provider    Educational Materials provided today:  [place] Preventing Diabetes    PLAN:  Check glucose 4 times daily.  Check ketones once a week when readings are consistently negative.  Continue with recommended physical " activity.  Continue to follow recommended meal plan: 2-3 carbs at breakfast, 3-4 carbs at lunch, 3-4 carbs at supper, 1-2 carbs at snacks.  Follow consistent CHO meal plan, eat CHO and protein/fat at all meals/snacks.    Call/e-mail/MyChart message diabetes educator if 3 or more blood sugars are above the goal in 1 week or if ketones are positive.    Carolina Chavez RD, [facility]  Diabetes     Time Spent: 30 minutes  Encounter Type: Individual    Any diabetes medication dose changes were made via the CDE Protocol and Collaborative Practice Agreement with the patient's OB/GYN provider3. A copy of this encounter was shared with the provider.

## 2023-01-12 NOTE — PATIENT INSTRUCTIONS
Your 1 week follow-up Diabetes Education telephone visit is scheduled on 1/24 at 11:00.     1. Check blood sugar 4 times a day, before breakfast and 1 hour after the start of each meal.     2. Check urine ketones when you wake up every morning for 7 days. If negative everyday, reduce testing to once a week.    3. Follow the recommended meal plan: eat something every 2-3 hours, include protein/fat and carbohydrate at every meal and snack, have 2-3 carbs at breakfast, 3-4 carbs at lunch, 3-4 carbs at supper, 1-2 carbs at 3 snacks per day.     4. Add activity to every day, try walking or being active after each meal to help control blood sugar levels.    5.Call or send a [employer]LM Com message to your educator if 3 or more blood sugars are above goal in 1 week, you have an elevated ketone result (trace or higher), or with questions or concerns.    Carolina Chavez RD, JAMES, [facility]  Diabetes     [place] Diabetes Education and Nutrition Services for the [facility]:  For Your Diabetes Education or Nutrition Appointments Call:  [phone]   For Diabetes Education and Nutrition Related Questions:   Phone: [phone]  Send TLM Com Message   If you need a medication refill please contact your pharmacy. Please allow 3 business days for your refills to be completed.

## 2023-01-13 ENCOUNTER — TELEPHONE (OUTPATIENT)
Dept: FAMILY MEDICINE | Facility: CLINIC | Age: 28
End: 2023-01-13

## 2023-01-13 ENCOUNTER — TELEPHONE (OUTPATIENT)
Dept: PSYCHIATRY | Facility: CLINIC | Age: 28
End: 2023-01-13

## 2023-01-13 DIAGNOSIS — R42 DIZZINESS: ICD-10-CM

## 2023-01-13 NOTE — TELEPHONE ENCOUNTER
Patient needs meclizine 25 mg. We cannot rich  [facility] pharmacy for transfer.     Candice Castro  Pharmacy Technician  [facility]  Pharmacy  [phone]    Thank you

## 2023-01-13 NOTE — TELEPHONE ENCOUNTER
Sertraline 50 mg    Last Written Prescription Date  01/02/23  Last Fill Quantity: 30, refills : 1  Last Office Visit with [facility] [place] primary care provider:      Candice Castro  Pharmacy Technician  [facility]  Pharmacy  [phone]    Thank you    PS: we cannot transfer from [facility]

## 2023-01-16 RX ORDER — MECLIZINE HYDROCHLORIDE 25 MG/1
TABLET ORAL
Qty: 30 TABLET | Refills: 0 | Status: SHIPPED | OUTPATIENT
Start: 2023-01-16 | End: 2024-06-25

## 2023-01-17 ENCOUNTER — OFFICE VISIT (OUTPATIENT)
Dept: MATERNAL FETAL MEDICINE | Facility: CLINIC | Age: 28
End: 2023-01-17
Attending: OBSTETRICS & GYNECOLOGY

## 2023-01-17 ENCOUNTER — HOSPITAL ENCOUNTER (OUTPATIENT)
Dept: ULTRASOUND IMAGING | Facility: CLINIC | Age: 28
Discharge: HOME | End: 2023-01-17
Attending: OBSTETRICS & GYNECOLOGY
Payer: COMMERCIAL

## 2023-01-17 DIAGNOSIS — O35.8XX0 UMBILICAL VEIN ABNORMALITY AFFECTING PREGNANCY, SINGLE OR UNSPECIFIED FETUS: Primary | ICD-10-CM

## 2023-01-17 DIAGNOSIS — O35.8XX0 UMBILICAL VEIN ABNORMALITY AFFECTING PREGNANCY, SINGLE OR UNSPECIFIED FETUS: ICD-10-CM

## 2023-01-17 PROCEDURE — 76819 FETAL BIOPHYS PROFIL W/O NST: CPT

## 2023-01-17 PROCEDURE — 76819 FETAL BIOPHYS PROFIL W/O NST: CPT | Mod: 26 | Performed by: OBSTETRICS & GYNECOLOGY

## 2023-01-17 PROCEDURE — 76816 OB US FOLLOW-UP PER FETUS: CPT | Mod: 26 | Performed by: OBSTETRICS & GYNECOLOGY

## 2023-01-17 NOTE — NURSING NOTE
Patient presents to M for RL2/BPP. Positive fetal movement. Denies LOF, vaginal bleeding or cramping/contractions. Patient states that BS levels have been staying within normal limits as she is using diet to control. SBAR given to MFALYCE MD, see their note in Epic.

## 2023-01-17 NOTE — PROGRESS NOTES
The patient was seen for an ultrasound in the Maternal[facility]etal [facility] Center at the [facility] today.  For a detailed report of the ultrasound examination, please see the ultrasound report which can be found under the imaging tab.    Jenny Rae MD  , OB/GYN  Maternal-Fetal Medicine  [phone] (Pager)

## 2023-01-17 NOTE — TELEPHONE ENCOUNTER
RN received instructions from Nati Jett CNP asking RN to clarify if the patient needed refills for her Zoloft and if so which pharmacy to submit the refill request to.      1.  RN attempted to phone the patient.  There was no answer.  RN unable to LVM because this patient voice mailbox has not been set up yet.      2.  RN sent this patient a [employer] message asking the above indicated questions.  Awaiting response.

## 2023-01-20 ENCOUNTER — PATIENT OUTREACH (OUTPATIENT)
Dept: CARE COORDINATION | Facility: CLINIC | Age: 28
End: 2023-01-20
Payer: COMMERCIAL

## 2023-01-20 NOTE — PROGRESS NOTES
Clinic Care Coordination Contact  [facility]/Voicemail       Clinical Data: Care Coordinator Outreach  Outreach attempted x 1.  Left message on patient's voicemail with call back information and requested return call.  Plan: Care Coordinator will try to reach patient again in 1 month.    Nohelia Iniguez  [facility]  Clinic Care Coordinator  [facility]'s [facility]  [phone]  [email or web address]

## 2023-01-24 ENCOUNTER — MYC MEDICAL ADVICE (OUTPATIENT)
Dept: EDUCATION SERVICES | Facility: CLINIC | Age: 28
End: 2023-01-24

## 2023-01-24 ENCOUNTER — VIRTUAL VISIT (OUTPATIENT)
Dept: EDUCATION SERVICES | Facility: CLINIC | Age: 28
End: 2023-01-24
Payer: COMMERCIAL

## 2023-01-24 ENCOUNTER — HOSPITAL ENCOUNTER (OUTPATIENT)
Dept: ULTRASOUND IMAGING | Facility: CLINIC | Age: 28
Discharge: HOME | End: 2023-01-24
Attending: OBSTETRICS & GYNECOLOGY
Payer: COMMERCIAL

## 2023-01-24 ENCOUNTER — OFFICE VISIT (OUTPATIENT)
Dept: MATERNAL FETAL MEDICINE | Facility: CLINIC | Age: 28
End: 2023-01-24
Attending: OBSTETRICS & GYNECOLOGY

## 2023-01-24 DIAGNOSIS — O35.8XX0 UMBILICAL VEIN ABNORMALITY AFFECTING PREGNANCY, SINGLE OR UNSPECIFIED FETUS: Primary | ICD-10-CM

## 2023-01-24 DIAGNOSIS — O24.410 DIET CONTROLLED GESTATIONAL DIABETES MELLITUS (GDM) IN THIRD TRIMESTER: ICD-10-CM

## 2023-01-24 DIAGNOSIS — O35.8XX0 UMBILICAL VEIN ABNORMALITY AFFECTING PREGNANCY, SINGLE OR UNSPECIFIED FETUS: ICD-10-CM

## 2023-01-24 PROCEDURE — G0108 DIAB MANAGE TRN  PER INDIV: HCPCS | Performed by: DIETITIAN, REGISTERED

## 2023-01-24 PROCEDURE — 76819 FETAL BIOPHYS PROFIL W/O NST: CPT

## 2023-01-24 PROCEDURE — 76819 FETAL BIOPHYS PROFIL W/O NST: CPT | Mod: 26 | Performed by: OBSTETRICS & GYNECOLOGY

## 2023-01-24 NOTE — PATIENT INSTRUCTIONS
Plan to share your glucose and ketone information with diabetes education once a week, unless otherwise directed.     1. Check glucose 4 times daily, before breakfast daily and 1 hour after each meal, as recommended.    2. Check ketones daily or once a week after they have been negative for 7 days in a row. If ketones are elevated, let your diabetes educator know and continue to check daily until they are negative for 7 days in a row.    3. Continue with recommended physical activity.    4. Continue to follow recommended meal plan: 2-3 carbs at breakfast, 3-4 carbs at lunch, 3-4 carbs at supper, 1-2 carbs at snacks.  Follow consistent CHO meal plan, eat CHO and protein/fat at all meals/snacks.    5. Follow-up with OB doctor as recommended.    6. Call or MyChart message your diabetes educator if 3 or more blood sugars are above the goal in 1 week or if ketones are elevated (trace or above).     Carolina Chavez RD, LD, [facility]  Diabetes       AFTER YOU DELIVER:  - Continue with healthy eating and physical activity to get back to your pre-pregnancy weight.   - Have a follow-up 2-hour Glucose Tolerance Test at your 6-week post-partum check-up.   - Have your fasting blood sugar checked once a year.  - Plan ahead for future pregnancies - eat healthy, keep active, work with your doctor to check for gestational diabetes early on in the pregnancy and check blood sugars as recommended by your doctor.

## 2023-01-24 NOTE — PROGRESS NOTES
Diabetes Self-Management Education & Support    SUBJECTIVE/OBJECTIVE:  Presents for education related to gestational diabetes.    Accompanied by: Self  Diabetes management related comments/concerns: has 3 and 1 year old  Gestational weeks: 33 weeks 5 day  Number of previous pregnancies: 3 (2 full term)  Had any babies over 9 lbs: Yes (first might have been over 9 lb)  Previously had Gestational Diabetes: No  Have you ever had thyroid problems or taken thyroid medication?: No  Heart disease, mitral valve prolapse or rheumatic fever?: (!) Yes (had a slight arythmia- was wearing monitor but lost box to send back)  Hypertension : No (has family hx)  High Cholesterol: No  High Triglycerides: No    Cultural Influences/Ethnic Background:  Not  or       LMP 2022 (Approximate)     Weight gain >50 lbs at 33 weeks gestation.    Wt Readings from Last 3 Encounters:   23 94.8 kg (209 lb)   22 90.7 kg (200 lb)   22 89.9 kg (198 lb 1.6 oz)     Estimated Date of Delivery: Mar 8, 2023    Lifestyle and Health Behaviors:  Pre-pregnancy weight (lbs): 150  Exercise:: Yes (works 16 hours- standing and walking)  Barrier to exercise: Physical limitation  Meal planning/habits: Avoiding sweets  Meals include: Breakfast, Lunch, Dinner  Breakfast: 8 am: doesn't usually eat breakfast - will have tea or water or soda (Dr. escalante- now started buying pepsi zero- sometimes needs fizzy to take meds)  Lunch: 12:00: mac and cheese OR grilled cheese OR eggs  Dinner: 5- 7 pm: Chicken Dank, OR spaghetti OR tacos OR pizza  Snacks: usually doesn't snack, might have buttered toast - or cheese burger  Beverages: Diet soda, Water, Tea  Biggest challenges to healthy eating: Finances  Pre-maritza vitamin?: Yes  Supplements?: Yes  List supplements currently taking: folic acid  Experiencing nausea?: Yes (has some dizzy spells that sometimes come with nausea)  Experiencing heartburn?: Yes (occasionally)    Healthy  Coping:  Emotional response to diabetes: Uncertain  Stage of change: ACTION (Actively working towards change)    Current Management:  Taking medications for gestational diabetes?: No  Difficulty affording diabetes medication?: No  Difficulty affording diabetes testing supplies?: No    Blood Glucose/Ketone Log:    Date Ketones Fasting Post Breakfast Post Lunch Post Supper   1/14  84      1/15  77  82 98   1/16  94 105 83 87   1/17  97 88 104 105   1/18  83 78 123 132   1/19  90 86 99 89   1/20  82 78 78 87   1/21  72 105 101 136   1/22    81 140   1/23  78 94 86 99   1/24  88          ASSESSMENT:  Ketones: Not testing, pt not able to  previously from [employer]. CDE re-sent rx to [facility] for  later today.     Fasting blood glucoses: 90% in target.  After breakfast: 100% in target.  After lunch: 100% in target.  After dinner: 100% in target.    Pt reports doing well, trying to be careful with diet. No questions or concerns. Discussed ongoing follow up and BG review via [employer]. Pt had difficulty with [employer] in the past, thus walked thru instructions today on sending a message and attaching BG log. Pt verbalized understanding and was agreeable.     INTERVENTION:  Educational topics covered today:  What to expect after delivery, Future testing for Type 2 diabetes (2 hour OGTT at 6 week post-partum check-up and annual fasting blood glucose level), Risk of GDM and planning ahead for future pregnancies, Recommended lifestyle interventions for reducing the risk of Type 2 Diabetes, When to Call a Diabetes Educator or OB Provider    Educational Materials provided today:  [place] Preventing Diabetes    PLAN:  Check glucose 4 times daily.  Check ketones once a week when readings are consistently negative.  Continue with recommended physical activity.  Continue to follow recommended meal plan: 2-3 carbs at breakfast, 3-4 carbs at lunch, 3-4 carbs at supper, 1-2 carbs at snacks.  Follow consistent CHO meal  plan, eat CHO and protein/fat at all meals/snacks.    Call/e-mail/MyChart message diabetes educator if 3 or more blood sugars are above the goal in 1 week or if ketones are positive.    Carolina Chavez RD, [facility]  Diabetes     Time Spent: 30 minutes  Encounter Type: Individual    Any diabetes medication dose changes were made via the CDE Protocol and Collaborative Practice Agreement with the patient's OB/GYN provider. A copy of this encounter was shared with the provider.

## 2023-01-24 NOTE — LETTER
1/24/2023         RE: Deborah Swartz  [address] [place] [address]        Dear Colleague,    Thank you for referring your patient, Deborah Swartz, to the [facility]. Please see a copy of my visit note below.    Diabetes Self-Management Education & Support    SUBJECTIVE/OBJECTIVE:  Presents for education related to gestational diabetes.    Accompanied by: Self  Diabetes management related comments/concerns: has 3 and 1 year old  Gestational weeks: 33 weeks 5 day  Number of previous pregnancies: 3 (2 full term)  Had any babies over 9 lbs: Yes (first might have been over 9 lb)  Previously had Gestational Diabetes: No  Have you ever had thyroid problems or taken thyroid medication?: No  Heart disease, mitral valve prolapse or rheumatic fever?: (!) Yes (had a slight arythmia- was wearing monitor but lost box to send back)  Hypertension : No (has family hx)  High Cholesterol: No  High Triglycerides: No    Cultural Influences/Ethnic Background:  Not  or       LMP 06/01/2022 (Approximate)     Weight gain >50 lbs at 33 weeks gestation.    Wt Readings from Last 3 Encounters:   01/05/23 94.8 kg (209 lb)   12/23/22 90.7 kg (200 lb)   11/25/22 89.9 kg (198 lb 1.6 oz)     Estimated Date of Delivery: Mar 8, 2023    Lifestyle and Health Behaviors:  Pre-pregnancy weight (lbs): 150  Exercise:: Yes (works 16 hours- standing and walking)  Barrier to exercise: Physical limitation  Meal planning/habits: Avoiding sweets  Meals include: Breakfast, Lunch, Dinner  Breakfast: 8 am: doesn't usually eat breakfast - will have tea or water or soda (Dr. escalante- now started buying pepsi zero- sometimes needs fizzy to take meds)  Lunch: 12:00: mac and cheese OR grilled cheese OR eggs  Dinner: 5- 7 pm: Chicken Dank, OR spaghetti OR tacos OR pizza  Snacks: usually doesn't snack, might have buttered toast - or cheese burger  Beverages: Diet soda, Water, Tea  Biggest challenges to healthy eating:  Finances  Pre-maritza vitamin?: Yes  Supplements?: Yes  List supplements currently taking: folic acid  Experiencing nausea?: Yes (has some dizzy spells that sometimes come with nausea)  Experiencing heartburn?: Yes (occasionally)    Healthy Coping:  Emotional response to diabetes: Uncertain  Stage of change: ACTION (Actively working towards change)    Current Management:  Taking medications for gestational diabetes?: No  Difficulty affording diabetes medication?: No  Difficulty affording diabetes testing supplies?: No    Blood Glucose/Ketone Log:    Date Ketones Fasting Post Breakfast Post Lunch Post Supper     84      1/15  77  82 98     94 105 83 87     97 88 104 105     83 78 123 132     90 86 99 89     82 78 78 87     72 105 101 136       81 140     78 94 86 99     88          ASSESSMENT:  Ketones: Not testing, pt not able to  previously from [employer]. CDE re-sent rx to [facility] for  later today.     Fasting blood glucoses: 90% in target.  After breakfast: 100% in target.  After lunch: 100% in target.  After dinner: 100% in target.    Pt reports doing well, trying to be careful with diet. No questions or concerns. Discussed ongoing follow up and BG review via [employer]. Pt had difficulty with [employer] in the past, thus walked thru instructions today on sending a message and attaching BG log. Pt verbalized understanding and was agreeable.     INTERVENTION:  Educational topics covered today:  What to expect after delivery, Future testing for Type 2 diabetes (2 hour OGTT at 6 week post-partum check-up and annual fasting blood glucose level), Risk of GDM and planning ahead for future pregnancies, Recommended lifestyle interventions for reducing the risk of Type 2 Diabetes, When to Call a Diabetes Educator or OB Provider    Educational Materials provided today:  [facility] Diabetes    PLAN:  Check glucose 4 times daily.  Check ketones once a week when  readings are consistently negative.  Continue with recommended physical activity.  Continue to follow recommended meal plan: 2-3 carbs at breakfast, 3-4 carbs at lunch, 3-4 carbs at supper, 1-2 carbs at snacks.  Follow consistent CHO meal plan, eat CHO and protein/fat at all meals/snacks.    Call/e-mail/MyChart message diabetes educator if 3 or more blood sugars are above the goal in 1 week or if ketones are positive.    Carolina Chavez RD, [facility]  Diabetes     Time Spent: 30 minutes  Encounter Type: Individual    Any diabetes medication dose changes were made via the CDE Protocol and Collaborative Practice Agreement with the patient's OB/GYN provider. A copy of this encounter was shared with the provider.

## 2023-01-24 NOTE — NURSING NOTE
Patient reports positive fetal movement, no pain, no contractions, leaking of fluid, or bleeding.  Reports blood sugar values fasting WNL and  post prandial WNL with dietary control.  Patient denies headache, visual changes, nausea/vomiting, epigastric pain related to preeclampsia.   SBAR given to RAINER LADD, see their note in Epic.

## 2023-01-25 NOTE — PROGRESS NOTES
"Please see \"Imaging\" tab under \"Chart Review\" for details of today's US.    Melanie Conde, DO    "

## 2023-01-26 ENCOUNTER — HOSPITAL ENCOUNTER (OUTPATIENT)
Facility: CLINIC | Age: 28
Discharge: HOME | End: 2023-01-26
Attending: STUDENT IN AN ORGANIZED HEALTH CARE EDUCATION/TRAINING PROGRAM | Admitting: STUDENT IN AN ORGANIZED HEALTH CARE EDUCATION/TRAINING PROGRAM
Payer: COMMERCIAL

## 2023-01-26 VITALS
WEIGHT: 200 LBS | HEIGHT: 63 IN | TEMPERATURE: 97.1 F | DIASTOLIC BLOOD PRESSURE: 56 MMHG | RESPIRATION RATE: 16 BRPM | SYSTOLIC BLOOD PRESSURE: 115 MMHG | BODY MASS INDEX: 35.44 KG/M2

## 2023-01-26 PROBLEM — E11.9 DIABETES (H): Status: ACTIVE | Noted: 2023-01-26

## 2023-01-26 LAB
ALBUMIN MFR UR ELPH: 11.9 MG/DL (ref 1–14)
ALT SERPL W P-5'-P-CCNC: 10 U/L (ref 10–35)
AST SERPL W P-5'-P-CCNC: 17 U/L (ref 10–35)
CREAT SERPL-MCNC: 0.47 MG/DL (ref 0.51–0.95)
CREAT UR-MCNC: 77.7 MG/DL
ERYTHROCYTE [DISTWIDTH] IN BLOOD BY AUTOMATED COUNT: 17.6 % (ref 10–15)
GFR SERPL CREATININE-BSD FRML MDRD: >90 ML/MIN/1.73M2
HCT VFR BLD AUTO: 35.7 % (ref 35–47)
HGB BLD-MCNC: 11.7 G/DL (ref 11.7–15.7)
HOLD SPECIMEN: NORMAL
HOLD SPECIMEN: NORMAL
MCH RBC QN AUTO: 28.5 PG (ref 26.5–33)
MCHC RBC AUTO-ENTMCNC: 32.8 G/DL (ref 31.5–36.5)
MCV RBC AUTO: 87 FL (ref 78–100)
PLATELET # BLD AUTO: 106 10E3/UL (ref 150–450)
PROT/CREAT 24H UR: 0.15 MG/MG CR (ref 0–0.2)
RBC # BLD AUTO: 4.1 10E6/UL (ref 3.8–5.2)
URATE SERPL-MCNC: 5.1 MG/DL (ref 2.4–5.7)
WBC # BLD AUTO: 10.4 10E3/UL (ref 4–11)

## 2023-01-26 PROCEDURE — 84450 TRANSFERASE (AST) (SGOT): CPT | Performed by: STUDENT IN AN ORGANIZED HEALTH CARE EDUCATION/TRAINING PROGRAM

## 2023-01-26 PROCEDURE — 36415 COLL VENOUS BLD VENIPUNCTURE: CPT | Performed by: STUDENT IN AN ORGANIZED HEALTH CARE EDUCATION/TRAINING PROGRAM

## 2023-01-26 PROCEDURE — 59025 FETAL NON-STRESS TEST: CPT

## 2023-01-26 PROCEDURE — 84156 ASSAY OF PROTEIN URINE: CPT | Performed by: STUDENT IN AN ORGANIZED HEALTH CARE EDUCATION/TRAINING PROGRAM

## 2023-01-26 PROCEDURE — 82565 ASSAY OF CREATININE: CPT | Performed by: STUDENT IN AN ORGANIZED HEALTH CARE EDUCATION/TRAINING PROGRAM

## 2023-01-26 PROCEDURE — G0463 HOSPITAL OUTPT CLINIC VISIT: HCPCS | Mod: 25

## 2023-01-26 PROCEDURE — 84550 ASSAY OF BLOOD/URIC ACID: CPT | Performed by: STUDENT IN AN ORGANIZED HEALTH CARE EDUCATION/TRAINING PROGRAM

## 2023-01-26 PROCEDURE — 85027 COMPLETE CBC AUTOMATED: CPT | Performed by: STUDENT IN AN ORGANIZED HEALTH CARE EDUCATION/TRAINING PROGRAM

## 2023-01-26 PROCEDURE — 84460 ALANINE AMINO (ALT) (SGPT): CPT | Performed by: STUDENT IN AN ORGANIZED HEALTH CARE EDUCATION/TRAINING PROGRAM

## 2023-01-26 RX ORDER — METOCLOPRAMIDE 10 MG/1
10 TABLET ORAL EVERY 6 HOURS PRN
Status: DISCONTINUED | OUTPATIENT
Start: 2023-01-26 | End: 2023-01-26 | Stop reason: HOSPADM

## 2023-01-26 RX ORDER — METOCLOPRAMIDE HYDROCHLORIDE 5 MG/ML
10 INJECTION INTRAMUSCULAR; INTRAVENOUS EVERY 6 HOURS PRN
Status: DISCONTINUED | OUTPATIENT
Start: 2023-01-26 | End: 2023-01-26 | Stop reason: HOSPADM

## 2023-01-26 RX ORDER — PROCHLORPERAZINE 25 MG
25 SUPPOSITORY, RECTAL RECTAL EVERY 12 HOURS PRN
Status: DISCONTINUED | OUTPATIENT
Start: 2023-01-26 | End: 2023-01-26 | Stop reason: HOSPADM

## 2023-01-26 RX ORDER — PROCHLORPERAZINE MALEATE 5 MG
10 TABLET ORAL EVERY 6 HOURS PRN
Status: DISCONTINUED | OUTPATIENT
Start: 2023-01-26 | End: 2023-01-26 | Stop reason: HOSPADM

## 2023-01-26 RX ORDER — ONDANSETRON 2 MG/ML
4 INJECTION INTRAMUSCULAR; INTRAVENOUS EVERY 6 HOURS PRN
Status: DISCONTINUED | OUTPATIENT
Start: 2023-01-26 | End: 2023-01-26 | Stop reason: HOSPADM

## 2023-01-26 RX ORDER — ONDANSETRON 4 MG/1
4 TABLET, ORALLY DISINTEGRATING ORAL EVERY 6 HOURS PRN
Status: DISCONTINUED | OUTPATIENT
Start: 2023-01-26 | End: 2023-01-26 | Stop reason: HOSPADM

## 2023-01-26 ASSESSMENT — ACTIVITIES OF DAILY LIVING (ADL): ADLS_ACUITY_SCORE: 31

## 2023-01-26 NOTE — PROVIDER NOTIFICATION
Dr Giles called to check on pt. FHR, toco, s/s, BP's,resulted lab results given, still waiting on some lab results. New orders received to discharge pt home without waiting for further lab results.

## 2023-01-26 NOTE — TELEPHONE ENCOUNTER
Deborah's blood glucose and food record was reviewed on 1/24/23 at her follow up visit. She had a question on ketones that answered.    Grazyna Fam RDN, LD, [facility] [place]

## 2023-01-26 NOTE — DISCHARGE INSTRUCTIONS
Discharge Instruction for Undelivered Patients      You were seen for: blood pressure eval  We Consulted: Dr Giles  You had (Test or Medicine):fetal and uterine monitoring, lab work, bp checks     Diet:   You may eat meals and snacks.     Activity:  Call your doctor or nurse midwife if your baby is moving less than usual.     Call your provider if you notice:  Swelling in your face or increased swelling in your hands or legs.  Headaches that are not relieved by Tylenol (acetaminophen).  Changes in your vision (blurring: seeing spots or stars.)  Nausea (sick to your stomach) and vomiting (throwing up).   Weight gain of 5 pounds or more per week.  Heartburn that doesn't go away.  Signs of bladder infection: pain when you urinate (use the toilet), need to go more often and more urgently.  The bag of willson (rupture of membranes) breaks, or you notice leaking in your underwear.  Bright red blood in your underwear.  Abdominal (lower belly) or stomach pain.  For first baby: Contractions (tightening) less than 5 minutes apart for one hour or more.  Second (plus) baby: Contractions (tightening) less than 10 minutes apart and getting stronger.  *If less than 34 weeks: Contractions (tightening) more than 6 times in one hour.  Increase or change in vaginal discharge (note the color and amount)  Other:     Follow-up:  As scheduled in the clinic        
operating room

## 2023-01-26 NOTE — PLAN OF CARE
Pt arrives from home stating she's noticed more swelling in her hands and feet and had some blurred vision today. She has a hx of pre eclampsia so wanted to check on her BP's. Verbal consent received to place external monitors. Prenatal reviewed. Admission intake done.

## 2023-01-30 ENCOUNTER — VIRTUAL VISIT (OUTPATIENT)
Dept: PSYCHIATRY | Facility: CLINIC | Age: 28
End: 2023-01-30
Payer: COMMERCIAL

## 2023-01-30 VITALS — WEIGHT: 200 LBS | BODY MASS INDEX: 35.43 KG/M2

## 2023-01-30 DIAGNOSIS — F41.1 GENERALIZED ANXIETY DISORDER: ICD-10-CM

## 2023-01-30 DIAGNOSIS — F33.1 MODERATE RECURRENT MAJOR DEPRESSION (H): Primary | ICD-10-CM

## 2023-01-30 PROCEDURE — 99214 OFFICE O/P EST MOD 30 MIN: CPT | Mod: 95 | Performed by: NURSE PRACTITIONER

## 2023-01-30 ASSESSMENT — PAIN SCALES - GENERAL: PAINLEVEL: NO PAIN (0)

## 2023-01-30 NOTE — PATIENT INSTRUCTIONS
1.  Referral made to  psychiatry for treatment of history of postpartum depression  2.  Continue sertraline 50 mg daily  3.  Talk therapy, when able to, for learning skills to manage life adjustments and stressors    Continue all other medications as reviewed per electronic medical record today.   Safety plan reviewed. To the Emergency Department as needed or call after hours crisis line at [phone] or [phone]. [place] Crisis Text Line. Text [place] to 497428 or Suicide LifeLine Chat: [employer]org/chat/  To schedule individual or family therapy, call [facility] at [phone]  Schedule an appointment with me in 4 weeks or sooner as needed. Call [facility] at [phone] to schedule.  Follow up with primary care provider as planned or for acute medical concerns.  Call the psychiatric nurse line with medication questions or concerns at [phone]  MyChart may be used to communicate with your provider, but this is not intended to be used for emergencies.    Crisis Resources:    National Suicide Prevention Lifeline: 376.220.2169 (TTY: 427.476.3179). Call anytime for help.  (www.suicidepreventionlifeline.org)  National Hurricane on Mental Illness (www.priyank.org): 795.583.2380 or 478-153-7975.   Mental Health Association (www.mentalhealth.org): 569.392.4141 or 956-899-4619.  [place] Crisis Text Line: Text [place] to 209159  Suicide LifeLine Chat: suicideDaily Deals for Moms.org/chat

## 2023-01-30 NOTE — PROGRESS NOTES
"Deborah is a 27 year old who is being evaluated via a billable video visit.      How would you like to obtain your AVS? MyChart  If the video visit is dropped, the invitation should be resent by: Text to cell phone: [phone]  Will anyone else be joining your video visit? No      Andrewsamantha Merrittilya      Video-Visit Details    Type of service:  Video Visit     Originating Location (pt. Location): Home    Distant Location (provider location):  Off-site  Platform used for Video Visit: [facility]      Start time: 11:19 AM  Stop time: 11:42 AM           Outpatient Psychiatric Progress Note    Name: Deborah Swartz   : 1995                    Primary Care Provider: DANYELLE LIU MD   Therapist: None    PHQ-9 scores:  PHQ-9 SCORE 10/23/2022 2022 2023   PHQ-9 Total Score - - -   PHQ-9 Total Score MyChart 6 (Mild depression) 11 (Moderate depression) 5 (Mild depression)   PHQ-9 Total Score 6 11 5       JASIEL-7 scores:  JASIEL-7 SCORE 2020 2022 10/23/2022   Total Score - 3 (minimal anxiety) 4 (minimal anxiety)   Total Score 3 3 4       Patient Identification:    Patient is a 27 year old year old,   White Not  or  female  who presents for return visit with me.  Patient is currently employed part time. Patient attended the session alone. Patient prefers to be called: \" Deborah\".    Current medications include: acetone urine (KETOSTIX) test strip, Test first urine of every morning for 1 week. If consistently negative, test once weekly.  ASPIRIN LOW DOSE 81 MG EC tablet, daily  blood glucose (NO BRAND SPECIFIED) lancets standard, Test blood sugar 4 x/ day. Brand per insurance coverage.  blood glucose (NO BRAND SPECIFIED) test strip, Use to test blood sugar 4 times daily or as directed. To accompany: Blood Glucose Monitor Brand per insurance.  blood glucose monitoring (NO BRAND SPECIFIED) meter device kit, Use to test blood sugar 4 times daily or as directed.: Blood Glucose Monitor Brand: per " insurance.  folic acid (FOLVITE) 1 MG tablet, Take 1 tablet (1 mg) by mouth daily  meclizine (ANTIVERT) 25 MG tablet, TAKE 1 TABLET(25 MG) BY MOUTH THREE TIMES DAILY AS NEEDED FOR DIZZINESS Strength: 25 mg  Prenatal Vit-Fe Sulfate-FA-DHA (PRENATAL VITAMIN/MIN +DHA PO),   sertraline (ZOLOFT) 50 MG tablet, Take 1 tablet (50 mg) by mouth daily  thin (NO BRAND SPECIFIED) lancets, Use to test blood sugar 4 times daily or as directed. To accompany: Blood Glucose Monitor Brands: per insurance. (Patient not taking: Reported on 1/5/2023)    No current facility-administered medications on file prior to visit.       The [place] Prescription Monitoring Program has been reviewed and there are no concerns about diversionary activity for controlled substances at this time.      I was able to review most recent Primary Care Provider, specialty provider, and therapy visit notes that I have access to.     Today, patient reports  she did not have medications for 30 hours.  She had to be monitored for preeclamptics.  She has no insurance  Through.  [employer] .  She works at [employer].  She has paperwork for unemployment.    Her  is working on and off..    Lived in [place] for two years.   She has a new van.   She has better finances - tax returns have come .   She has a 4 year old and a 2 year old right now.   March 2  Is scheduled c section.   50 pound weight gain since pregnant.      has a past medical history of Asthma, Depressive disorder (N/A), Diabetes (H), Mental disorder, Papanicolaou smear of cervix with atypical squamous cells of undetermined significance (ASC-US) (09/22/2016), and Postpartum depression.    Social history updates:    Financial concerns continue.  She and her  got a new vehicle.  She continues to work p art time as a .    Substance use updates:    No alcohol use reported  Tobacco use: No    Vital Signs:   LMP 06/01/2022 (Approximate)     Labs:    Most recent laboratory results reviewed  and no new labs.     Mental Status Examination:  Appearance: awake, alert, casual dress and mild distress  Attitude: cooperative  Eye Contact:  adequate  Gait and Station: No assistive Devices used and No dizziness or falls  Psychomotor Behavior:  intact station, gait and muscle tone  Oriented to:  time, person, and place  Attention Span and Concentration:  Normal  Speech:   clear, coherent and Speaks: English  Mood:  anxious, sad  and depressed  Affect:  mood congruent  Associations:  no loose associations  Thought Process:  goal oriented  Thought Content:  no evidence of suicidal ideation or homicidal ideation, no auditory hallucinations present and no visual hallucinations present  Recent and Remote Memory:  intact Not formally assessed. No amnesia.  Fund of Knowledge: appropriate  Insight:  good  Judgment:  intact  Impulse Control:  intact    Suicide Risk Assessment:  Today Deborah Swartz reports no thoughts to harm themself or others. In addition, there are notable risk factors for self-harm, including anxiety, comorbid medical condition of pregnancy and mood change. However, risk is mitigated by commitment to family, history of seeking help when needed, future oriented, denies suicidal intent or plan and denies homicidal ideation, intent, or plan. Therefore, based on all available evidence including the factors cited above, Deborah Swartz does not appear to be at imminent risk for self-harm, does not meet criteria for a 72-hr hold, and therefore remains appropriate for ongoing outpatient level of care.  A thorough assessment of risk factors related to suicide and self-harm have been reviewed and are noted above. The patient convincingly denies suicidality on several occasions. Local community safety resources printed and reviewed for patient to use if needed. There was no deceit detected, and the patient presented in a manner that was believable.     DSM5 Diagnosis:  296.32 (F33.1) Major Depressive Disorder,  Recurrent Episode, Moderate _ and With melancholic features  300.02 (F41.1) Generalized Anxiety Disorder    Medical comorbidities include:   Patient Active Problem List    Diagnosis Date Noted     Diabetes (H) 01/26/2023     Priority: Medium     Iron deficiency anemia during pregnancy 12/20/2022     Priority: Medium     Muscle weakness (generalized) 11/07/2022     Priority: Medium     Low lying placenta nos or without hemorrhage, second trimester 11/02/2022     Priority: Medium     Circumvallate placenta in second trimester 11/02/2022     Priority: Medium     Supervision of high-risk pregnancy 08/15/2022     Priority: Medium     EDC byL: 3/8  Sp: Jonathan   NIPS: desires  Hx preE w/o SF, on ASA, vertigo, episodic weakness  Hx PP depression Sertraline 25mg. Failed GCT   Hx: LTCSx2; desires sterilization, 2nd pregnancy preeclampsia; vit D deficiency  UVV- serial growth, BPP 32w   Iron infusions  BMI 34   Bivalent: declines  Flu: 9/26/22  Tdap: 12/23  GCT/hgb/plts: 189, 11.7, 148  LL placenta. 32wk reeval. Circumvallate placenta w/irreg cord insert>MFM  Dizziness/extreme fatigue/muscle weakness-Neuro, Cards       Encounter for insertion of mirena IUD 08/09/2022     Priority: Medium     Encounter for pregnancy test, result unknown 08/09/2022     Priority: Medium     Cervical cancer screening 08/09/2022     Priority: Medium     Vaginal yeast infection 08/09/2022     Priority: Medium     Other migraine without status migrainosus, not intractable 06/29/2022     Priority: Medium     Fatigue, unspecified type 06/29/2022     Priority: Medium     Mild episode of recurrent major depressive disorder (H) 06/07/2022     Priority: Medium     Dizziness 06/07/2022     Priority: Medium     Encounter for IUD insertion 06/07/2022     Priority: Medium     Vitamin D deficiency 06/07/2022     Priority: Medium     Vitamin B 12 deficiency 06/07/2022     Priority: Medium     Tobacco use disorder 03/13/2017     Priority: Medium     Papanicolaou  smear of cervix with atypical squamous cells of undetermined significance (ASC-US) 2016     Priority: Medium     16: ASCUS Pap. Plan pap in 1 year.   10/30/17 Lost to follow-up for pap tracking  22 NIL Pap, Neg HPV. Plan pap in 1 year.   2022 Pt viewed result on MyChart.         Mild intermittent asthma 2015     Priority: Medium       Assessment:    Deborah Swartz has gained 50 pounds since becoming pregnant.  At one point it was thought that she was developing preeclampsia.  Once evaluated she was deemed able to return home.  She will have a scheduled  on .  Given her long history of postpartum depression, referral was made for her to be followed by  psychiatry.  I will continue to follow her until she has established care with them.  Deborah is in agreement with this plan.  Right now, she desires no changes in her dose of sertraline.  When she does not take it, she reports her depression, irritability and anxiety symptoms return..    Medication side effects and alternatives were reviewed. Health promotion activities recommended and reviewed today. All questions addressed. Education and counseling completed regarding risks and benefits of medications and psychotherapy options.    Treatment Plan:        1.  Referral made to  psychiatry for treatment of history of postpartum depression    2.  Continue sertraline 50 mg daily    3.  Talk therapy, when able to, for learning skills to manage life adjustments and stressors        Continue all other medications as reviewed per electronic medical record today.     Safety plan reviewed. To the Emergency Department as needed or call after hours crisis line at [phone] or [phone]. [place] Crisis Text Line. Text [place] to 234697 or Suicide LifeLine Chat: suicidepreventionlifeline.org/chat/    To schedule individual or family therapy, call [facility] at [phone]    Schedule an appointment with  me in 4 weeks or sooner as needed. Call [facility] at [phone] to schedule.    Follow up with primary care provider as planned or for acute medical concerns.    Call the psychiatric nurse line with medication questions or concerns at [phone]    MyChart may be used to communicate with your provider, but this is not intended to be used for emergencies.    Crisis Resources:    National Suicide Prevention Lifeline: 344.261.3310 (TTY: 202.401.8738). Call anytime for help.  (www.suicidepreventionlifeline.org)  National [place] on Mental Illness (www.priyank.org): 142.173.2162 or 646-279-3522.   Mental Health Association (www.mentalhealth.org): 959.439.6289 or 301-071-4619.  [place] Crisis Text Line: Text [place] to 066588  Suicide LifeLine Chat: suicideNewVoiceMedialine.org/chat    Administrative Billing:   Time spent with patient includes counseling and coordination of care regarding above diagnoses and treatment plan.    Patient Status:  Patient will continue to be seen for ongoing consultation and stabilization.    Signed:   MILLER Florence-BC   Psychiatry

## 2023-02-06 ENCOUNTER — OFFICE VISIT (OUTPATIENT)
Dept: CARDIOLOGY | Facility: CLINIC | Age: 28
End: 2023-02-06
Payer: COMMERCIAL

## 2023-02-06 ENCOUNTER — MYC MEDICAL ADVICE (OUTPATIENT)
Dept: EDUCATION SERVICES | Facility: CLINIC | Age: 28
End: 2023-02-06

## 2023-02-06 VITALS
SYSTOLIC BLOOD PRESSURE: 120 MMHG | OXYGEN SATURATION: 97 % | BODY MASS INDEX: 37.56 KG/M2 | WEIGHT: 212 LBS | HEART RATE: 72 BPM | HEIGHT: 63 IN | DIASTOLIC BLOOD PRESSURE: 80 MMHG

## 2023-02-06 DIAGNOSIS — R00.2 PALPITATIONS: Primary | ICD-10-CM

## 2023-02-06 PROCEDURE — 99214 OFFICE O/P EST MOD 30 MIN: CPT | Performed by: NURSE PRACTITIONER

## 2023-02-06 NOTE — LETTER
2023    DANYELLE LIU MD  [address] [place] [address]    RE: Deborah Swartz       Dear Colleague,     I had the pleasure of seeing Deborah Swartz in the [facility].  HPI:   Ms. Swartz is a 27 year old female with a past medical history including gestational DM (diet controlled), history of preeclampsia (emergency  at 39 weeks with second pregnancy), and Depression. She is currenlty , due date 3/2/23. She was evaluated by Dr. Moss 22 due to concerns for dizziness and weakness in setting of palpitations. Zio patch was consistent with occasional PAC's, fastest  and lowest 54 bpm. Echo negative for acute concerns. She was diagnosed with gestational DM Type II, currently diet controlled. She presents to Cardiology clinic for routine follow up.     She complains of palpitations about 1-2 times per week, lasting about 1-2 minutes. She denies dizziness or GARSIA associated with this. She notes LE edema and is currently on ASA. She has been doing weekly US with urine as well. 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 She denies fever, chills, lightheadedness, dizziness, chest pain, palpitations, SOB, GARSIA, PND, and orthopnea.     PAST MEDICAL HISTORY:  Past Medical History:   Diagnosis Date     Asthma      Depressive disorder N/A    Sometime in highschool     Diabetes (H)     GDDC     Mental disorder      Papanicolaou smear of cervix with atypical squamous cells of undetermined significance (ASC-US) 2016     Postpartum depression        FAMILY HISTORY:  Family History   Problem Relation Age of Onset     Hypertension Mother      GERD Mother      Congenital Anomalies Mother         Spinal Bifada     Anxiety Disorder Mother      Diabetes Type 2  Mother      Depression Father      Asthma Maternal Grandfather      Diabetes Type 2  Paternal Grandmother        SOCIAL HISTORY:  Social History      Socioeconomic History     Marital status:      Number of children: 2   Tobacco Use     Smoking status: Former     Packs/day: 0.50     Years: 1.00     Pack years: 0.50     Types: Cigarettes     Start date: 2013     Quit date: 2020     Years since quittin.4     Smokeless tobacco: Former     Tobacco comments:     Smoked for 5years, quit cold turkey for both pregnancies, picked it up again upon returning to work.   Vaping Use     Vaping Use: Never used   Substance and Sexual Activity     Alcohol use: Not Currently     Comment: none     Drug use: No     Sexual activity: Yes     Partners: Male     Birth control/protection: None   Other Topics Concern     Parent/sibling w/ CABG, MI or angioplasty before 65F 55M? No     Social Determinants of Health     Financial Resource Strain: High Risk     Difficulty of Paying Living Expenses: Very hard   Food Insecurity: Food Insecurity Present     Worried About Running Out of Food in the Last Year: Sometimes true     Ran Out of Food in the Last Year: Sometimes true   Transportation Needs: Unmet Transportation Needs     Lack of Transportation (Medical): Yes     Lack of Transportation (Non-Medical): Yes   Physical Activity: Sufficiently Active     Days of Exercise per Week: 7 days     Minutes of Exercise per Session: 30 min   Stress: Stress Concern Present     Feeling of Stress : To some extent   Social Connections: Moderately Isolated     Frequency of Communication with Friends and Family: More than three times a week     Frequency of Social Gatherings with Friends and Family: Never     Attends [employer] Services: Never     Active Member of Clubs or Organizations: No     Marital Status:    Housing Stability: High Risk     Unable to Pay for Housing in the Last Year: Yes     Number of Places Lived in the Last Year: 1     Unstable Housing in the Last Year: No       CURRENT MEDICATIONS:  Outpatient Medications Prior to Visit   Medication Sig Dispense Refill      acetone urine (KETOSTIX) test strip Test first urine of every morning for 1 week. If consistently negative, test once weekly. 50 strip 3     ASPIRIN LOW DOSE 81 MG EC tablet daily       blood glucose (NO BRAND SPECIFIED) lancets standard Test blood sugar 4 x/ day. Brand per insurance coverage. 200 each 1     blood glucose (NO BRAND SPECIFIED) test strip Use to test blood sugar 4 times daily or as directed. To accompany: Blood Glucose Monitor Brand per insurance. 150 strip 3     blood glucose monitoring (NO BRAND SPECIFIED) meter device kit Use to test blood sugar 4 times daily or as directed.: Blood Glucose Monitor Brand: per insurance. 1 kit 0     folic acid (FOLVITE) 1 MG tablet Take 1 tablet (1 mg) by mouth daily 90 tablet 3     meclizine (ANTIVERT) 25 MG tablet TAKE 1 TABLET(25 MG) BY MOUTH THREE TIMES DAILY AS NEEDED FOR DIZZINESS Strength: 25 mg 30 tablet 0     Prenatal Vit-Fe Sulfate-FA-DHA (PRENATAL VITAMIN/MIN +DHA PO)        sertraline (ZOLOFT) 50 MG tablet Take 1 tablet (50 mg) by mouth daily 30 tablet 3     thin (NO BRAND SPECIFIED) lancets Use to test blood sugar 4 times daily or as directed. To accompany: Blood Glucose Monitor Brands: per insurance. (Patient not taking: Reported on 1/5/2023) 100 each 3     No facility-administered medications prior to visit.       ROS:   CONSTITUTIONAL: Denies fever, chills, fatigue, or weight fluctuations.   HEENT: Denies headache, vision changes, and changes in speech.   CV: Refer to HPI.   PULMONARY:Denies shortness of breath, cough, or previous TB exposure.   GI:Denies nausea, vomiting, diarrhea, and abdominal pain. Bowel movements are regular.   :Denies urinary alterations, dysuria, urinary frequency, hematuria, and abnormal drainage.   EXT:Denies lower extremity edema.   SKIN:Denies abnormal rashes or lesions.   MUSCULOSKELETAL:Denies upper or lower extremity weakness and pain.   NEUROLOGIC:Denies lightheadedness, dizziness, seizures, or upper or lower  "extremity paresthesia.     EXAM:  /80   Pulse 72   Ht 1.6 m (5' 3\")   Wt 96.2 kg (212 lb)   LMP 06/01/2022 (Approximate)   SpO2 97%   BMI 37.55 kg/m    GENERAL: Appears alert and oriented times three.   HEENT: Eye symmetrical and free of discharge bilaterally. Mucous membranes moist and without lesions.  NECK: Supple and without lymphadenopathy. JVD below clavicular line   CV: RRR, S1S2 present without murmur, rub, or gallop.   RESPIRATORY: Respirations regular, even, and unlabored. Lungs CTA throughout.   GI: Soft and gravid with normoactive bowel sounds present in all quadrants. No tenderness, rebound, guarding. No organomegaly.   EXTREMITIES: Trace bilateral LE peripheral edema. 2+ bilateral pedal pulses.   NEUROLOGIC: Alert and orientated x 3. CN II-XII grossly intact. No focal deficits.   MUSCULOSKELETAL: No joint swelling or tenderness.   SKIN: No jaundice. No rashes or lesions.     The following Labs were reviewed today:  CBC RESULTS:  Lab Results   Component Value Date    WBC 10.4 01/26/2023    WBC 10.9 09/02/2005    RBC 4.10 01/26/2023    RBC 5.11 09/02/2005    HGB 11.7 01/26/2023    HGB 13.9 09/02/2005    HCT 35.7 01/26/2023    HCT 41.4 09/02/2005    MCV 87 01/26/2023    MCV 81 09/02/2005    MCH 28.5 01/26/2023    MCH 27.2 09/02/2005    MCHC 32.8 01/26/2023    MCHC 33.6 09/02/2005    RDW 17.6 (H) 01/26/2023    RDW 14.4 09/02/2005     (L) 01/26/2023     09/02/2005       CMP RESULTS:  Lab Results   Component Value Date     06/07/2022     (H) 09/02/2005    POTASSIUM 4.1 06/07/2022    POTASSIUM 3.9 09/02/2005    CHLORIDE 105 06/07/2022    CHLORIDE 106 09/02/2005    CO2 26 06/07/2022    CO2 30 09/02/2005    ANIONGAP 7 06/07/2022    ANIONGAP 10 09/02/2005     (H) 06/07/2022     09/02/2005    BUN 11 06/07/2022    BUN 10 09/02/2005    CR 0.47 (L) 01/26/2023    CR 0.70 09/02/2005    GFRESTIMATED >90 01/26/2023    GFRESTIMATED >60 02/12/2021    GFRESTIMATED GFR not " calculated, patient <16 years old. 2005    GFRESTBLACK >60 2021    GFRESTBLACK GFR not calculated, patient <16 years old. 2005    LEXIE 9.2 2022    LEXIE 9.5 2005    BILITOTAL 0.5 2022    BILITOTAL 0.4 2005    ALBUMIN 3.9 2022    ALBUMIN 4.7 (H) 2005    ALKPHOS 88 2022    ALKPHOS 229 2005    ALT 10 2023    ALT 20 2005    AST 17 2023    AST 30 2005        INR RESULTS:  Lab Results   Component Value Date    INR 0.95 2021     The following diagnostics were reviewed today:   Echo 22 consistent with EF 55-6%, LVIDd 4.4 cm, normal RV function, normal IVC, and no valvular abnormalities.     Zio 22:       Assessment and Plan:   Ms. Swartz is a 27 year old female with a past medical history including gestational DM (diet controlled), history of preeclampsia (emergency  at 39 weeks with second pregnancy), and Depression. She is currenlty , due date 3/2/23. She presents to Cardiology clinic for follow up.     SR with PAC's. Frequency per Zio with no acute concern. Prior TSH, CBC, and BMP. Hgb 11.7 .  - If symptoms persist or worsen she will notify us.   - Follow up in 3 months. She notes symptoms occurred prior to pregnancy onset. Would consider repeat Zio after pregnancy.     History of Preeclampsia.   - Management per OB/GYN.   - Encouraged daily BP monitoring at home. Notify OB/GYN if elevated > 130/80.     Gestational DM.   - management per OB/GYN.     Follow up in Cardiology clinic as needed if symptoms persist or worsen and in 3 months with Dr. Moss or myself.       NIRAJ Bhakta CNP  2023          CC    Thank you for allowing me to participate in the care of your patient.      Sincerely,     NIRAJ Bhakta CNP     [facility]  cc: No referring provider defined for this encounter.

## 2023-02-06 NOTE — PROGRESS NOTES
HPI:   Ms. Swartz is a 27 year old female with a past medical history including gestational DM (diet controlled), history of preeclampsia (emergency  at 39 weeks with second pregnancy), and Depression. She is currenlty , due date 3/2/23. She was evaluated by Dr. Moss 22 due to concerns for dizziness and weakness in setting of palpitations. Zio patch was consistent with occasional PAC's, fastest  and lowest 54 bpm. Echo negative for acute concerns. She was diagnosed with gestational DM Type II, currently diet controlled. She presents to Cardiology clinic for routine follow up.     She complains of palpitations about 1-2 times per week, lasting about 1-2 minutes. She denies dizziness or GARSIA associated with this. She notes LE edema and is currently on ASA. She has been doing weekly US with urine as well. 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 She denies fever, chills, lightheadedness, dizziness, chest pain, palpitations, SOB, GARSIA, PND, and orthopnea.     PAST MEDICAL HISTORY:  Past Medical History:   Diagnosis Date     Asthma      Depressive disorder N/A    Sometime in highschool     Diabetes (H)     [facility]     Mental disorder      Papanicolaou smear of cervix with atypical squamous cells of undetermined significance (ASC-US) 2016     Postpartum depression        FAMILY HISTORY:  Family History   Problem Relation Age of Onset     Hypertension Mother      GERD Mother      Congenital Anomalies Mother         Spinal Bifada     Anxiety Disorder Mother      Diabetes Type 2  Mother      Depression Father      Asthma Maternal Grandfather      Diabetes Type 2  Paternal Grandmother        SOCIAL HISTORY:  Social History     Socioeconomic History     Marital status:      Number of children: 2   Tobacco Use     Smoking status: Former     Packs/day: 0.50     Years: 1.00     Pack years: 0.50     Types: Cigarettes     Start  date: 2013     Quit date: 2020     Years since quittin.4     Smokeless tobacco: Former     Tobacco comments:     Smoked for 5years, quit cold turkey for both pregnancies, picked it up again upon returning to work.   Vaping Use     Vaping Use: Never used   Substance and Sexual Activity     Alcohol use: Not Currently     Comment: none     Drug use: No     Sexual activity: Yes     Partners: Male     Birth control/protection: None   Other Topics Concern     Parent/sibling w/ CABG, MI or angioplasty before 65F 55M? No     Social Determinants of Health     Financial Resource Strain: High Risk     Difficulty of Paying Living Expenses: Very hard   Food Insecurity: Food Insecurity Present     Worried About Running Out of Food in the Last Year: Sometimes true     Ran Out of Food in the Last Year: Sometimes true   Transportation Needs: Unmet Transportation Needs     Lack of Transportation (Medical): Yes     Lack of Transportation (Non-Medical): Yes   Physical Activity: Sufficiently Active     Days of Exercise per Week: 7 days     Minutes of Exercise per Session: 30 min   Stress: Stress Concern Present     Feeling of Stress : To some extent   Social Connections: Moderately Isolated     Frequency of Communication with Friends and Family: More than three times a week     Frequency of Social Gatherings with Friends and Family: Never     Attends Advent Services: Never     Active Member of Clubs or Organizations: No     Marital Status:    Housing Stability: High Risk     Unable to Pay for Housing in the Last Year: Yes     Number of Places Lived in the Last Year: 1     Unstable Housing in the Last Year: No       CURRENT MEDICATIONS:  Outpatient Medications Prior to Visit   Medication Sig Dispense Refill     acetone urine (KETOSTIX) test strip Test first urine of every morning for 1 week. If consistently negative, test once weekly. 50 strip 3     ASPIRIN LOW DOSE 81 MG EC tablet daily       blood glucose (NO  "BRAND SPECIFIED) lancets standard Test blood sugar 4 x/ day. Brand per insurance coverage. 200 each 1     blood glucose (NO BRAND SPECIFIED) test strip Use to test blood sugar 4 times daily or as directed. To accompany: Blood Glucose Monitor Brand per insurance. 150 strip 3     blood glucose monitoring (NO BRAND SPECIFIED) meter device kit Use to test blood sugar 4 times daily or as directed.: Blood Glucose Monitor Brand: per insurance. 1 kit 0     folic acid (FOLVITE) 1 MG tablet Take 1 tablet (1 mg) by mouth daily 90 tablet 3     meclizine (ANTIVERT) 25 MG tablet TAKE 1 TABLET(25 MG) BY MOUTH THREE TIMES DAILY AS NEEDED FOR DIZZINESS Strength: 25 mg 30 tablet 0     Prenatal Vit-Fe Sulfate-FA-DHA (PRENATAL VITAMIN/MIN +DHA PO)        sertraline (ZOLOFT) 50 MG tablet Take 1 tablet (50 mg) by mouth daily 30 tablet 3     thin (NO BRAND SPECIFIED) lancets Use to test blood sugar 4 times daily or as directed. To accompany: Blood Glucose Monitor Brands: per insurance. (Patient not taking: Reported on 1/5/2023) 100 each 3     No facility-administered medications prior to visit.       ROS:   CONSTITUTIONAL: Denies fever, chills, fatigue, or weight fluctuations.   HEENT: Denies headache, vision changes, and changes in speech.   CV: Refer to HPI.   PULMONARY:Denies shortness of breath, cough, or previous TB exposure.   GI:Denies nausea, vomiting, diarrhea, and abdominal pain. Bowel movements are regular.   :Denies urinary alterations, dysuria, urinary frequency, hematuria, and abnormal drainage.   EXT:Denies lower extremity edema.   SKIN:Denies abnormal rashes or lesions.   MUSCULOSKELETAL:Denies upper or lower extremity weakness and pain.   NEUROLOGIC:Denies lightheadedness, dizziness, seizures, or upper or lower extremity paresthesia.     EXAM:  /80   Pulse 72   Ht 1.6 m (5' 3\")   Wt 96.2 kg (212 lb)   LMP 06/01/2022 (Approximate)   SpO2 97%   BMI 37.55 kg/m    GENERAL: Appears alert and oriented times three. "   HEENT: Eye symmetrical and free of discharge bilaterally. Mucous membranes moist and without lesions.  NECK: Supple and without lymphadenopathy. JVD below clavicular line   CV: RRR, S1S2 present without murmur, rub, or gallop.   RESPIRATORY: Respirations regular, even, and unlabored. Lungs CTA throughout.   GI: Soft and gravid with normoactive bowel sounds present in all quadrants. No tenderness, rebound, guarding. No organomegaly.   EXTREMITIES: Trace bilateral LE peripheral edema. 2+ bilateral pedal pulses.   NEUROLOGIC: Alert and orientated x 3. CN II-XII grossly intact. No focal deficits.   MUSCULOSKELETAL: No joint swelling or tenderness.   SKIN: No jaundice. No rashes or lesions.     The following Labs were reviewed today:  CBC RESULTS:  Lab Results   Component Value Date    WBC 10.4 01/26/2023    WBC 10.9 09/02/2005    RBC 4.10 01/26/2023    RBC 5.11 09/02/2005    HGB 11.7 01/26/2023    HGB 13.9 09/02/2005    HCT 35.7 01/26/2023    HCT 41.4 09/02/2005    MCV 87 01/26/2023    MCV 81 09/02/2005    MCH 28.5 01/26/2023    MCH 27.2 09/02/2005    MCHC 32.8 01/26/2023    MCHC 33.6 09/02/2005    RDW 17.6 (H) 01/26/2023    RDW 14.4 09/02/2005     (L) 01/26/2023     09/02/2005       CMP RESULTS:  Lab Results   Component Value Date     06/07/2022     (H) 09/02/2005    POTASSIUM 4.1 06/07/2022    POTASSIUM 3.9 09/02/2005    CHLORIDE 105 06/07/2022    CHLORIDE 106 09/02/2005    CO2 26 06/07/2022    CO2 30 09/02/2005    ANIONGAP 7 06/07/2022    ANIONGAP 10 09/02/2005     (H) 06/07/2022     09/02/2005    BUN 11 06/07/2022    BUN 10 09/02/2005    CR 0.47 (L) 01/26/2023    CR 0.70 09/02/2005    GFRESTIMATED >90 01/26/2023    GFRESTIMATED >60 02/12/2021    GFRESTIMATED GFR not calculated, patient <16 years old. 09/02/2005    GFRESTBLACK >60 02/12/2021    GFRESTBLACK GFR not calculated, patient <16 years old. 09/02/2005    LEXIE 9.2 06/07/2022    LEXIE 9.5 09/02/2005    BILITOTAL 0.5  2022    BILITOTAL 0.4 2005    ALBUMIN 3.9 2022    ALBUMIN 4.7 (H) 2005    ALKPHOS 88 2022    ALKPHOS 229 2005    ALT 10 2023    ALT 20 2005    AST 17 2023    AST 30 2005        INR RESULTS:  Lab Results   Component Value Date    INR 0.95 2021     The following diagnostics were reviewed today:   Echo 22 consistent with EF 55-6%, LVIDd 4.4 cm, normal RV function, normal IVC, and no valvular abnormalities.     Zio 22:       Assessment and Plan:   Ms. Swartz is a 27 year old female with a past medical history including gestational DM (diet controlled), history of preeclampsia (emergency  at 39 weeks with second pregnancy), and Depression. She is currenlty , due date 3/2/23. She presents to Cardiology clinic for follow up.     SR with PAC's. Frequency per Zio with no acute concern. Prior TSH, CBC, and BMP. Hgb 11.7 .  - If symptoms persist or worsen she will notify us.   - Follow up in 3 months. She notes symptoms occurred prior to pregnancy onset. Would consider repeat Zio after pregnancy.     History of Preeclampsia.   - Management per OB/GYN.   - Encouraged daily BP monitoring at home. Notify OB/GYN if elevated > 130/80.     Gestational DM.   - management per OB/GYN.     Follow up in Cardiology clinic as needed if symptoms persist or worsen and in 3 months with Dr. Moss or myself.       Laya Gallo, APRN CNP  2023          CC

## 2023-02-07 ENCOUNTER — OFFICE VISIT (OUTPATIENT)
Dept: MATERNAL FETAL MEDICINE | Facility: CLINIC | Age: 28
End: 2023-02-07
Attending: OBSTETRICS & GYNECOLOGY

## 2023-02-07 ENCOUNTER — HOSPITAL ENCOUNTER (OUTPATIENT)
Dept: ULTRASOUND IMAGING | Facility: CLINIC | Age: 28
Discharge: HOME | End: 2023-02-07
Attending: OBSTETRICS & GYNECOLOGY
Payer: COMMERCIAL

## 2023-02-07 ENCOUNTER — OFFICE VISIT (OUTPATIENT)
Dept: ORTHOPEDICS | Facility: CLINIC | Age: 28
End: 2023-02-07
Payer: COMMERCIAL

## 2023-02-07 VITALS
DIASTOLIC BLOOD PRESSURE: 89 MMHG | HEIGHT: 63 IN | BODY MASS INDEX: 37.56 KG/M2 | WEIGHT: 212 LBS | SYSTOLIC BLOOD PRESSURE: 141 MMHG

## 2023-02-07 DIAGNOSIS — M76.51 PATELLAR TENDINITIS OF RIGHT KNEE: ICD-10-CM

## 2023-02-07 DIAGNOSIS — O35.8XX0 UMBILICAL VEIN ABNORMALITY AFFECTING PREGNANCY, SINGLE OR UNSPECIFIED FETUS: Primary | ICD-10-CM

## 2023-02-07 DIAGNOSIS — M25.561 ACUTE PAIN OF RIGHT KNEE: Primary | ICD-10-CM

## 2023-02-07 DIAGNOSIS — O35.8XX0 UMBILICAL VEIN ABNORMALITY AFFECTING PREGNANCY, SINGLE OR UNSPECIFIED FETUS: ICD-10-CM

## 2023-02-07 PROCEDURE — 99214 OFFICE O/P EST MOD 30 MIN: CPT | Performed by: FAMILY MEDICINE

## 2023-02-07 PROCEDURE — 76816 OB US FOLLOW-UP PER FETUS: CPT | Mod: 26 | Performed by: OBSTETRICS & GYNECOLOGY

## 2023-02-07 PROCEDURE — 76819 FETAL BIOPHYS PROFIL W/O NST: CPT | Mod: 26 | Performed by: OBSTETRICS & GYNECOLOGY

## 2023-02-07 PROCEDURE — 76816 OB US FOLLOW-UP PER FETUS: CPT
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
## 2023-02-07 NOTE — PROGRESS NOTES
The patient was seen for an ultrasound in the Maternal[facility]Fetal Medicine Center at the [facility] today.  For a detailed report of the ultrasound examination, please see the ultrasound report which can be found under the imaging tab.    Jenny Rae MD  , OB/GYN  Maternal-Fetal Medicine  [phone] (Pager)

## 2023-02-07 NOTE — TELEPHONE ENCOUNTER
Gestational Diabetes Follow-up    Subjective/Objective:    Deborah STEPHANIA Swartz sent in blood glucose log for review. Last date of communication was: .    Gestational diabetes is being managed with diet, activity    Taking diabetes medications:     Estimated Date of Delivery: Mar 8, 2023    BG/Food Log:         Assessment:    Ketones: trace.   Fasting blood glucoses: 92% in target.  After breakfast: 100% in target.  After lunch: 100% in target.  After dinner: 100% in target.    Plan/Response:  No changes in the patient's current treatment plan.  Follow-up in 2 weeks.    [facility]  Triage: [phone]  [phone]    Any diabetes medication dose changes were made via the CDE Protocol and Collaborative Practice Agreement with the patient's referring provider. A copy of this encounter was shared with the provider.

## 2023-02-07 NOTE — NURSING NOTE
Patient presents to [facility] for RL2/BPP for UVV and GDM A1 at 35+6 weeks. Positive fetal movement. Denies LOF, vaginal bleeding or cramping/contractions. BG levels are largely within normal limits. SBAR given to M MD, see their note in Epic.

## 2023-02-09 ENCOUNTER — PATIENT OUTREACH (OUTPATIENT)
Dept: CARE COORDINATION | Facility: CLINIC | Age: 28
End: 2023-02-09

## 2023-02-09 NOTE — PROGRESS NOTES
Clinic Care Coordination Contact  Program: [facility]   County:  [facility] Case #:  [facility] Worker:   Jason #:   Subscriber #:   Renewal:  Date Applied:     FRW Outreach:   2/9/23: FRW called pt. Pt sent in que care application. FRW and pt will connect in 2 weeks.  1/11/23: FRW called pt. Pt's SNAP is approved. FRW will get the rest of verifications in and FRW and pt will connect in 3-4 weeks.   12/21/22: FRW called pt and emailed the applications for pt to complete. FRW will reach out to pt in 3 weeks.     Health Insurance:      Referral/Screening:  Financial Resource Worker Screening     [facility] Benefits  Is patient requesting help applying for county benefits?: Yes (SNAP and Cash Assistance)  Have you recently applied for any [facility] benefits?: Yes  What was applied for? : SNAP  Application date:: September 2022- Denied  How many people in your household?: 4  Do you buy/eat food together?: Yes  What is the monthly gross income for the household (wages, social security, workers comp, and pension)? : 0 (Pt's  recently lost his job- pt is on a leave with no benefits or income.)     Insurance:  Was [place]?: Yes  Is this an insurance renewal?: No  Is this a new insurance application request?:  (unsure)     Any other information for the FRW?: Patient has outstanding [facility] bills.     GIANCARLO Savage  Clinic Care Coordination  [facility]: [facility] [place], and [place] [facility]  Phone: [phone]

## 2023-02-11 ENCOUNTER — HOSPITAL ENCOUNTER (OUTPATIENT)
Facility: CLINIC | Age: 28
Discharge: HOME | End: 2023-02-11
Attending: OBSTETRICS & GYNECOLOGY | Admitting: OBSTETRICS & GYNECOLOGY

## 2023-02-11 VITALS
TEMPERATURE: 97.3 F | HEIGHT: 63 IN | BODY MASS INDEX: 35.44 KG/M2 | SYSTOLIC BLOOD PRESSURE: 123 MMHG | OXYGEN SATURATION: 99 % | WEIGHT: 200 LBS | RESPIRATION RATE: 18 BRPM | DIASTOLIC BLOOD PRESSURE: 77 MMHG | HEART RATE: 63 BPM

## 2023-02-11 VITALS — RESPIRATION RATE: 16 BRPM | SYSTOLIC BLOOD PRESSURE: 124 MMHG | DIASTOLIC BLOOD PRESSURE: 78 MMHG | TEMPERATURE: 97.9 F

## 2023-02-11 PROCEDURE — 59025 FETAL NON-STRESS TEST: CPT

## 2023-02-11 PROCEDURE — G0463 HOSPITAL OUTPT CLINIC VISIT: HCPCS | Mod: 25

## 2023-02-11 PROCEDURE — 99284 EMERGENCY DEPT VISIT MOD MDM: CPT | Mod: 25

## 2023-02-11 RX ORDER — METOCLOPRAMIDE HYDROCHLORIDE 5 MG/ML
10 INJECTION INTRAMUSCULAR; INTRAVENOUS EVERY 6 HOURS PRN
Status: DISCONTINUED | OUTPATIENT
Start: 2023-02-11 | End: 2023-02-11 | Stop reason: HOSPADM

## 2023-02-11 RX ORDER — ONDANSETRON 2 MG/ML
4 INJECTION INTRAMUSCULAR; INTRAVENOUS EVERY 6 HOURS PRN
Status: DISCONTINUED | OUTPATIENT
Start: 2023-02-11 | End: 2023-02-11 | Stop reason: HOSPADM

## 2023-02-11 RX ORDER — PROCHLORPERAZINE MALEATE 5 MG
10 TABLET ORAL EVERY 6 HOURS PRN
Status: DISCONTINUED | OUTPATIENT
Start: 2023-02-11 | End: 2023-02-11 | Stop reason: HOSPADM

## 2023-02-11 RX ORDER — METOCLOPRAMIDE 10 MG/1
10 TABLET ORAL EVERY 6 HOURS PRN
Status: DISCONTINUED | OUTPATIENT
Start: 2023-02-11 | End: 2023-02-11 | Stop reason: HOSPADM

## 2023-02-11 RX ORDER — ONDANSETRON 4 MG/1
4 TABLET, ORALLY DISINTEGRATING ORAL EVERY 6 HOURS PRN
Status: DISCONTINUED | OUTPATIENT
Start: 2023-02-11 | End: 2023-02-11 | Stop reason: HOSPADM

## 2023-02-11 RX ORDER — PROCHLORPERAZINE 25 MG
25 SUPPOSITORY, RECTAL RECTAL EVERY 12 HOURS PRN
Status: DISCONTINUED | OUTPATIENT
Start: 2023-02-11 | End: 2023-02-11 | Stop reason: HOSPADM

## 2023-02-12 ENCOUNTER — TELEPHONE (OUTPATIENT)
Dept: OBGYN | Facility: CLINIC | Age: 28
End: 2023-02-12

## 2023-02-12 ENCOUNTER — APPOINTMENT (OUTPATIENT)
Dept: CT IMAGING | Facility: CLINIC | Age: 28
End: 2023-02-12
Attending: EMERGENCY MEDICINE

## 2023-02-12 ENCOUNTER — HOSPITAL ENCOUNTER (EMERGENCY)
Facility: CLINIC | Age: 28
Discharge: HOME | End: 2023-02-12
Attending: EMERGENCY MEDICINE | Admitting: EMERGENCY MEDICINE

## 2023-02-12 DIAGNOSIS — S16.1XXA STRAIN OF NECK MUSCLE, INITIAL ENCOUNTER: ICD-10-CM

## 2023-02-12 DIAGNOSIS — S09.90XA CLOSED HEAD INJURY, INITIAL ENCOUNTER: ICD-10-CM

## 2023-02-12 PROCEDURE — 72125 CT NECK SPINE W/O DYE: CPT

## 2023-02-12 PROCEDURE — 70450 CT HEAD/BRAIN W/O DYE: CPT

## 2023-02-12 PROCEDURE — 250N000013 HC RX MED GY IP 250 OP 250 PS 637: Performed by: EMERGENCY MEDICINE

## 2023-02-12 RX ORDER — ACETAMINOPHEN 500 MG
1000 TABLET ORAL ONCE
Status: COMPLETED | OUTPATIENT
Start: 2023-02-12 | End: 2023-02-12

## 2023-02-12 RX ADMIN — ACETAMINOPHEN 1000 MG: 500 TABLET ORAL at 00:53

## 2023-02-12 ASSESSMENT — ENCOUNTER SYMPTOMS
ABDOMINAL PAIN: 0
DIZZINESS: 1
BACK PAIN: 1
NECK PAIN: 1
HEADACHES: 1

## 2023-02-12 ASSESSMENT — ACTIVITIES OF DAILY LIVING (ADL): ADLS_ACUITY_SCORE: 35

## 2023-02-12 NOTE — DISCHARGE INSTRUCTIONS
Discharge Instructions  Head Injury    You have been seen today for a head injury. You were checked for serious problems, like bleeding on the brain, but these problems cannot always be found right away.  Due to this risk, you should not be alone for 24 hours after your injury.  Follow up with your regular physician in 5-7 days. If you are taking a blood thinner, such as aspirin, Pradaxa  (dabigatran), Coumadin  (warfarin), or Plavix  (clopidogrel), you are at especially high risk for immediate or delayed bleeding, and need to re-check with a physician in 24 hours, or sooner if any of the symptoms below happen.     Return to the Emergency Department if:  You are confused, have amnesia, or you are not acting right.  Your headache gets worse or you start to have a really bad headache even with your recommended treatment plan.  You vomit more than once.  You have a convulsion or seizure.  You have trouble walking.  You have weakness or paralysis in an arm or a leg.  You have blood or fluid coming from your ears or nose.  You have new symptoms or anything that worries you.    Sleeping:  It is okay for you to sleep, but someone should wake you up as instructed by your doctor, and someone should check on you at your usual time to wake up.     Activity:  Do not drive for at least 24 hours.  Do not drive if you have dizzy spells or trouble concentrating, or remembering things.  Do not return to any contact sports until cleared by your regular doctor.     Follow-up:  It is very important that you make an appointment with your clinic and go to the appointment.  If you do not follow-up with your regular doctor, it may result in missing an important development which could result in permanent injury or disability and/or lasting pain.  If there is any problem keeping your appointment, call your doctor or return to the Emergency Department.    MORE INFORMATION:    Concussion:  A concussion is a minor head injury that may cause  temporary problems with the way your brain works.  Some symptoms include:  confusion, amnesia, nausea and vomiting, dizziness, fatigue, memory or concentration problems, irritability and sleep problems.    CT Scans: Your evaluation today may have included a CT scan (CAT scan) to look for things like bleeding or a skull fracture (break).  CT scans involve radiation and too many CT scans can cause serious health problems like cancer, especially in children.  Because of this, your doctor may not have ordered a CT scan today if they think you are at low risk for a serious or life threatening problem.    If you were given a prescription for medicine here today, be sure to read all of the information (including the package insert) that comes with your prescription.  This will include important information about the medicine, its side effects, and any warnings that you need to know about.  The pharmacist who fills the prescription can provide more information and answer questions you may have about the medicine.  If you have questions or concerns that the pharmacist cannot address, please call or return to the Emergency Department.     Opioid Medication Information    Pain medications are among the most commonly prescribed medicines, so we are including this information for all our patients. If you did not receive pain medication or get a prescription for pain medicine, you can ignore it.     You may have been given a prescription for an opioid (narcotic) pain medicine and/or have received a pain medicine while here in the Emergency Department. These medicines can make you drowsy or impaired. You must not drive, operate dangerous equipment, or engage in any other dangerous activities while taking these medications. If you drive while taking these medications, you could be arrested for DUI, or driving under the influence. Do not drink any alcohol while you are taking these medications.     Opioid pain medications can cause  addiction. If you have a history of chemical dependency of any type, you are at a higher risk of becoming addicted to pain medications.  Only take these prescribed medications to treat your pain when all other options have been tried. Take it for as short a time and as few doses as possible. Store your pain pills in a secure place, as they are frequently stolen and provide a dangerous opportunity for children or visitors in your house to start abusing these powerful medications. We will not replace any lost or stolen medicine.  As soon as your pain is better, you should flush all your remaining medication.     Many prescription pain medications contain Tylenol  (acetaminophen), including Vicodin , Tylenol #3 , Norco , Lortab , and Percocet .  You should not take any extra pills of Tylenol  if you are using these prescription medications or you can get very sick.  Do not ever take more than 3000 mg of acetaminophen in any 24 hour period.    All opioids tend to cause constipation. Drink plenty of water and eat foods that have a lot of fiber, such as fruits, vegetables, prune juice, apple juice and high fiber cereal.  Take a laxative if you don t move your bowels at least every other day. Miralax , Milk of Magnesia, Colace , or Senna  can be used to keep you regular.      Remember that you can always come back to the Emergency Department if you are not able to see your regular doctor in the amount of time listed above, if you get any new symptoms, or if there is anything that worries you.

## 2023-02-12 NOTE — DISCHARGE INSTRUCTIONS
Discharge Instruction for Undelivered Patients      You were seen for: Fall  We Consulted: Dr Mejias  You had (Test or Medicine):monitoring of uterus and baby.     Diet:   You may eat meals and snacks.     Activity:  Call your doctor or nurse midwife if your baby is moving less than usual.     Call your provider if you notice:  Swelling in your face or increased swelling in your hands or legs.  Headaches that are not relieved by Tylenol (acetaminophen).  Changes in your vision (blurring: seeing spots or stars.)  Nausea (sick to your stomach) and vomiting (throwing up).   Weight gain of 5 pounds or more per week.  Heartburn that doesn't go away.  Signs of bladder infection: pain when you urinate (use the toilet), need to go more often and more urgently.  The bag of willson (rupture of membranes) breaks, or you notice leaking in your underwear.  Bright red blood in your underwear.  Abdominal (lower belly) or stomach pain.  Second (plus) baby: Contractions (tightening) less than 10 minutes apart and getting stronger.  Increase or change in vaginal discharge (note the color and amount)      Follow-up:  As scheduled in the clinic

## 2023-02-12 NOTE — TELEPHONE ENCOUNTER
NST reviewed this AM after pt presented to ER for fall on to head last night.   NST appears to have FHR baseline of 130bpm with large prolonged accelerations, however due to this not being a clear NST, would recommend repeat NST today to r/o possibility of higher baseline with prolonged decelerations.     Patient was called, no VM is set up. Will send my chart message to patient to come in to MAC. Will route to triage RN for follow-up Monday if pt does not come in today.     Mary Giles MD, MHS  02/12/23

## 2023-02-12 NOTE — ED TRIAGE NOTES
Patient states slipped, fell.  Hit back of head on ice.  Happened at noon.  Initially after had a headache which passed.  Then came back at 8pm with dizziness & light sensitivity.  No nausea or vomiting.   36 weeks pregnant.  Complaints of left rib pain from fall.

## 2023-02-12 NOTE — PLAN OF CARE
" 36-3 week patient admitted to [facility], ambulatory per services of Dr Mejias for evaluation of fall on ice at noon, light sensitivity, dizziness.  Pt states she was carrying her two year old daughter outside when she slipped on the ice. Patient states it was a \"quick fall.\" Patient fell straight back and hit the back of her head, and her right elbow. She said she did not hit any part of her abdomen. She is complaining of 7/10 pain in her left side ribs and neck. Her right elbow has a bruise, pt has a bump on back of her head. Patient denies leaking of fluid or vaginal bleed, patient reports she has been feeling baby move. She is not feeling any contractions. Patient does not believe she loss consciousness, her  was there with her. She has not thrown up. Patient states \"I know I have a concussion.\" Patient is on aspirin 81 mg for a history of pre eclampsia.   Discussed plan of care including EFM with NST, routine VS.  Pt agreeable.  EFM applied and admission assessment completed.      Patient states she would like to go to ER after finishing in [facility].   "

## 2023-02-12 NOTE — ED PROVIDER NOTES
History     Chief Complaint:  Head Injury       HPI   Deborah Swartz is a 27 year old female, , with a history of diabetes who presents with a head injury. The patient reports that she fell and struck the back of her head on the ice at noon, leaving a visible bump toward the top and back of her head. She explains that her neck and back also feel pain, especially when she moves her neck from side to side. She also endorses some dizziness, cuts and bruises on her right arm, and light sensitivity. No abdominal pain. Deborah states that her last concussion was in . Of note, the patient is 36 weeks pregnant and got the baby checked before presenting to the ED, with everything appearing normal.       Independent Historian: Patient    Review of External Notes: I reviewed the patient's chart and Care Everywhere     ROS:  Review of Systems   Gastrointestinal: Negative for abdominal pain.   Musculoskeletal: Positive for back pain and neck pain.   Skin:        (+) Cuts and bruises on right arm   Neurological: Positive for dizziness and headaches.        (+) Sensitivity to light   All other systems reviewed and are negative.      Allergies:  Fluoxetine  Latex     Medications:    Antivert  Zoloft  Aspirin 81 mg    Past Medical History:    Diabetes  Depression  Asthma  Tobacco use disorder    Past Surgical History:     section    Family History:    Mother: Anxiety, Congenital Anomalies, Type 2 diabetes, GERD, HTN  Father: Depression    Social History:  Patient reports that she quit smoking about 2 years ago. Her smoking use included cigarettes. She started smoking about 10 years ago. She has a 0.50 pack-year smoking history. She has quit using smokeless tobacco. She reports that she does not currently use alcohol. She reports that she does not use drugs.  Patient arrives by private vehicle alone  PCP: Katie Perez     Physical Exam     Patient Vitals for the past 24 hrs:   BP Temp Temp src Pulse Resp SpO2 Height  "Weight   02/11/23 2342 123/77 97.3  F (36.3  C) Temporal 63 18 99 % 1.6 m (5' 3\") 90.7 kg (200 lb)        Physical Exam  GENERAL: well developed, pleasant  HEAD: atraumatic  EYES: pupils reactive, extraocular muscles intact, conjunctivae normal  ENT:  mucus membranes moist  NECK:  trachea midline. Mild loss of range of motion and neck stiffness  RESPIRATORY: no tachypnea, breath sounds clear to auscultation   CVS: normal S1/S2, no murmurs, intact distal pulses  ABDOMEN: soft, nontender, nondistention  MUSCULOSKELETAL: no deformities. Able to rotate and supinate right arm  SKIN: warm and dry, no acute rashes or ulceration  NEURO: GCS 15, cranial nerves intact, alert and oriented x3  PSYCH:  Mood/affect normal      Emergency Department Course   Imaging:  CT Cervical Spine w/o Contrast   Final Result   IMPRESSION:   HEAD CT:   1.  No acute intracranial abnormality or significant change compared to the prior study.      CERVICAL SPINE CT:   1.  No CT evidence for acute fracture or post traumatic subluxation.      CT Head w/o Contrast   Final Result   IMPRESSION:   HEAD CT:   1.  No acute intracranial abnormality or significant change compared to the prior study.      CERVICAL SPINE CT:   1.  No CT evidence for acute fracture or post traumatic subluxation.         Report per radiology      Emergency Department Course & Assessments:      Interventions/Assessments:  0035 I obtained history and examined the patient as noted above  Medications   acetaminophen (TYLENOL) tablet 1,000 mg (1,000 mg Oral Given 2/12/23 0053)        Independent Interpretation (X-rays, CTs, rhythm strip):  I independently interpreted the patient's imaging in real time     Consultations/Discussion of Management or Tests:  na     Social Determinants of Health affecting care:   36 weeks pregnant    Disposition:  The patient was discharged to home.     Impression & Plan    Medical Decision Making:    Patient presents with closed head injury and neck " pain after falling backwards onto her head and neck.  She has minimal range of motion of her neck without significant pain and some dizziness.  She notes that her  stated that she had some word finding issues initially.  She has had a concussion in the past but not for some time.  She is pregnant went up to labor and delivery and baby is doing well from the falling standpoint.  Given the mild confusion and significant neck pain CT head and neck were obtained and is negative.  She was given Tylenol for pain.  Unfortunate with the pregnancy state standpoint muscle relaxants are contraindicated.  Continue with supportive care and Tylenol and concussion instructions.    Diagnosis:    ICD-10-CM    1. Closed head injury, initial encounter  S09.90XA       2. Strain of neck muscle, initial encounter  S16.1XXA            Discharge Medications:  Discharge Medication List as of 2/12/2023  2:11 AM             Scribe Disclosure:  Michoacano KHAN, am serving as a scribe at 1:04 AM on 2/12/2023 to document services personally performed by Rito Dallas MD based on my observations and the provider's statements to me.   2/12/2023   Rito Dallas MD Adams, Shaun L, MD  02/12/23 0532

## 2023-02-13 ENCOUNTER — ALLIED HEALTH/NURSE VISIT (OUTPATIENT)
Dept: NURSING | Facility: CLINIC | Age: 28
End: 2023-02-13

## 2023-02-13 DIAGNOSIS — O09.93 SUPERVISION OF HIGH RISK PREGNANCY IN THIRD TRIMESTER: Primary | ICD-10-CM

## 2023-02-13 PROCEDURE — 99207 PR NO CHARGE NURSE ONLY: CPT

## 2023-02-13 PROCEDURE — 59025 FETAL NON-STRESS TEST: CPT

## 2023-02-13 NOTE — PROGRESS NOTES
Patient here for NST per order from Dr Giles  External monitors placed after explanation and consent from patient about the procedure.  Denies contractions or cramping, LOF or VB.  Reports active FM    NST INTERPRETATION    Baseline Rate: 150  Variability: moderate  Accelerations: present and movement noted by patient  Decelerations: none  Reactivity confirmed after strip reviewed by Dr Quiñones and Dr Giles on her return to clinic    Pt discharged home without incident with instructions to call with any concerns or sx's of labor or DFM.  Pt verbalized understanding, in agreement with plan, and voiced no further questions.    Consuelo Mitchell RN on 2/13/2023 at 11:17 AM

## 2023-02-13 NOTE — TELEPHONE ENCOUNTER
Spoke with patient. She did not look at previous mychart.   Discussed importance of following up for NST. Patient will come in today at 10:30am for NST.    Alexia Barone RN

## 2023-02-16 ENCOUNTER — MYC MEDICAL ADVICE (OUTPATIENT)
Dept: EDUCATION SERVICES | Facility: CLINIC | Age: 28
End: 2023-02-16

## 2023-02-16 ENCOUNTER — OFFICE VISIT (OUTPATIENT)
Dept: MATERNAL FETAL MEDICINE | Facility: CLINIC | Age: 28
End: 2023-02-16
Attending: OBSTETRICS & GYNECOLOGY

## 2023-02-16 ENCOUNTER — HOSPITAL ENCOUNTER (OUTPATIENT)
Dept: ULTRASOUND IMAGING | Facility: CLINIC | Age: 28
Discharge: HOME | End: 2023-02-16
Attending: OBSTETRICS & GYNECOLOGY
Payer: COMMERCIAL

## 2023-02-16 DIAGNOSIS — O35.8XX0 UMBILICAL VEIN ABNORMALITY AFFECTING PREGNANCY, SINGLE OR UNSPECIFIED FETUS: ICD-10-CM

## 2023-02-16 DIAGNOSIS — O35.8XX0 UMBILICAL VEIN ABNORMALITY AFFECTING PREGNANCY, SINGLE OR UNSPECIFIED FETUS: Primary | ICD-10-CM

## 2023-02-16 PROCEDURE — 76819 FETAL BIOPHYS PROFIL W/O NST: CPT | Mod: 26 | Performed by: OBSTETRICS & GYNECOLOGY

## 2023-02-16 PROCEDURE — 76819 FETAL BIOPHYS PROFIL W/O NST: CPT

## 2023-02-16 NOTE — PROGRESS NOTES
The patient was seen for an ultrasound in the [facility] at the [facility] today.  For a detailed report of the ultrasound examination, please see the ultrasound report which can be found under the imaging tab.    Jenny Rae MD  , OB/GYN  Maternal-Fetal Medicine  [phone] (Pager)

## 2023-02-16 NOTE — TELEPHONE ENCOUNTER
Gestational Diabetes Follow-up    Subjective/Objective:    Deborah Swartz sent in blood glucose log for review. Last date of communication was: 2/6/23.    Gestational diabetes is being managed with diet and activity    Taking diabetes medications: no    Estimated Date of Delivery: Mar 8, 2023    BG/Food Log:         Assessment:  Blood sugars in target and anticipate readings will stay in target through delivery.  Unclear if she'd qualify for MA.  If she picks up test strips ok to test 2 times per day.    Ketones: trace.   Fasting blood glucoses: 100% in target.  After breakfast: 100% in target.  Before lunch: -% in target.  After lunch: 100% in target.  Before dinner: -% in target.  After dinner: 100% in target.    Plan/Response:  No changes in the patient's current treatment plan.  Follow up with questions.    Sherri Bassett MS, RD, LD, CDE      Any diabetes medication dose changes were made via the CDE Protocol and Collaborative Practice Agreement with the patient's OB/GYN provider. A copy of this encounter was shared with the provider.

## 2023-02-16 NOTE — NURSING NOTE
Patient presents to [facility] for BPP due to a UVV at 37+1 weeks. Positive fetal movement. Denies LOF, vaginal bleeding or cramping/contractions. SBAR given to ALYCE MD, see their note in Epic.

## 2023-02-17 ENCOUNTER — PRENATAL OFFICE VISIT (OUTPATIENT)
Dept: OBGYN | Facility: CLINIC | Age: 28
End: 2023-02-17
Payer: COMMERCIAL

## 2023-02-17 VITALS — SYSTOLIC BLOOD PRESSURE: 119 MMHG | BODY MASS INDEX: 37.66 KG/M2 | DIASTOLIC BLOOD PRESSURE: 78 MMHG | WEIGHT: 212.6 LBS

## 2023-02-17 DIAGNOSIS — O34.219 HISTORY OF CESAREAN SECTION COMPLICATING PREGNANCY: ICD-10-CM

## 2023-02-17 DIAGNOSIS — O24.410 DIET CONTROLLED GESTATIONAL DIABETES MELLITUS (GDM) IN THIRD TRIMESTER: ICD-10-CM

## 2023-02-17 DIAGNOSIS — O09.93 SUPERVISION OF HIGH RISK PREGNANCY IN THIRD TRIMESTER: Primary | ICD-10-CM

## 2023-02-17 DIAGNOSIS — O09.292 HISTORY OF PRE-ECLAMPSIA IN PRIOR PREGNANCY, CURRENTLY PREGNANT, SECOND TRIMESTER: ICD-10-CM

## 2023-02-17 DIAGNOSIS — O26.899 PREGNANCY COMPLICATED BY UMBILICAL CORD VARIX IN ANTEPARTUM PERIOD: ICD-10-CM

## 2023-02-17 PROBLEM — Z32.00 ENCOUNTER FOR PREGNANCY TEST, RESULT UNKNOWN: Status: RESOLVED | Noted: 2022-08-09 | Resolved: 2023-02-17

## 2023-02-17 PROBLEM — B37.31 VAGINAL YEAST INFECTION: Status: RESOLVED | Noted: 2022-08-09 | Resolved: 2023-02-17

## 2023-02-17 PROBLEM — F17.200 TOBACCO USE DISORDER: Status: RESOLVED | Noted: 2017-03-13 | Resolved: 2023-02-17

## 2023-02-17 PROBLEM — Z30.430 ENCOUNTER FOR IUD INSERTION: Status: RESOLVED | Noted: 2022-06-07 | Resolved: 2023-02-17

## 2023-02-17 PROBLEM — Z30.430 ENCOUNTER FOR INSERTION OF MIRENA IUD: Status: RESOLVED | Noted: 2022-08-09 | Resolved: 2023-02-17

## 2023-02-17 PROCEDURE — 87653 STREP B DNA AMP PROBE: CPT | Performed by: STUDENT IN AN ORGANIZED HEALTH CARE EDUCATION/TRAINING PROGRAM

## 2023-02-17 PROCEDURE — 99213 OFFICE O/P EST LOW 20 MIN: CPT | Performed by: STUDENT IN AN ORGANIZED HEALTH CARE EDUCATION/TRAINING PROGRAM

## 2023-02-17 NOTE — PATIENT INSTRUCTIONS
Please follow these instructions prior to your :     -Shower with special soap the night before  -No food 8 hours prior to  time   -Water only up until 4 hours prior to  time   -Drink 16oz of sugar free Gatorade 2 hours before  time- the best time to do this is on your way to the hospital.

## 2023-02-17 NOTE — PROGRESS NOTES
Prenatal Care (37w2d)    Deborah Swartz is a 27 year old  at 37w2d by LMP c/w 11w1d US presenting for routine prenatal care.  Denies LOF, VB, ctx. +FM. Seen in triage recently for fall onto head. Still has some residual pain.   GDMA1- BG in target, last checked 5 days ago. Does not have test strips     Conditions affecting pregnancy:  - Low lying placenta, circumvallate  - Hx CS x 2  - Hx preE w/o SF    - Migraine HA   - BMI 34  - MDD  - GDMA1    Objective- see flow sheet    Deborah Swartz is a 27 year old  at 37w2d presenting for routine ob visit      ICD-10-CM    1. Supervision of high risk pregnancy in third trimester  O09.93 Group B strep PCR     Primary Care - Care Coordination Referral      2. Diet controlled gestational diabetes mellitus (GDM) in third trimester  O24.410 Primary Care - Care Coordination Referral      3. 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 History of pre-eclampsia in prior pregnancy, currently pregnant, third trimester  O09.292       5. History of  section complicating pregnancy  O34.219            - GDMA- Ran out of test strips and does not have money to buy more. Recommend continuing to check BG. Referral to SW provided for resources and routed to RD.   - Umbilical vein varix- follows with MFM. Growth US at 32w: EFW 76%ile. Continue Weekly BPPs with MFM.   - Hx preE:    - ASA- rx provided   - Baseline preE labs wnl   - Hx of CS x 2: Repeat CS+BS 3/2, orders placed. ERAS info/packet given. Instructed to pick-up sugar free Gatorade. Prior op note reviewed, no note of adhesive disease  - Sterilization: certain about this decision. Federal tubal papers signed.   - Depression: on Zoloft, seeing psych. Dose increased.  - First tri labs wnl. Anatomy US with low lying placenta, circumvallate placenta. Level II with UVV. S/p influenza. Declines COVID vax. Third tri labs: failed GCT, GTT. GBS collected today.   - Discussed routine  precautions. Deborah has requested several letters for work restrictions, time off in pregnancy, disability. She is now requesting a letter to be off from  until delivery. No pregnancy related medical indication for being off at this time. Discussed that I can offer letter stating she had office visit today.   - TWlbs. Pregravid BMI 34. Expect 11-20 lbs.    Follow-up weekly for routine ob visit.    Mary Giles MD, MHS  23

## 2023-02-19 LAB — GP B STREP DNA SPEC QL NAA+PROBE: NEGATIVE

## 2023-02-20 ENCOUNTER — PATIENT OUTREACH (OUTPATIENT)
Dept: CARE COORDINATION | Facility: CLINIC | Age: 28
End: 2023-02-20

## 2023-02-20 NOTE — PROGRESS NOTES
Clinic Care Coordination Contact    Follow Up Progress Note      Assessment: SW spoke with pt who is due with her baby in less than 2 weeks.  She suffered a fall on ice and got a concussion and whiplash.  Pt has financial stressors at the current time, and is hoping that tax refund helps with at least a small amount of housing costs.  Pt states they are needing a lower cost housing situation.  SW mentions that [facility] is opening their list on March 1, and pt states she will plan to apply for that.  Pt mentions that she would like information on lower cost diapers, and SW sent resource list to her.  Pt states besides the financial stress, she is doing alright.        Care Gaps:    Health Maintenance Due   Topic Date Due     LIPID  Never done     DIABETIC FOOT EXAM  Never done     ADVANCE CARE PLANNING  Never done     DEPRESSION ACTION PLAN  Never done     EYE EXAM  Never done     Pneumococcal Vaccine: Pediatrics (0 to 5 Years) and At-Risk Patients (6 to 64 Years) (1 - PCV) Never done     YEARLY PREVENTIVE VISIT  04/07/2012     ASTHMA ACTION PLAN  03/13/2018     COVID-19 Vaccine (3 - Booster for Pfizer series) 11/05/2021     ASTHMA CONTROL TEST  12/07/2022     REPEAT ANTIBODY SCREEN (OB)  12/14/2022     A1C  12/26/2022           Care Plans  Care Plan: Community Resources       Care Plan: Community Resources     Problem: Severe financial stressors due to pregnancy complication and spouse layoff     Note:     Goal Statement: Over the next 6 months, Deborah will continue working with Care Coordination to ensure her  needs are met for her overall health and wellbeing.  Date Goal Set: 12/22/22  Barriers: Severe financial stressors  Strengths: Strong support system  Date to Achieve By: 6/22/23  Patient expressed understanding of goal: yes  Action steps to achieve this goal:  1. I will continue to follow up with medical team as needed  2. I will continue to complete paperwork for benefit eligibility and review  community resource information sent by SW  3. I will outreach to Care Coordination  for further questions or concerns        Goal: Establish Reliable Transportation                       Intervention/Education provided during outreach: yes          Plan:   Pt will review resource list.   Care Coordinator will follow up in one month.     Nohelia Iniguez,  [facility]  Clinic Care Coordinator  [facility]  [phone]  [email or web address]

## 2023-02-23 ENCOUNTER — PATIENT OUTREACH (OUTPATIENT)
Dept: CARE COORDINATION | Facility: CLINIC | Age: 28
End: 2023-02-23

## 2023-02-23 NOTE — PROGRESS NOTES
Clinic Care Coordination Contact  Program: [facility] Benefits/Laura Care   County:  [facility] Case #:  [facility] Worker:   [place] #:   Subscriber #:   Renewal:  Date Applied:     FRW Outreach:   2/23/23: FRW called pt. FRW emailed the laura care application due to pt is having issues with her mail. FRW will check billing notes in 20 days.   2/9/23: FRW called pt. Pt sent in laura care application. FRW and pt will connect in 2 weeks.  1/11/23: FRW called pt. Pt's SNAP is approved. FRW will get the rest of verifications in and FRW and pt will connect in 3-4 weeks.   12/21/22: FRW called pt and emailed the applications for pt to complete. FRW will reach out to pt in 3 weeks.     Health Insurance:      Referral/Screening:  Financial Resource Worker Screening     [facility] Benefits  Is patient requesting help applying for [facility] benefits?: Yes (SNAP and Cash Assistance)  Have you recently applied for any [facility] benefits?: Yes  What was applied for? : SNAP  Application date:: September 2022- Denied  How many people in your household?: 4  Do you buy/eat food together?: Yes  What is the monthly gross income for the household (wages, social security, workers comp, and pension)? : 0 (Pt's  recently lost his job- pt is on a leave with no benefits or income.)     Insurance:  [place] insurance?: Yes  Is this an insurance renewal?: No  Is this a new insurance application request?:  (unsure)     Any other information for the FRW?: Patient has outstanding [facility] bills.     GIANCARLO Savage  Clinic Care Coordination  [facility] Clinics: [facility], [place], [facility], and [facility]  Phone: [phone]

## 2023-02-24 ENCOUNTER — OFFICE VISIT (OUTPATIENT)
Dept: MATERNAL FETAL MEDICINE | Facility: CLINIC | Age: 28
End: 2023-02-24
Attending: OBSTETRICS & GYNECOLOGY
Payer: COMMERCIAL

## 2023-02-24 ENCOUNTER — HOSPITAL ENCOUNTER (OUTPATIENT)
Dept: ULTRASOUND IMAGING | Facility: CLINIC | Age: 28
Discharge: HOME | End: 2023-02-24
Attending: OBSTETRICS & GYNECOLOGY

## 2023-02-24 DIAGNOSIS — O35.8XX0 UMBILICAL VEIN ABNORMALITY AFFECTING PREGNANCY, SINGLE OR UNSPECIFIED FETUS: ICD-10-CM

## 2023-02-24 DIAGNOSIS — O35.8XX0 UMBILICAL VEIN ABNORMALITY AFFECTING PREGNANCY, SINGLE OR UNSPECIFIED FETUS: Primary | ICD-10-CM

## 2023-02-24 PROCEDURE — 76816 OB US FOLLOW-UP PER FETUS: CPT | Mod: 26 | Performed by: OBSTETRICS & GYNECOLOGY

## 2023-02-24 PROCEDURE — 76819 FETAL BIOPHYS PROFIL W/O NST: CPT | Mod: 26 | Performed by: OBSTETRICS & GYNECOLOGY

## 2023-02-24 PROCEDURE — 76816 OB US FOLLOW-UP PER FETUS: CPT

## 2023-02-24 PROCEDURE — 76819 FETAL BIOPHYS PROFIL W/O NST: CPT

## 2023-02-24 NOTE — PROGRESS NOTES
Please see the imaging tab for details of the ultrasound performed today.    Haydee Will MD  Specialist in Maternal-Fetal Medicine

## 2023-02-24 NOTE — NURSING NOTE
Patient presents to [facility] for RL2/BPP at 38+2 weeks due to umbilical vein varix check and GDM die control. Positive fetal movement. Denies LOF, vaginal bleeding or cramping/contractions. Patient states BG levels are largely normal.  SBAR given to ALYCE MD, see their note in Epic. Patient is schedule for repeat C/S on 3/2/2023.

## 2023-03-01 ENCOUNTER — HOSPITAL ENCOUNTER (INPATIENT)
Facility: CLINIC | Age: 28
LOS: 3 days | Discharge: HOME | End: 2023-03-04
Attending: STUDENT IN AN ORGANIZED HEALTH CARE EDUCATION/TRAINING PROGRAM | Admitting: STUDENT IN AN ORGANIZED HEALTH CARE EDUCATION/TRAINING PROGRAM
Payer: COMMERCIAL

## 2023-03-01 ENCOUNTER — ANESTHESIA EVENT (OUTPATIENT)
Dept: OBGYN | Facility: CLINIC | Age: 28
End: 2023-03-01

## 2023-03-01 ENCOUNTER — ANESTHESIA (OUTPATIENT)
Dept: OBGYN | Facility: CLINIC | Age: 28
End: 2023-03-01

## 2023-03-01 DIAGNOSIS — O09.93 SUPERVISION OF HIGH RISK PREGNANCY IN THIRD TRIMESTER: ICD-10-CM

## 2023-03-01 DIAGNOSIS — O34.219 PREVIOUS CESAREAN DELIVERY, ANTEPARTUM: Primary | ICD-10-CM

## 2023-03-01 LAB
ABO/RH(D): NORMAL
ALT SERPL W P-5'-P-CCNC: 25 U/L (ref 10–35)
ANION GAP SERPL CALCULATED.3IONS-SCNC: 10 MMOL/L (ref 7–15)
ANTIBODY SCREEN: NEGATIVE
AST SERPL W P-5'-P-CCNC: 42 U/L (ref 10–35)
BUN SERPL-MCNC: 8.2 MG/DL (ref 6–20)
CALCIUM SERPL-MCNC: 9.1 MG/DL (ref 8.6–10)
CHLORIDE SERPL-SCNC: 103 MMOL/L (ref 98–107)
CREAT SERPL-MCNC: 0.58 MG/DL (ref 0.51–0.95)
CREAT SERPL-MCNC: 0.62 MG/DL (ref 0.51–0.95)
DEPRECATED HCO3 PLAS-SCNC: 24 MMOL/L (ref 22–29)
ERYTHROCYTE [DISTWIDTH] IN BLOOD BY AUTOMATED COUNT: 16.3 % (ref 10–15)
ERYTHROCYTE [DISTWIDTH] IN BLOOD BY AUTOMATED COUNT: 16.3 % (ref 10–15)
GFR SERPL CREATININE-BSD FRML MDRD: >90 ML/MIN/1.73M2
GFR SERPL CREATININE-BSD FRML MDRD: >90 ML/MIN/1.73M2
GLUCOSE BLDC GLUCOMTR-MCNC: 71 MG/DL (ref 70–99)
GLUCOSE SERPL-MCNC: 72 MG/DL (ref 70–99)
HCT VFR BLD AUTO: 34 % (ref 35–47)
HCT VFR BLD AUTO: 36.3 % (ref 35–47)
HGB BLD-MCNC: 11.4 G/DL (ref 11.7–15.7)
HGB BLD-MCNC: 11.9 G/DL (ref 11.7–15.7)
HGB BLD-MCNC: 12.5 G/DL (ref 11.7–15.7)
MCH RBC QN AUTO: 28.8 PG (ref 26.5–33)
MCH RBC QN AUTO: 29.5 PG (ref 26.5–33)
MCHC RBC AUTO-ENTMCNC: 32.8 G/DL (ref 31.5–36.5)
MCHC RBC AUTO-ENTMCNC: 33.5 G/DL (ref 31.5–36.5)
MCV RBC AUTO: 88 FL (ref 78–100)
MCV RBC AUTO: 88 FL (ref 78–100)
PLATELET # BLD AUTO: 77 10E3/UL (ref 150–450)
PLATELET # BLD AUTO: 77 10E3/UL (ref 150–450)
PLATELET # BLD AUTO: 81 10E3/UL (ref 150–450)
PLATELET # BLD AUTO: 85 10E3/UL (ref 150–450)
POTASSIUM SERPL-SCNC: 3.9 MMOL/L (ref 3.4–5.3)
RBC # BLD AUTO: 3.87 10E6/UL (ref 3.8–5.2)
RBC # BLD AUTO: 4.13 10E6/UL (ref 3.8–5.2)
SODIUM SERPL-SCNC: 137 MMOL/L (ref 136–145)
SPECIMEN EXPIRATION DATE: NORMAL
T PALLIDUM AB SER QL: NONREACTIVE
WBC # BLD AUTO: 11.6 10E3/UL (ref 4–11)
WBC # BLD AUTO: 13.4 10E3/UL (ref 4–11)

## 2023-03-01 PROCEDURE — 58611 LIGATE OVIDUCT(S) ADD-ON: CPT | Performed by: STUDENT IN AN ORGANIZED HEALTH CARE EDUCATION/TRAINING PROGRAM

## 2023-03-01 PROCEDURE — 360N000076 HC SURGERY LEVEL 3, PER MIN: Performed by: STUDENT IN AN ORGANIZED HEALTH CARE EDUCATION/TRAINING PROGRAM

## 2023-03-01 PROCEDURE — 250N000011 HC RX IP 250 OP 636: Performed by: STUDENT IN AN ORGANIZED HEALTH CARE EDUCATION/TRAINING PROGRAM

## 2023-03-01 PROCEDURE — 85049 AUTOMATED PLATELET COUNT: CPT | Performed by: STUDENT IN AN ORGANIZED HEALTH CARE EDUCATION/TRAINING PROGRAM

## 2023-03-01 PROCEDURE — 82565 ASSAY OF CREATININE: CPT | Performed by: STUDENT IN AN ORGANIZED HEALTH CARE EDUCATION/TRAINING PROGRAM

## 2023-03-01 PROCEDURE — 120N000012 HC R&B POSTPARTUM

## 2023-03-01 PROCEDURE — 36415 COLL VENOUS BLD VENIPUNCTURE: CPT | Performed by: STUDENT IN AN ORGANIZED HEALTH CARE EDUCATION/TRAINING PROGRAM

## 2023-03-01 PROCEDURE — 250N000013 HC RX MED GY IP 250 OP 250 PS 637: Performed by: STUDENT IN AN ORGANIZED HEALTH CARE EDUCATION/TRAINING PROGRAM

## 2023-03-01 PROCEDURE — 86850 RBC ANTIBODY SCREEN: CPT | Performed by: STUDENT IN AN ORGANIZED HEALTH CARE EDUCATION/TRAINING PROGRAM

## 2023-03-01 PROCEDURE — 250N000011 HC RX IP 250 OP 636: Performed by: ANESTHESIOLOGY

## 2023-03-01 PROCEDURE — 88302 TISSUE EXAM BY PATHOLOGIST: CPT | Mod: TC | Performed by: STUDENT IN AN ORGANIZED HEALTH CARE EDUCATION/TRAINING PROGRAM

## 2023-03-01 PROCEDURE — 710N000009 HC RECOVERY PHASE 1, LEVEL 1, PER MIN: Performed by: STUDENT IN AN ORGANIZED HEALTH CARE EDUCATION/TRAINING PROGRAM

## 2023-03-01 PROCEDURE — 0UT70ZZ RESECTION OF BILATERAL FALLOPIAN TUBES, OPEN APPROACH: ICD-10-PCS | Performed by: STUDENT IN AN ORGANIZED HEALTH CARE EDUCATION/TRAINING PROGRAM

## 2023-03-01 PROCEDURE — 84450 TRANSFERASE (AST) (SGOT): CPT | Performed by: OBSTETRICS & GYNECOLOGY

## 2023-03-01 PROCEDURE — 36415 COLL VENOUS BLD VENIPUNCTURE: CPT | Performed by: OBSTETRICS & GYNECOLOGY

## 2023-03-01 PROCEDURE — 370N000017 HC ANESTHESIA TECHNICAL FEE, PER MIN: Performed by: STUDENT IN AN ORGANIZED HEALTH CARE EDUCATION/TRAINING PROGRAM

## 2023-03-01 PROCEDURE — 999N000016 HC STATISTIC ATTENDANCE AT DELIVERY

## 2023-03-01 PROCEDURE — 272N000001 HC OR GENERAL SUPPLY STERILE: Performed by: STUDENT IN AN ORGANIZED HEALTH CARE EDUCATION/TRAINING PROGRAM

## 2023-03-01 PROCEDURE — 250N000009 HC RX 250: Performed by: STUDENT IN AN ORGANIZED HEALTH CARE EDUCATION/TRAINING PROGRAM

## 2023-03-01 PROCEDURE — 86901 BLOOD TYPING SEROLOGIC RH(D): CPT | Performed by: STUDENT IN AN ORGANIZED HEALTH CARE EDUCATION/TRAINING PROGRAM

## 2023-03-01 PROCEDURE — G0463 HOSPITAL OUTPT CLINIC VISIT: HCPCS

## 2023-03-01 PROCEDURE — 258N000003 HC RX IP 258 OP 636: Performed by: NURSE ANESTHETIST, CERTIFIED REGISTERED

## 2023-03-01 PROCEDURE — 258N000003 HC RX IP 258 OP 636: Performed by: STUDENT IN AN ORGANIZED HEALTH CARE EDUCATION/TRAINING PROGRAM

## 2023-03-01 PROCEDURE — 86780 TREPONEMA PALLIDUM: CPT | Performed by: STUDENT IN AN ORGANIZED HEALTH CARE EDUCATION/TRAINING PROGRAM

## 2023-03-01 PROCEDURE — 88302 TISSUE EXAM BY PATHOLOGIST: CPT | Mod: 26 | Performed by: STUDENT IN AN ORGANIZED HEALTH CARE EDUCATION/TRAINING PROGRAM

## 2023-03-01 PROCEDURE — 84460 ALANINE AMINO (ALT) (SGPT): CPT | Performed by: OBSTETRICS & GYNECOLOGY

## 2023-03-01 PROCEDURE — 59515 CESAREAN DELIVERY: CPT | Performed by: STUDENT IN AN ORGANIZED HEALTH CARE EDUCATION/TRAINING PROGRAM

## 2023-03-01 PROCEDURE — 82310 ASSAY OF CALCIUM: CPT | Performed by: OBSTETRICS & GYNECOLOGY

## 2023-03-01 PROCEDURE — 250N000011 HC RX IP 250 OP 636: Performed by: NURSE ANESTHETIST, CERTIFIED REGISTERED

## 2023-03-01 PROCEDURE — 85027 COMPLETE CBC AUTOMATED: CPT | Performed by: STUDENT IN AN ORGANIZED HEALTH CARE EDUCATION/TRAINING PROGRAM

## 2023-03-01 PROCEDURE — 250N000013 HC RX MED GY IP 250 OP 250 PS 637

## 2023-03-01 PROCEDURE — 250N000011 HC RX IP 250 OP 636

## 2023-03-01 PROCEDURE — 85027 COMPLETE CBC AUTOMATED: CPT | Performed by: OBSTETRICS & GYNECOLOGY

## 2023-03-01 RX ORDER — NALOXONE HYDROCHLORIDE 0.4 MG/ML
0.4 INJECTION, SOLUTION INTRAMUSCULAR; INTRAVENOUS; SUBCUTANEOUS
Status: DISCONTINUED | OUTPATIENT
Start: 2023-03-01 | End: 2023-03-04 | Stop reason: HOSPADM

## 2023-03-01 RX ORDER — MISOPROSTOL 200 UG/1
400 TABLET ORAL
Status: DISCONTINUED | OUTPATIENT
Start: 2023-03-01 | End: 2023-03-04 | Stop reason: HOSPADM

## 2023-03-01 RX ORDER — SODIUM CHLORIDE, SODIUM LACTATE, POTASSIUM CHLORIDE, CALCIUM CHLORIDE 600; 310; 30; 20 MG/100ML; MG/100ML; MG/100ML; MG/100ML
INJECTION, SOLUTION INTRAVENOUS CONTINUOUS
Status: DISCONTINUED | OUTPATIENT
Start: 2023-03-01 | End: 2023-03-01 | Stop reason: HOSPADM

## 2023-03-01 RX ORDER — DIPHENHYDRAMINE HCL 25 MG
25 CAPSULE ORAL EVERY 6 HOURS PRN
Status: COMPLETED | OUTPATIENT
Start: 2023-03-01 | End: 2023-03-01

## 2023-03-01 RX ORDER — AMOXICILLIN 250 MG
1 CAPSULE ORAL 2 TIMES DAILY
Status: DISCONTINUED | OUTPATIENT
Start: 2023-03-01 | End: 2023-03-04 | Stop reason: HOSPADM

## 2023-03-01 RX ORDER — OXYTOCIN/0.9 % SODIUM CHLORIDE 30/500 ML
340 PLASTIC BAG, INJECTION (ML) INTRAVENOUS CONTINUOUS PRN
Status: DISCONTINUED | OUTPATIENT
Start: 2023-03-01 | End: 2023-03-01 | Stop reason: HOSPADM

## 2023-03-01 RX ORDER — METOCLOPRAMIDE HYDROCHLORIDE 5 MG/ML
10 INJECTION INTRAMUSCULAR; INTRAVENOUS EVERY 6 HOURS PRN
Status: DISCONTINUED | OUTPATIENT
Start: 2023-03-01 | End: 2023-03-04 | Stop reason: HOSPADM

## 2023-03-01 RX ORDER — TRANEXAMIC ACID 10 MG/ML
1 INJECTION, SOLUTION INTRAVENOUS EVERY 30 MIN PRN
Status: DISCONTINUED | OUTPATIENT
Start: 2023-03-01 | End: 2023-03-01 | Stop reason: HOSPADM

## 2023-03-01 RX ORDER — HYDROCORTISONE 25 MG/G
CREAM TOPICAL 3 TIMES DAILY PRN
Status: DISCONTINUED | OUTPATIENT
Start: 2023-03-01 | End: 2023-03-04 | Stop reason: HOSPADM

## 2023-03-01 RX ORDER — BISACODYL 10 MG
10 SUPPOSITORY, RECTAL RECTAL DAILY PRN
Status: DISCONTINUED | OUTPATIENT
Start: 2023-03-03 | End: 2023-03-04 | Stop reason: HOSPADM

## 2023-03-01 RX ORDER — METOCLOPRAMIDE 10 MG/1
10 TABLET ORAL EVERY 6 HOURS PRN
Status: DISCONTINUED | OUTPATIENT
Start: 2023-03-01 | End: 2023-03-04 | Stop reason: HOSPADM

## 2023-03-01 RX ORDER — ONDANSETRON 2 MG/ML
4 INJECTION INTRAMUSCULAR; INTRAVENOUS EVERY 6 HOURS PRN
Status: DISCONTINUED | OUTPATIENT
Start: 2023-03-01 | End: 2023-03-04 | Stop reason: HOSPADM

## 2023-03-01 RX ORDER — ACETAMINOPHEN 325 MG/1
975 TABLET ORAL EVERY 6 HOURS
Status: COMPLETED | OUTPATIENT
Start: 2023-03-01 | End: 2023-03-04

## 2023-03-01 RX ORDER — NALOXONE HYDROCHLORIDE 0.4 MG/ML
0.2 INJECTION, SOLUTION INTRAMUSCULAR; INTRAVENOUS; SUBCUTANEOUS
Status: DISCONTINUED | OUTPATIENT
Start: 2023-03-01 | End: 2023-03-04 | Stop reason: HOSPADM

## 2023-03-01 RX ORDER — ENOXAPARIN SODIUM 100 MG/ML
40 INJECTION SUBCUTANEOUS EVERY 24 HOURS
Status: CANCELLED | OUTPATIENT
Start: 2023-03-01

## 2023-03-01 RX ORDER — OXYTOCIN/0.9 % SODIUM CHLORIDE 30/500 ML
340 PLASTIC BAG, INJECTION (ML) INTRAVENOUS CONTINUOUS PRN
Status: DISCONTINUED | OUTPATIENT
Start: 2023-03-01 | End: 2023-03-04 | Stop reason: HOSPADM

## 2023-03-01 RX ORDER — OXYTOCIN 10 [USP'U]/ML
10 INJECTION, SOLUTION INTRAMUSCULAR; INTRAVENOUS
Status: DISCONTINUED | OUTPATIENT
Start: 2023-03-01 | End: 2023-03-04 | Stop reason: HOSPADM

## 2023-03-01 RX ORDER — LIDOCAINE 40 MG/G
CREAM TOPICAL
Status: DISCONTINUED | OUTPATIENT
Start: 2023-03-01 | End: 2023-03-01 | Stop reason: HOSPADM

## 2023-03-01 RX ORDER — CEFAZOLIN SODIUM/WATER 2 G/20 ML
SYRINGE (ML) INTRAVENOUS
Status: COMPLETED
Start: 2023-03-01 | End: 2023-03-01

## 2023-03-01 RX ORDER — SIMETHICONE 80 MG
80 TABLET,CHEWABLE ORAL 4 TIMES DAILY PRN
Status: DISCONTINUED | OUTPATIENT
Start: 2023-03-01 | End: 2023-03-04 | Stop reason: HOSPADM

## 2023-03-01 RX ORDER — DIPHENHYDRAMINE HYDROCHLORIDE 50 MG/ML
25 INJECTION INTRAMUSCULAR; INTRAVENOUS EVERY 6 HOURS PRN
Status: COMPLETED | OUTPATIENT
Start: 2023-03-01 | End: 2023-03-01

## 2023-03-01 RX ORDER — CEFAZOLIN SODIUM/WATER 2 G/20 ML
2 SYRINGE (ML) INTRAVENOUS SEE ADMIN INSTRUCTIONS
Status: DISCONTINUED | OUTPATIENT
Start: 2023-03-01 | End: 2023-03-01 | Stop reason: HOSPADM

## 2023-03-01 RX ORDER — MISOPROSTOL 200 UG/1
800 TABLET ORAL
Status: DISCONTINUED | OUTPATIENT
Start: 2023-03-01 | End: 2023-03-04 | Stop reason: HOSPADM

## 2023-03-01 RX ORDER — CITRIC ACID/SODIUM CITRATE 334-500MG
SOLUTION, ORAL ORAL
Status: COMPLETED
Start: 2023-03-01 | End: 2023-03-01

## 2023-03-01 RX ORDER — ACETAMINOPHEN 325 MG/1
TABLET ORAL
Status: COMPLETED
Start: 2023-03-01 | End: 2023-03-01

## 2023-03-01 RX ORDER — SERTRALINE HYDROCHLORIDE 100 MG/1
100 TABLET, FILM COATED ORAL DAILY
Status: DISCONTINUED | OUTPATIENT
Start: 2023-03-01 | End: 2023-03-04 | Stop reason: HOSPADM

## 2023-03-01 RX ORDER — MISOPROSTOL 200 UG/1
800 TABLET ORAL
Status: DISCONTINUED | OUTPATIENT
Start: 2023-03-01 | End: 2023-03-01 | Stop reason: HOSPADM

## 2023-03-01 RX ORDER — CITRIC ACID/SODIUM CITRATE 334-500MG
30 SOLUTION, ORAL ORAL
Status: COMPLETED | OUTPATIENT
Start: 2023-03-01 | End: 2023-03-01

## 2023-03-01 RX ORDER — MORPHINE SULFATE 1 MG/ML
INJECTION, SOLUTION EPIDURAL; INTRATHECAL; INTRAVENOUS PRN
Status: DISCONTINUED | OUTPATIENT
Start: 2023-03-01 | End: 2023-03-01

## 2023-03-01 RX ORDER — ONDANSETRON 4 MG/1
4 TABLET, ORALLY DISINTEGRATING ORAL EVERY 6 HOURS PRN
Status: DISCONTINUED | OUTPATIENT
Start: 2023-03-01 | End: 2023-03-01

## 2023-03-01 RX ORDER — MECLIZINE HYDROCHLORIDE 25 MG/1
25 TABLET ORAL 3 TIMES DAILY PRN
Status: DISCONTINUED | OUTPATIENT
Start: 2023-03-01 | End: 2023-03-04 | Stop reason: HOSPADM

## 2023-03-01 RX ORDER — TRANEXAMIC ACID 10 MG/ML
1 INJECTION, SOLUTION INTRAVENOUS EVERY 30 MIN PRN
Status: DISCONTINUED | OUTPATIENT
Start: 2023-03-01 | End: 2023-03-04 | Stop reason: HOSPADM

## 2023-03-01 RX ORDER — CARBOPROST TROMETHAMINE 250 UG/ML
250 INJECTION, SOLUTION INTRAMUSCULAR
Status: DISCONTINUED | OUTPATIENT
Start: 2023-03-01 | End: 2023-03-04 | Stop reason: HOSPADM

## 2023-03-01 RX ORDER — IBUPROFEN 400 MG/1
800 TABLET, FILM COATED ORAL EVERY 6 HOURS PRN
Status: DISCONTINUED | OUTPATIENT
Start: 2023-03-01 | End: 2023-03-04 | Stop reason: HOSPADM

## 2023-03-01 RX ORDER — KETOROLAC TROMETHAMINE 30 MG/ML
30 INJECTION, SOLUTION INTRAMUSCULAR; INTRAVENOUS EVERY 6 HOURS
Status: ACTIVE | OUTPATIENT
Start: 2023-03-01 | End: 2023-03-02

## 2023-03-01 RX ORDER — OXYTOCIN/0.9 % SODIUM CHLORIDE 30/500 ML
100-340 PLASTIC BAG, INJECTION (ML) INTRAVENOUS CONTINUOUS PRN
Status: DISCONTINUED | OUTPATIENT
Start: 2023-03-01 | End: 2023-03-01

## 2023-03-01 RX ORDER — ONDANSETRON 4 MG/1
4 TABLET, ORALLY DISINTEGRATING ORAL EVERY 6 HOURS PRN
Status: DISCONTINUED | OUTPATIENT
Start: 2023-03-01 | End: 2023-03-04 | Stop reason: HOSPADM

## 2023-03-01 RX ORDER — BUPIVACAINE HYDROCHLORIDE 7.5 MG/ML
INJECTION, SOLUTION INTRASPINAL PRN
Status: DISCONTINUED | OUTPATIENT
Start: 2023-03-01 | End: 2023-03-01

## 2023-03-01 RX ORDER — MODIFIED LANOLIN
OINTMENT (GRAM) TOPICAL
Status: DISCONTINUED | OUTPATIENT
Start: 2023-03-01 | End: 2023-03-04 | Stop reason: HOSPADM

## 2023-03-01 RX ORDER — ONDANSETRON 2 MG/ML
INJECTION INTRAMUSCULAR; INTRAVENOUS PRN
Status: DISCONTINUED | OUTPATIENT
Start: 2023-03-01 | End: 2023-03-01

## 2023-03-01 RX ORDER — METHYLERGONOVINE MALEATE 0.2 MG/ML
200 INJECTION INTRAVENOUS
Status: DISCONTINUED | OUTPATIENT
Start: 2023-03-01 | End: 2023-03-04 | Stop reason: HOSPADM

## 2023-03-01 RX ORDER — HYDROMORPHONE HYDROCHLORIDE 1 MG/ML
.3-.5 INJECTION, SOLUTION INTRAMUSCULAR; INTRAVENOUS; SUBCUTANEOUS EVERY 30 MIN PRN
Status: DISCONTINUED | OUTPATIENT
Start: 2023-03-01 | End: 2023-03-04 | Stop reason: HOSPADM

## 2023-03-01 RX ORDER — AZITHROMYCIN 500 MG/1
500 INJECTION, POWDER, LYOPHILIZED, FOR SOLUTION INTRAVENOUS
Status: COMPLETED | OUTPATIENT
Start: 2023-03-01 | End: 2023-03-01

## 2023-03-01 RX ORDER — ONDANSETRON 2 MG/ML
4 INJECTION INTRAMUSCULAR; INTRAVENOUS EVERY 6 HOURS PRN
Status: DISCONTINUED | OUTPATIENT
Start: 2023-03-01 | End: 2023-03-01

## 2023-03-01 RX ORDER — ACETAMINOPHEN 325 MG/1
650 TABLET ORAL EVERY 4 HOURS PRN
Status: DISCONTINUED | OUTPATIENT
Start: 2023-03-04 | End: 2023-03-04 | Stop reason: HOSPADM

## 2023-03-01 RX ORDER — OXYCODONE HYDROCHLORIDE 5 MG/1
5 TABLET ORAL EVERY 4 HOURS PRN
Status: DISCONTINUED | OUTPATIENT
Start: 2023-03-01 | End: 2023-03-04 | Stop reason: HOSPADM

## 2023-03-01 RX ORDER — OXYTOCIN 10 [USP'U]/ML
10 INJECTION, SOLUTION INTRAMUSCULAR; INTRAVENOUS
Status: DISCONTINUED | OUTPATIENT
Start: 2023-03-01 | End: 2023-03-01 | Stop reason: HOSPADM

## 2023-03-01 RX ORDER — AZITHROMYCIN 500 MG/1
INJECTION, POWDER, LYOPHILIZED, FOR SOLUTION INTRAVENOUS
Status: COMPLETED
Start: 2023-03-01 | End: 2023-03-01

## 2023-03-01 RX ORDER — ACETAMINOPHEN 325 MG/1
975 TABLET ORAL ONCE
Status: COMPLETED | OUTPATIENT
Start: 2023-03-01 | End: 2023-03-01

## 2023-03-01 RX ORDER — AMOXICILLIN 250 MG
2 CAPSULE ORAL 2 TIMES DAILY
Status: DISCONTINUED | OUTPATIENT
Start: 2023-03-01 | End: 2023-03-04 | Stop reason: HOSPADM

## 2023-03-01 RX ORDER — OXYTOCIN 10 [USP'U]/ML
10 INJECTION, SOLUTION INTRAMUSCULAR; INTRAVENOUS
Status: DISCONTINUED | OUTPATIENT
Start: 2023-03-01 | End: 2023-03-01

## 2023-03-01 RX ORDER — DIPHENHYDRAMINE HYDROCHLORIDE 50 MG/ML
INJECTION INTRAMUSCULAR; INTRAVENOUS
Status: COMPLETED
Start: 2023-03-01 | End: 2023-03-01

## 2023-03-01 RX ORDER — LIDOCAINE 40 MG/G
CREAM TOPICAL
Status: DISCONTINUED | OUTPATIENT
Start: 2023-03-01 | End: 2023-03-04 | Stop reason: HOSPADM

## 2023-03-01 RX ORDER — PROCHLORPERAZINE MALEATE 10 MG
10 TABLET ORAL EVERY 6 HOURS PRN
Status: DISCONTINUED | OUTPATIENT
Start: 2023-03-01 | End: 2023-03-04 | Stop reason: HOSPADM

## 2023-03-01 RX ORDER — DEXTROSE, SODIUM CHLORIDE, SODIUM LACTATE, POTASSIUM CHLORIDE, AND CALCIUM CHLORIDE 5; .6; .31; .03; .02 G/100ML; G/100ML; G/100ML; G/100ML; G/100ML
INJECTION, SOLUTION INTRAVENOUS CONTINUOUS
Status: DISCONTINUED | OUTPATIENT
Start: 2023-03-01 | End: 2023-03-04 | Stop reason: HOSPADM

## 2023-03-01 RX ORDER — PROCHLORPERAZINE 25 MG
25 SUPPOSITORY, RECTAL RECTAL EVERY 12 HOURS PRN
Status: DISCONTINUED | OUTPATIENT
Start: 2023-03-01 | End: 2023-03-04 | Stop reason: HOSPADM

## 2023-03-01 RX ORDER — CEFAZOLIN SODIUM/WATER 2 G/20 ML
2 SYRINGE (ML) INTRAVENOUS
Status: COMPLETED | OUTPATIENT
Start: 2023-03-01 | End: 2023-03-01

## 2023-03-01 RX ORDER — CARBOPROST TROMETHAMINE 250 UG/ML
250 INJECTION, SOLUTION INTRAMUSCULAR
Status: DISCONTINUED | OUTPATIENT
Start: 2023-03-01 | End: 2023-03-01 | Stop reason: HOSPADM

## 2023-03-01 RX ORDER — METHYLERGONOVINE MALEATE 0.2 MG/ML
200 INJECTION INTRAVENOUS
Status: DISCONTINUED | OUTPATIENT
Start: 2023-03-01 | End: 2023-03-01 | Stop reason: HOSPADM

## 2023-03-01 RX ORDER — MISOPROSTOL 200 UG/1
400 TABLET ORAL
Status: DISCONTINUED | OUTPATIENT
Start: 2023-03-01 | End: 2023-03-01 | Stop reason: HOSPADM

## 2023-03-01 RX ORDER — NALBUPHINE HYDROCHLORIDE 10 MG/ML
2.5-5 INJECTION, SOLUTION INTRAMUSCULAR; INTRAVENOUS; SUBCUTANEOUS EVERY 6 HOURS PRN
Status: DISCONTINUED | OUTPATIENT
Start: 2023-03-01 | End: 2023-03-01

## 2023-03-01 RX ADMIN — METHYLERGONOVINE MALEATE 200 MCG: 0.2 INJECTION INTRAVENOUS at 08:23

## 2023-03-01 RX ADMIN — SODIUM CHLORIDE, SODIUM LACTATE, POTASSIUM CHLORIDE, CALCIUM CHLORIDE AND DEXTROSE MONOHYDRATE: 5; 600; 310; 30; 20 INJECTION, SOLUTION INTRAVENOUS at 11:23

## 2023-03-01 RX ADMIN — Medication 340 ML/HR: at 07:18

## 2023-03-01 RX ADMIN — SODIUM CHLORIDE, POTASSIUM CHLORIDE, SODIUM LACTATE AND CALCIUM CHLORIDE: 600; 310; 30; 20 INJECTION, SOLUTION INTRAVENOUS at 06:30

## 2023-03-01 RX ADMIN — PHENYLEPHRINE HYDROCHLORIDE 0.5 MCG/KG/MIN: 10 INJECTION INTRAVENOUS at 06:59

## 2023-03-01 RX ADMIN — MORPHINE SULFATE 200 MCG: 1 INJECTION, SOLUTION EPIDURAL; INTRATHECAL; INTRAVENOUS at 06:59

## 2023-03-01 RX ADMIN — SODIUM CITRATE AND CITRIC ACID MONOHYDRATE 30 ML: 500; 334 SOLUTION ORAL at 06:20

## 2023-03-01 RX ADMIN — ONDANSETRON 4 MG: 2 INJECTION INTRAMUSCULAR; INTRAVENOUS at 07:01

## 2023-03-01 RX ADMIN — ACETAMINOPHEN 975 MG: 325 TABLET, FILM COATED ORAL at 18:37

## 2023-03-01 RX ADMIN — SODIUM CITRATE AND CITRIC ACID MONOHYDRATE 30 ML: 500; 334 SOLUTION ORAL at 06:15

## 2023-03-01 RX ADMIN — ACETAMINOPHEN 975 MG: 325 TABLET, FILM COATED ORAL at 06:15

## 2023-03-01 RX ADMIN — ACETAMINOPHEN 975 MG: 325 TABLET, FILM COATED ORAL at 12:13

## 2023-03-01 RX ADMIN — DIPHENHYDRAMINE HYDROCHLORIDE 25 MG: 50 INJECTION, SOLUTION INTRAMUSCULAR; INTRAVENOUS at 08:58

## 2023-03-01 RX ADMIN — SODIUM CHLORIDE, SODIUM LACTATE, POTASSIUM CHLORIDE, CALCIUM CHLORIDE AND DEXTROSE MONOHYDRATE: 5; 600; 310; 30; 20 INJECTION, SOLUTION INTRAVENOUS at 11:20

## 2023-03-01 RX ADMIN — ACETAMINOPHEN 975 MG: 325 TABLET ORAL at 06:15

## 2023-03-01 RX ADMIN — Medication 2 G: at 06:49

## 2023-03-01 RX ADMIN — SODIUM CHLORIDE, POTASSIUM CHLORIDE, SODIUM LACTATE AND CALCIUM CHLORIDE: 600; 310; 30; 20 INJECTION, SOLUTION INTRAVENOUS at 05:50

## 2023-03-01 RX ADMIN — AZITHROMYCIN MONOHYDRATE 500 MG: 500 INJECTION, POWDER, LYOPHILIZED, FOR SOLUTION INTRAVENOUS at 06:49

## 2023-03-01 RX ADMIN — TRANEXAMIC ACID 1 G: 10 INJECTION, SOLUTION INTRAVENOUS at 07:00

## 2023-03-01 RX ADMIN — BUPIVACAINE HYDROCHLORIDE IN DEXTROSE 12 MG: 7.5 INJECTION, SOLUTION SUBARACHNOID at 06:59

## 2023-03-01 RX ADMIN — SENNOSIDES AND DOCUSATE SODIUM 1 TABLET: 50; 8.6 TABLET ORAL at 19:50

## 2023-03-01 RX ADMIN — DIPHENHYDRAMINE HYDROCHLORIDE 25 MG: 50 INJECTION INTRAMUSCULAR; INTRAVENOUS at 08:58

## 2023-03-01 ASSESSMENT — ACTIVITIES OF DAILY LIVING (ADL)
ADLS_ACUITY_SCORE: 18
ADLS_ACUITY_SCORE: 33
ADLS_ACUITY_SCORE: 20
ADLS_ACUITY_SCORE: 20
ADLS_ACUITY_SCORE: 18
ADLS_ACUITY_SCORE: 20

## 2023-03-01 ASSESSMENT — LIFESTYLE VARIABLES: TOBACCO_USE: 1

## 2023-03-01 ASSESSMENT — ENCOUNTER SYMPTOMS: SEIZURES: 0

## 2023-03-01 NOTE — ANESTHESIA CARE TRANSFER NOTE
Patient: Deborah Swartz    Procedure: Procedure(s):  Repeat  Section with Bilateral Salpingectomy       Diagnosis: Supervision of high risk pregnancy in third trimester [O09.93]  History of  section complicating pregnancy [O34.219]  Diagnosis Additional Information: No value filed.    Anesthesia Type:   Spinal     Note:    Oropharynx: oropharynx clear of all foreign objects and spontaneously breathing  Level of Consciousness: awake  Oxygen Supplementation: room air      Dentition: dentition unchanged  Vital Signs Stable: post-procedure vital signs reviewed and stable  Report to RN Given: handoff report given  Patient transferred to: Labor and Delivery    Handoff Report: Identifed the Patient, Identified the Reponsible Provider, Reviewed the pertinent medical history, Discussed the surgical course, Reviewed Intra-OP anesthesia mangement and issues during anesthesia, Set expectations for post-procedure period and Allowed opportunity for questions and acknowledgement of understanding      Vitals:  Vitals Value Taken Time   BP     Temp     Pulse     Resp     SpO2         Electronically Signed By: NIRAJ Whitlock CRNA  2023  8:28 AM

## 2023-03-01 NOTE — OP NOTE
[facility]  Operative Note     Surgery Date:  3/1/2023    Surgeon:  Mary Giles MD, S    Pre-op Diagnosis:    - Intrauterine pregnancy at 39w0d  - SROM  - Umbilical vein varix  - Hx CS x 2, desires sterilization  - Hx preE w/o SF    - Migraine HA   - BMI 34  - MDD  - GDMA1    Post-op Diagnosis:    - Same   - Liveborn male infant     Procedure(s):  Repeat low-transverse  section via Pfannenstiel sking incision    Bilateral salpingectomy    Anesthesia: Spinal    Delivery QBL (mL): 660mL +200mL on fundal massage     Drains: Bear Catheter     Specimens:  Cord blood, bilateral fallopian tubes     Complications: None apparent    Indications:   Deborah Swartz is a 27 year old  at 39w0d admitted SROM and unscheduled repeat CS.  The risks, benefits, and alternatives of  section were discussed with the patient, and she agreed to proceed.     Findings:   1. Meconium stained amniotic fluid  2. Liveborn male infant in cephalic presentation. Apgars 8 at 1 minute & 9 at 5 minutes. Weight 1dey9wa.  3. Normal uterus, fallopian tubes, and ovaries. Intramural right fundal fibroid. Minimal retrofacial adhesions.     Procedure Details:   The patient was brought to the OR, where adequate spinal anesthesia was administered.  She was placed in the dorsal supine position with a slight leftward tilt. She was prepped and draped in the usual sterile fashion. Pre-op azithromycin, Ancef, and TXA administered. A surgical time out was performed. A Pfannenstiel skin incision was made over the previous incision with the scalpel, and carried down to the underlying fascia with sharp and blunt dissection. The fascia was incised in the midline, and the incision was extended laterally with the Felton scissors. The superior aspect of the fascia was grasped with the Kocher clamps and dissected off of the underlying rectus muscles with blunt and sharp dissection. Attention was then turned to the inferior aspect of  the fascia, which was similarly dissected off of the underlying rectus muscles. The rectus muscles were  in the midline, and the peritoneum was entered bluntly, and the opening was extended with digital pressure. The bladder blade was placed.  A transverse hysterotomy was made with the scalpel in the lower uterine segment, and the incision was extended with digital pressure. The infant was noted to be in the cephalic position. A vacuum extractor was used to delivery the fetus and fetus was delivered atraumatically. The shoulders delivered easily.  No nuchal cord was noted. The cord was doubly clamped and cut, and the infant was handed off to the awaiting nursery staff. A segment of cord was cut and saved. The placenta was delivered with gentle traction on the umbilical cord and uterine massage. The uterus was exteriorized and cleared of all clots and debris.   The hysterotomy was closed with a running locked suture of 0 Vicryl.  Uterine tone was noted to be firm. Bleeding at right aspect of hysterotomy was controlled with figure of X 0-Monocryl. Attention turned to bilateral salpingectomy. The right fallopian tube was grasped with Yara clamps and followed to the fimbriated end. The Ligasure device was used to serially clamp, cauterize, and cut off of the underlying mesosalpinx. The procedure was repeated in an identical manner on the left fallopian tube. Specimens were handed off and sent to pathology.   The hysterotomy was inspected and noted to be hemostatic. The posterior cul-de-sac was cleared of all clots and debris.  The uterus was returned to the abdomen.  The pericolic gutters were cleared of all clots and debris. Areas of bleeding noted at midline of the hysterotomy, this area was imbricated using an 0 Monocryl suture. The hysterotomy was noted to be hemostatic. Surgiflo powder placed over the hysterotomy. The fascia and rectus muscles were examined and areas of oozing were controlled with  electrocautery. The fascia was closed with a running 0 Vicryl suture. The subcutaneous tissue was irrigated and areas of oozing were controlled with electrocautery. The subcutaneous tissue was closed with 2-0 Plain gut. The skin was closed with 4-0 monocryl and covered with a sterile dressing. All sponge, needle, and instrument counts were correct. The patient tolerated the procedure well. On final uterine massage, an additional 200mL of blood extruted vaginally. Fundus firm. IM methergine administered. Patient and was transferred to recovery in stable condition.     Mary Giles MD  OB/GYN  3/1/2023

## 2023-03-01 NOTE — LACTATION NOTE
"Initial lactation visit with Deborah and baby Luis. 39+0 infant whose mother GDMA1 during pregnancy. Initial glucose levels 38,41,36. Glucose gel and supplementation indicated. At time of visit, infant had just finished breastfeeding attempt and glucose gel administration. Infant uninterested in breastfeeding, so assisted with finger feeding. Coordinated SSB and he took 18ml and uninterested in more. Deborah initiated use of breastpump and colostrum visible. She shares that she  her first two children x 3 months without complications. Around this time, she felt her supply couldn't keep up, so she weaned.    Reviewed/Highlighted  breastfeeding basics:   1) Watch for early feeding cues (licking lips, stirring or rooting, sucking movement with mouth, hands to mouth) and always breast feed on DEMAND.  2) Infant should breastfeed a minimum of 8 times in 24 hours. If it has been 3 hours since last breast feeding session, un-swaddle infant and begin skin to skin to entice infant to nurse.  3) Techniques to waking a sleepy baby to nurse: (undress infant, change diaper if necessary, gently stroking bottom of feet and back, snuggling infant skin to skin, expressing colostrum).      Discussed physiology of milk production from colostrum through milk coming in and how the breasts should begin to feel \"heavy or full\" between day 3-5. Answered questions regarding \"how to know when infant is done at the breast\". Educated to infant satiety signs; encouraged listening for audible swallows along with watching for changes in infant's stool color. Discussed normal infant weight loss and when infant should be back to birth weight. Stressed the importance of continuing to track infant's feeds and void/stools patterns, at least until infant has returned to his birth weight.     Anticipate infant continuing with supplementation; encourage using the breastpump after each feeding session and could supplement with EBM/donor milk " until her milk comes in. Encourage skin to skin to promote breastfeeding outcomes.    Mariana Roque RN, IBCLC

## 2023-03-01 NOTE — H&P
[facility]  OB History and Physical      Deborah Swartz MRN# [id number]   Age: 27 year old YOB: 1995     HPI:  Ms. Deborah Swartz is a 27 year old  at 39w0d by LMP c/w 11w1d US, who presents with ROM and contractions since 3am. She has a scheduled repeat CS tomorrow AM.  She denies vaginal bleeding. Normal fetal movement. Denies HA, vision changes, SOB, chest pain, fever, chills, nausea, vomiting, constipation, diarrhea, dysuria, LE swelling.      Pregnancy Complications:  - Low lying placenta, circumvallate  - Hx CS x 2, desires sterilization  - Hx preE w/o SF    - Migraine HA   - BMI 34  - MDD  - GDMA1    Prenatal Labs:   Lab Results   Component Value Date    AS Negative 2022    HEPBANG Nonreactive 2022    CHPCRT Negative 2022    GCPCRT Negative 2022    HGB 11.7 2023       GBS Status: neg    OB History  OB History    Para Term  AB Living   4 2 2 0 1 2   SAB IAB Ectopic Multiple Live Births   1 0 0 0 2      # Outcome Date GA Lbr Jairo/2nd Weight Sex Delivery Anes PTL Lv   4 Current            3 Term 21 38w5d  3.67 kg (8 lb 1.5 oz) F CS-LTranv Spinal N ALISHA      Name: DANIA,FEMALE-DEBORAH      Apgar1: 8  Apgar5: 9   2 Term 19     CS-LVertical   ALISHA   1 SAB                PMHx:   Past Medical History:   Diagnosis Date     Asthma      Depressive disorder N/A    [facility]     [facility]     Mental disorder      Papanicolaou smear of cervix with atypical squamous cells of undetermined significance (ASC-US) 2016     Postpartum depression      PSHx:   Past Surgical History:   Procedure Laterality Date      SECTION        SECTION N/A 2021    Procedure:  SECTION;  Surgeon: Del Saini MD;  Location: [facility]+D OR;  Service: Obstetrics     Meds:   Medications Prior to Admission   Medication Sig Dispense Refill Last Dose     ASPIRIN LOW DOSE 81 MG EC tablet daily    2/28/2023     folic acid (FOLVITE) 1 MG tablet Take 1 tablet (1 mg) by mouth daily 90 tablet 3 2/28/2023     meclizine (ANTIVERT) 25 MG tablet TAKE 1 TABLET(25 MG) BY MOUTH THREE TIMES DAILY AS NEEDED FOR DIZZINESS Strength: 25 mg 30 tablet 0 2/28/2023     Prenatal Vit-Fe Sulfate-FA-DHA (PRENATAL VITAMIN/MIN +DHA PO)    2/28/2023     sertraline (ZOLOFT) 50 MG tablet Take 1 tablet (50 mg) by mouth daily (Patient taking differently: Take 100 mg by mouth daily) 30 tablet 3 3/1/2023     acetone urine (KETOSTIX) test strip Test first urine of every morning for 1 week. If consistently negative, test once weekly. 50 strip 3      blood glucose (NO BRAND SPECIFIED) lancets standard Test blood sugar 4 x/ day. Brand per insurance coverage. 200 each 1      blood glucose (NO BRAND SPECIFIED) test strip Use to test blood sugar 4 times daily or as directed. To accompany: Blood Glucose Monitor Brand per insurance. 150 strip 3      blood glucose monitoring (NO BRAND SPECIFIED) meter device kit Use to test blood sugar 4 times daily or as directed.: Blood Glucose Monitor Brand: per insurance. 1 kit 0      sertraline (ZOLOFT) 100 MG tablet Take 1 tablet (100 mg) by mouth daily 30 tablet 1      thin (NO BRAND SPECIFIED) lancets Use to test blood sugar 4 times daily or as directed. To accompany: Blood Glucose Monitor Brands: per insurance. (Patient not taking: Reported on 1/5/2023) 100 each 3      Allergies:    Allergies   Allergen Reactions     Cherry      Vomiting and Hives     Fluoxetine      Felt she was shaking inside on 20 mg dose     Latex Hives and Itching      FmHx:   Family History   Problem Relation Age of Onset     Hypertension Mother      GERD Mother      Congenital Anomalies Mother         Spinal Bifada     Anxiety Disorder Mother      Diabetes Type 2  Mother      Depression Father      Asthma Maternal Grandfather      Diabetes Type 2  Paternal Grandmother      SocHx: She denies any tobacco, alcohol, or other drug use  during this pregnancy.    ROS:   Complete 10-point ROS negative except as noted in HP.    PE:  Vit: See flow sheet   Gen: Well-appearing, NAD, uncomfortable with contractions  CV: Regular rate  Pulm: Breathing comfortably on RA  Abd: Soft, gravid, non-tender  Ext: trace LE edema b/l  Cx: 1-2cm per RN             FHT: Baseline 135bpm, moderate variability, + accelerations, no decelerations   [place]: 2-3 contractions in 10 minutes        Assessment  Ms. Deborah Swartz is a 27 year old , at 39w0d, who presents with SROM.    Plan  -  section   - Admit to L&D for scheduled RLTCS+BS.   - Obtain type and screen, CBC   - Fen/GI: NPO, IVF    - Anesthesia consult    - PNC:     - Rh positive. Rubella immune. GBS negative.    - Contraception: Bilateral salpingectomy    -GDMA1   - Fasting BG in AM     -Fetal Well Being   - Category I FHT. Reactive and reassuring      Mary Giles MD, S  3/1/2023

## 2023-03-01 NOTE — ANESTHESIA PROCEDURE NOTES
"Intrathecal Procedure Note    Pre-Procedure   Staff -        Anesthesiologist:  Sherwin Hebert MD       Performed By: anesthesiologist       Location: OR       Pre-Anesthestic Checklist: patient identified, IV checked, site marked, risks and benefits discussed, informed consent, monitors and equipment checked, pre-op evaluation and at physician/surgeon's request  Timeout:       Correct Patient: Yes        Correct Procedure: Yes        Correct Site: Yes        Correct Position: Yes   Procedure Documentation  Procedure: intrathecal       Patient Position: sitting       Skin prep: Betadine       Insertion Site: L3-4. (midline approach).       Spinal Needle Type: Ness-Blake       Introducer used       Introducer: 20 G       # of attempts: 1 and  # of redirects:     Assessment/Narrative         Paresthesias: No.       CSF fluid: clear.       Opening pressure was cmH2O while  Sitting.       Comments:  Patient sitting on edge of OR bed, lower back cleaned and prepped in sterile fashion with betadine. 1% lido used to numb area. Introducer placed, spinal needle through introducer. Appropriate flow of CSF and confirmed with aspiration via syringe. Spinal dose given, 12 mg 0.75% bupivacaine with 200 mcg Duramorph. No complications.       FOR [facility] ([facility]) ONLY:   Pain Team Contact information: please page the Pain Team Via [employer]. Search \"Pain\". During daytime hours, please page the attending first. At night please page the resident first.    "

## 2023-03-01 NOTE — PROVIDER NOTIFICATION
Notified on-call Dr. Jany Mejias MD regarding elevated BP.     MD ordered AST, ALT, BMP, and CBC to be collected now.

## 2023-03-01 NOTE — PLAN OF CARE
Pt presented to [facility] at 0452, states her water broke around 0330 and contractions have started getting stronger in the last half hour. External monitors applied with verbal consent. SVE 1/30/-3, pt grossly ruptured with mec stained fluid. Pt is a repeat c/s scheduled tomorrow. Dr. Giles updated and plans to come in to do c/section. Pt updated on plan of care and preped for surgery. Report given to Senait Johnson RN at 0600 to assume cares.

## 2023-03-01 NOTE — ANESTHESIA POSTPROCEDURE EVALUATION
Patient: Deborah Swartz    Procedure: Procedure(s):  Repeat  Section with Bilateral Salpingectomy       Anesthesia Type:  Spinal    Note:     Postop Pain Control: Uneventful            Sign Out: Well controlled pain   PONV: No   Neuro/Psych: Uneventful            Sign Out: Acceptable/Baseline neuro status   Airway/Respiratory: Uneventful            Sign Out: Acceptable/Baseline resp. status   CV/Hemodynamics: Uneventful            Sign Out: Acceptable CV status   Other NRE: NONE   DID A NON-ROUTINE EVENT OCCUR?            Last vitals:  Vitals Value Taken Time   /74 2333   Temp 36.7  C (98  F) 23   Pulse 60 23   Resp     SpO2 99 % 23       Electronically Signed By: Sherwin Hebert MD  2023  1:07 PM

## 2023-03-01 NOTE — PLAN OF CARE
Data: Deborah Swartz transferred to 431 via OR cart. Baby transferred via in mothers arms  Action: Receiving unit notified of transfer: Yes. Patient and family notified of room change. Report given to RN at 0945. Belongings sent to receiving unit. Accompanied by Registered Nurse. Oriented patient to surroundings. Call light within reach. ID bands double-checked with receiving RN.  Response: Patient tolerated transfer and is stable.

## 2023-03-01 NOTE — ANESTHESIA PREPROCEDURE EVALUATION
Anesthesia Pre-Procedure Evaluation    Patient: Deborah Swartz   MRN: [id number] : 1995        Procedure : Procedure(s):  Repeat  Section with Bilateral Salpingectomy          Past Medical History:   Diagnosis Date     Asthma      Depressive disorder N/A    [facility]     [facility] ([facility]     [facility]     Mental disorder      Papanicolaou smear of cervix with atypical squamous cells of undetermined significance (ASC-US) 2016     Postpartum depression       Past Surgical History:   Procedure Laterality Date      SECTION        SECTION N/A 2021    Procedure:  SECTION;  Surgeon: Del Saini MD;  Location: [facility] L+D OR;  Service: Obstetrics      Allergies   Allergen Reactions     Cherry      Vomiting and Hives     Fluoxetine      Felt she was shaking inside on 20 mg dose     Latex Hives and Itching      Social History     Tobacco Use     Smoking status: Former     Packs/day: 0.50     Years: 1.00     Pack years: 0.50     Types: Cigarettes     Start date: 2013     Quit date: 2020     Years since quittin.5     Smokeless tobacco: Former     Tobacco comments:     Smoked for 5years, quit cold turkey for both pregnancies, picked it up again upon returning to work.   Substance Use Topics     Alcohol use: Not Currently     Comment: none      Wt Readings from Last 1 Encounters:   23 96.4 kg (212 lb 9.6 oz)        Anesthesia Evaluation   Pt has had prior anesthetic.     No history of anesthetic complications       ROS/MED HX  ENT/Pulmonary:     (+) tobacco use, Past use, asthma  (-) sleep apnea   Neurologic: Comment: Generalized muscle weakness    (+) migraines,  (-) no seizures and no CVA   Cardiovascular:     (+) -----Previous cardiac testing   Echo: Date: Results:    Stress Test: Date: Results:    ECG Reviewed: Date: 23 Results:  NSR  Cath: Date: Results:   (-) hypertension   METS/Exercise Tolerance:     Hematologic:       Musculoskeletal:        GI/Hepatic:     (+) GERD,  (-) liver disease   Renal/Genitourinary:    (-) renal disease   Endo:     (+) Obesity, gestational diabetes, (-) Type II DM and thyroid disease   Psychiatric/Substance Use:     (+) psychiatric history     Infectious Disease:       Malignancy:       Other:            Physical Exam    Airway        Mallampati: II   TM distance: > 3 FB   Neck ROM: full   Mouth opening: > 3 cm    Respiratory Devices and Support         Dental       (+) Minor Abnormalities - some fillings, tiny chips      Cardiovascular   cardiovascular exam normal          Pulmonary   pulmonary exam normal                OUTSIDE LABS:  CBC:   Lab Results   Component Value Date    WBC 10.4 01/26/2023    WBC 8.2 12/23/2022    HGB 11.7 01/26/2023    HGB 11.7 12/23/2022    HCT 35.7 01/26/2023    HCT 37.4 12/23/2022     (L) 01/26/2023     (L) 12/23/2022     BMP:   Lab Results   Component Value Date     06/07/2022     04/20/2022    POTASSIUM 4.1 06/07/2022    POTASSIUM 4.2 04/20/2022    CHLORIDE 105 06/07/2022    CHLORIDE 107 04/20/2022    CO2 26 06/07/2022    CO2 23 04/20/2022    BUN 11 06/07/2022    BUN 9 04/20/2022    CR 0.47 (L) 01/26/2023    CR 0.43 (L) 09/26/2022     (H) 06/07/2022    GLC 89 04/20/2022     COAGS:   Lab Results   Component Value Date    PTT 29 02/11/2021    INR 0.95 02/11/2021     POC:   Lab Results   Component Value Date    HCG Negative 04/22/2022    HCGS Positive (A) 08/09/2022     HEPATIC:   Lab Results   Component Value Date    ALBUMIN 3.9 04/20/2022    PROTTOTAL 7.7 04/20/2022    ALT 10 01/26/2023    AST 17 01/26/2023    ALKPHOS 88 04/20/2022    BILITOTAL 0.5 04/20/2022     OTHER:   Lab Results   Component Value Date    A1C 5.6 09/26/2022    LEXIE 9.2 06/07/2022    TSH 0.62 06/07/2022    SED 36 (H) 11/26/2022       Anesthesia Plan    ASA Status:  3   NPO Status:  NPO Appropriate    Anesthesia Type: Spinal.              Consents    Anesthesia Plan(s) and associated risks,  Prior to Admission medications    Medication Sig Start Date End Date Taking? Authorizing Provider   lisinopril (PRINIVIL;ZESTRIL) 10 MG tablet Take 1 tablet by mouth daily 7/6/18  Yes Radha Locke MD   nitroGLYCERIN (NITROSTAT) 0.4 MG SL tablet Place 1 tablet under the tongue every 5 minutes as needed for Chest pain 6/10/18  Yes Wolf Siddiqi MD   metoprolol succinate (TOPROL XL) 50 MG extended release tablet Take 1 tablet by mouth daily 6/10/18  Yes James Preethis, APRN - CNP   aspirin 81 MG chewable tablet Take 1 tablet by mouth daily 6/10/18  Yes James Preethis, APRN - CNP   atorvastatin (LIPITOR) 40 MG tablet Take 1 tablet by mouth nightly 6/9/18  Yes James OrosPEYMAN - CNP   clopidogrel (PLAVIX) 75 MG tablet Take 1 tablet by mouth daily 6/10/18  Yes James Preethis, APRN - CNP   furosemide (LASIX) 20 MG tablet Take 1 tablet by mouth 2 times daily 4/26/18  Yes Radha Locke MD   esomeprazole Magnesium (NEXIUM) 20 MG PACK Take 20 mg by mouth daily Pt takes every other day   Yes Historical Provider, MD          REVIEW OF SYSTEMS:      All 14-point review of systems are completed and  pertinent positives are mentioned in the history of present illness. Other  systems are reviewed and are negative. Physical Examination:    Ht 6' 1\" (1.854 m)   Wt 180 lb (81.6 kg)   BMI 23.75 kg/m²       Constitutional and General Appearance:    alert, cooperative, no distress and appears stated age  [de-identified]:    PERRLA, no cervical lymphadenopathy. No masses palpable.  Normal oral  mucosa  Respiratory:  · Normal excursion and expansion without use of accessory muscles  · Resp Auscultation: Normal breath sounds without dullness or wheezing  Cardiovascular:  · The apical impulse is not displaced  · Heart tones are crisp and normal. regular S1 and S2.  · Jugular venous pulsation Normal  · The carotid upstroke is normal in amplitude and contour without delay or bruit  · Peripheral pulses are symmetrical benefits, and realistic alternatives discussed. Questions answered and patient/representative(s) expressed understanding.     - Discussed: Risks, Benefits and Alternatives for the PROCEDURE were discussed     - Discussed with:  Patient, Spouse         Postoperative Care    Pain management: Multi-modal analgesia.   PONV prophylaxis: Ondansetron (or other 5HT-3)     Comments:                Anupam Wallace MD   [facility]  [facility].   [place], [address]  Phone: [phone]  Fax: [phone]

## 2023-03-02 LAB
ALT SERPL W P-5'-P-CCNC: 28 U/L (ref 10–35)
AST SERPL W P-5'-P-CCNC: 41 U/L (ref 10–35)
GLUCOSE SERPL-MCNC: 82 MG/DL (ref 70–99)
HGB BLD-MCNC: 10.6 G/DL (ref 11.7–15.7)
PLATELET # BLD AUTO: 79 10E3/UL (ref 150–450)

## 2023-03-02 PROCEDURE — 250N000013 HC RX MED GY IP 250 OP 250 PS 637: Performed by: STUDENT IN AN ORGANIZED HEALTH CARE EDUCATION/TRAINING PROGRAM

## 2023-03-02 PROCEDURE — 93005 ELECTROCARDIOGRAM TRACING: CPT

## 2023-03-02 PROCEDURE — 85018 HEMOGLOBIN: CPT | Performed by: STUDENT IN AN ORGANIZED HEALTH CARE EDUCATION/TRAINING PROGRAM

## 2023-03-02 PROCEDURE — 84460 ALANINE AMINO (ALT) (SGPT): CPT | Performed by: OBSTETRICS & GYNECOLOGY

## 2023-03-02 PROCEDURE — 84450 TRANSFERASE (AST) (SGOT): CPT | Performed by: OBSTETRICS & GYNECOLOGY

## 2023-03-02 PROCEDURE — 36415 COLL VENOUS BLD VENIPUNCTURE: CPT | Performed by: STUDENT IN AN ORGANIZED HEALTH CARE EDUCATION/TRAINING PROGRAM

## 2023-03-02 PROCEDURE — 93010 ELECTROCARDIOGRAM REPORT: CPT | Performed by: INTERNAL MEDICINE

## 2023-03-02 PROCEDURE — 82947 ASSAY GLUCOSE BLOOD QUANT: CPT | Performed by: STUDENT IN AN ORGANIZED HEALTH CARE EDUCATION/TRAINING PROGRAM

## 2023-03-02 PROCEDURE — 120N000012 HC R&B POSTPARTUM

## 2023-03-02 PROCEDURE — 85049 AUTOMATED PLATELET COUNT: CPT | Performed by: STUDENT IN AN ORGANIZED HEALTH CARE EDUCATION/TRAINING PROGRAM

## 2023-03-02 RX ORDER — LIDOCAINE 4 G/G
1 PATCH TOPICAL
Status: DISCONTINUED | OUTPATIENT
Start: 2023-03-02 | End: 2023-03-04 | Stop reason: HOSPADM

## 2023-03-02 RX ADMIN — SIMETHICONE 80 MG: 80 TABLET, CHEWABLE ORAL at 21:28

## 2023-03-02 RX ADMIN — ACETAMINOPHEN 975 MG: 325 TABLET, FILM COATED ORAL at 11:44

## 2023-03-02 RX ADMIN — LIDOCAINE 1 PATCH: 560 PATCH PERCUTANEOUS; TOPICAL; TRANSDERMAL at 11:06

## 2023-03-02 RX ADMIN — ACETAMINOPHEN 975 MG: 325 TABLET, FILM COATED ORAL at 19:07

## 2023-03-02 RX ADMIN — OXYCODONE HYDROCHLORIDE 5 MG: 5 TABLET ORAL at 14:31

## 2023-03-02 RX ADMIN — ACETAMINOPHEN 975 MG: 325 TABLET, FILM COATED ORAL at 06:16

## 2023-03-02 RX ADMIN — OXYCODONE HYDROCHLORIDE 5 MG: 5 TABLET ORAL at 05:08

## 2023-03-02 RX ADMIN — SERTRALINE HYDROCHLORIDE 100 MG: 100 TABLET ORAL at 07:19

## 2023-03-02 RX ADMIN — OXYCODONE HYDROCHLORIDE 5 MG: 5 TABLET ORAL at 09:21

## 2023-03-02 RX ADMIN — OXYCODONE HYDROCHLORIDE 5 MG: 5 TABLET ORAL at 23:27

## 2023-03-02 RX ADMIN — SENNOSIDES AND DOCUSATE SODIUM 1 TABLET: 50; 8.6 TABLET ORAL at 07:19

## 2023-03-02 RX ADMIN — ACETAMINOPHEN 975 MG: 325 TABLET, FILM COATED ORAL at 00:06

## 2023-03-02 RX ADMIN — SENNOSIDES AND DOCUSATE SODIUM 1 TABLET: 50; 8.6 TABLET ORAL at 20:36

## 2023-03-02 ASSESSMENT — ACTIVITIES OF DAILY LIVING (ADL)
ADLS_ACUITY_SCORE: 23

## 2023-03-02 NOTE — PLAN OF CARE
VSS on RA, BP stable at 120s/80s. Denies N/V, pre-e symptoms. Up SBA/independent to bathroom, voiding well. Sharp L incisional pain when ambulating, steady on feet. Taking tylenol for pain, oxy given x1 this shift. No NSAIDs while Plt low at 77, due for recheck in AM. Also getting Hgb, AST/ALT, BG in AM. Breastfeeding well with minimal assistance with positioning/latch. Postpartum assessment WDL, incision site CDI with pressure dressing. Fundus firm U/U, scant bleeding, one clot during shift, small. L PIV SL. Bonding well with baby.  Jonathan at bedside. Will continue with POC.   None

## 2023-03-02 NOTE — PROVIDER NOTIFICATION
03/01/23 2300   Provider Notification   Provider Name/Title Dr Jany Mejias   Method of Notification Phone   Request Evaluate-Remote   Notification Reason Lab Results       AST 42, ALT 25, Plt 77. BP stable at 124/82. Orders for AM AST/ALT and platelets.

## 2023-03-02 NOTE — PLAN OF CARE
VSS on RA except BP's in 140s/90s. MD notified and ordered labs, see previous note. Labs collected now and results pending. SBA to BR. Bonding well with baby. Breast feeding and pumping after. Tolerating regular diet. Bear removed at 1740, DTV. Incision dressing CDI. Fundus firm.

## 2023-03-02 NOTE — PLAN OF CARE
VSS on RA. Using Tylenol and oxycodone for pain. MD added lidocaine patch today and on abdomen. Using ice on incision and heat packs on abdomen for cramping as well. Now comfortable ambulating independently in room, but SBA in halls. Bonding well with baby. Voiding spontaneously. No BM this shift. IV removed. Holding NSAID's dt low platelet count, recheck ordered for tomorrow morning. Incision dressing removed. Closed with skin glue and steri strips. Likely discharge back to home tomorrow if medically stable.

## 2023-03-02 NOTE — PROGRESS NOTES
Post Partum Progress Note    Subjective:  Patient is doing well.  Does have abdominal pain. Moderate lochia. Tolerating PO and ambulating without any issues. Voiding s/p villavicencio. Passing flatus.  Denies any fever, chills, SOB, chest pain, N/V,  Headache. Baseline dizziness. Planning on breast feeding.      Objective:  Patient Vitals for the past 24 hrs:   BP Temp Temp src Pulse Resp SpO2   23 0735 128/81 98.5  F (36.9  C) Oral 70 16 --   23 0400 122/79 98.3  F (36.8  C) Oral 78 16 99 %   23 0000 132/79 98.3  F (36.8  C) Oral 76 16 99 %   23 1958 124/82 97.8  F (36.6  C) Oral 61 16 99 %   23 1616 (!) 142/93 -- -- -- -- 100 %   23 1515 (!) 146/93 -- -- 51 -- --   23 1300 (!) 134/94 98.4  F (36.9  C) Oral 68 16 99 %   23 1213 134/77 98.2  F (36.8  C) Oral 77 16 99 %   23 1101 131/88 -- -- 64 16 99 %   23 1055 -- -- -- -- 16 99 %   23 1016 138/86 98.6  F (37  C) Oral 59 16 99 %   23 0958 (!) 137/90 -- -- 63 16 98 %   23 0933 (!) 142/74 98  F (36.7  C) Oral 60 -- 99 %   23 0915 (!) 139/90 -- -- 62 -- 98 %   23 0900 (!) 128/93 -- -- 78 -- 97 %   23 0848 114/79 -- -- 62 -- 97 %       General: NAD, appears generally well  CV:  Regular rate  Resp:  Breathing comfortably on RA  Abd:  Soft, nontender, nondistended, fundus firm at approximately 2 cm below umbilicus, incision c/d/i with overlying steri strips   Ext:  trace edema in bilateral LE    Assessment and Plan:  27 year old old  post-partum day 1 s/p RLTCS+BS.  AFVSS.  Doing well without any complaints.    gHTN  - Elevated BP postpartum   - Gestational thrombocytopenia, stable   - Continue to monitor labs for signs of preE. No symptoms of preE.     Dizziness, irregular rhythm  - Auscultated on RN exam. Will obtain EKG. Normal EKG in December. Denies changes in sx of dizziness. No palpitations.     Routine care  -Hgb 12.5 > Delivery QBL (mL): 660+200mL on fundal check>  10.6. Asymptomatic ABLA.   -Pain: NSAIDs held due to thrombocytopenia. Restart in AM if plt improving. Lidocaine patch ordered.   -Rh positive; no rhogam needed  -Rubella immune; no MMR needed  -Breast feeding  -Plan to DC PPD#3      Mary Giles MD, S  03/02/23

## 2023-03-02 NOTE — LACTATION NOTE
This note was copied from a baby's chart.  Lactation visit with Mee and baby boy.     Mom GDDC, infant intial blood sugars are done. Infant had been supplementing with DBM/EBM tube/syringe at the breast. 24 hour blood sugar was 81. Discussed weaning from supplementing, LC able to watch a breast feeding. Infant fairly sleepy until LC changed his diaper, once infant wakeful, he latched and began suckling in football hold on R breast. Infant latched well and began nutritive suckling pattern, audible swallows. Infant unlatched himself after about 10-15 minutes and then offered L breast. Infant latched immediately on L and began nutritive pattern on L side.    Since infant breastfeeding so well and blood sugar was stable, suggested Mee can trial just offering the breast (forego supplementation). This will help determine if infant continues to need supplementation. Discussed what to watch for, signs infant's blood sugar has dropped (sleepiness, not waking for feedings at all and jitteriness).    If infant is sustaining blood sugars w/o supplementation then Mee could stop pumping after feedings. If supplementation becomes necessary again, then  suggests Mee start pumping after feedings again.    Mee mentioned 24mm flange was uncomfortable and she felt she needed a smaller size.  provides Medela 21mm flange for trial.      Will revisit as requested. Appreciative of visit.    Celi iVlla RN, IBCLC

## 2023-03-02 NOTE — PROVIDER NOTIFICATION
Upon assessment, auscultated irregular heart rate. VSS, however, still complaining of vague dizziness. Spoke with Dr. Giles in person to discuss findings, and she will order EKG     ADDENDUM:     EKG normal sinus rhythm

## 2023-03-03 LAB
PATH REPORT.COMMENTS IMP SPEC: NORMAL
PATH REPORT.COMMENTS IMP SPEC: NORMAL
PATH REPORT.FINAL DX SPEC: NORMAL
PATH REPORT.GROSS SPEC: NORMAL
PATH REPORT.MICROSCOPIC SPEC OTHER STN: NORMAL
PATH REPORT.RELEVANT HX SPEC: NORMAL
PHOTO IMAGE: NORMAL
PLATELET # BLD AUTO: 114 10E3/UL (ref 150–450)

## 2023-03-03 PROCEDURE — 36415 COLL VENOUS BLD VENIPUNCTURE: CPT | Performed by: STUDENT IN AN ORGANIZED HEALTH CARE EDUCATION/TRAINING PROGRAM

## 2023-03-03 PROCEDURE — 120N000012 HC R&B POSTPARTUM

## 2023-03-03 PROCEDURE — 85049 AUTOMATED PLATELET COUNT: CPT | Performed by: STUDENT IN AN ORGANIZED HEALTH CARE EDUCATION/TRAINING PROGRAM

## 2023-03-03 PROCEDURE — 250N000013 HC RX MED GY IP 250 OP 250 PS 637: Performed by: STUDENT IN AN ORGANIZED HEALTH CARE EDUCATION/TRAINING PROGRAM

## 2023-03-03 RX ADMIN — SERTRALINE HYDROCHLORIDE 100 MG: 100 TABLET ORAL at 06:36

## 2023-03-03 RX ADMIN — LIDOCAINE 1 PATCH: 560 PATCH PERCUTANEOUS; TOPICAL; TRANSDERMAL at 11:26

## 2023-03-03 RX ADMIN — SENNOSIDES AND DOCUSATE SODIUM 1 TABLET: 50; 8.6 TABLET ORAL at 21:24

## 2023-03-03 RX ADMIN — SIMETHICONE 80 MG: 80 TABLET, CHEWABLE ORAL at 11:29

## 2023-03-03 RX ADMIN — ACETAMINOPHEN 975 MG: 325 TABLET, FILM COATED ORAL at 01:00

## 2023-03-03 RX ADMIN — SIMETHICONE 80 MG: 80 TABLET, CHEWABLE ORAL at 03:50

## 2023-03-03 RX ADMIN — ACETAMINOPHEN 975 MG: 325 TABLET, FILM COATED ORAL at 18:48

## 2023-03-03 RX ADMIN — ACETAMINOPHEN 975 MG: 325 TABLET, FILM COATED ORAL at 13:42

## 2023-03-03 RX ADMIN — ACETAMINOPHEN 975 MG: 325 TABLET, FILM COATED ORAL at 06:34

## 2023-03-03 RX ADMIN — SENNOSIDES AND DOCUSATE SODIUM 2 TABLET: 50; 8.6 TABLET ORAL at 08:25

## 2023-03-03 RX ADMIN — IBUPROFEN 800 MG: 400 TABLET ORAL at 11:26

## 2023-03-03 ASSESSMENT — ACTIVITIES OF DAILY LIVING (ADL)
ADLS_ACUITY_SCORE: 23

## 2023-03-03 NOTE — PROGRESS NOTES
Post Partum Progress Note    Subjective:  Patient is doing well. Feels fatigued. Pain improved. Minimal lochia. Tolerating PO and ambulating without any issues. Voiding s/p villavicencio. Passing flatus.  Denies any fever, chills, SOB, chest pain, N/V,  Headache.Planning on breast feeding.      Objective:  Patient Vitals for the past 24 hrs:   BP Temp Temp src Pulse Resp SpO2   23 0734 122/76 97.8  F (36.6  C) Oral 75 18 97 %   23 2300 134/84 98.4  F (36.9  C) Oral 85 16 --   23 1439 120/80 98.4  F (36.9  C) Oral 68 16 --       General: NAD, appears generally well  CV:  Regular rate  Resp:  Breathing comfortably on RA  Abd:  Soft, nontender, nondistended, fundus firm at approximately 2 cm below umbilicus, incision c/d/i with overlying steri strips   Ext:  trace edema in bilateral LE    Assessment and Plan:  27 year old old  post-partum day 2 s/p RLTCS+BS.  AFVSS.  Doing well without any complaints.    gHTN  - Elevated BP postpartum   - Gestational thrombocytopenia, improved. NSAIDs restarted. Labs as clinically indicated.   - Continue to monitor labs for signs of preE. No symptoms of preE.      Routine care  -Hgb 12.5 > Delivery QBL (mL): 660+200mL on fundal check> 10.6. Asymptomatic ABLA.   -Pain: NSAIDs resumed. Lidocaine patch ordered.   -Rh positive; no rhogam needed  -Rubella immune; no MMR needed  -Breast feeding  -Plan to DC PPD#3    Mary Giles MD, S  23

## 2023-03-03 NOTE — PLAN OF CARE
VSS on RA. Denies N/V, pre-e symptoms. Up independently, voiding well, no BM yet. Taking tylenol and oxycodone for pain, no NSAIDs until Plt count improves. Lidocaine patch off. Using ice on incision site and heat packs on abdomen for cramping. Postpartum assessment WDL, incision site CDI, steri strips in place. Fundus firm U/U, scant bleeding, no clots. Taking simethicone for gas pain with good effect. Showered this shift. Breastfeeding on cue with minimal assist with positioning/latch, supplementing with HDM as needed. Baby cluster feeding this shift. Bonding well with infant.  Jonathan at bedside. Plan to discharge in AM. Will continue with POC.

## 2023-03-04 VITALS
TEMPERATURE: 97.5 F | HEART RATE: 85 BPM | OXYGEN SATURATION: 99 % | DIASTOLIC BLOOD PRESSURE: 90 MMHG | RESPIRATION RATE: 16 BRPM | SYSTOLIC BLOOD PRESSURE: 144 MMHG

## 2023-03-04 LAB — PLATELET # BLD AUTO: 153 10E3/UL (ref 150–450)

## 2023-03-04 PROCEDURE — 85049 AUTOMATED PLATELET COUNT: CPT | Performed by: STUDENT IN AN ORGANIZED HEALTH CARE EDUCATION/TRAINING PROGRAM

## 2023-03-04 PROCEDURE — 250N000013 HC RX MED GY IP 250 OP 250 PS 637: Performed by: STUDENT IN AN ORGANIZED HEALTH CARE EDUCATION/TRAINING PROGRAM

## 2023-03-04 PROCEDURE — 36415 COLL VENOUS BLD VENIPUNCTURE: CPT | Performed by: STUDENT IN AN ORGANIZED HEALTH CARE EDUCATION/TRAINING PROGRAM

## 2023-03-04 RX ORDER — ACETAMINOPHEN 325 MG/1
650 TABLET ORAL EVERY 4 HOURS PRN
COMMUNITY
Start: 2023-03-04 | End: 2023-09-12

## 2023-03-04 RX ORDER — LIDOCAINE 4 G/G
1 PATCH TOPICAL EVERY 24 HOURS
COMMUNITY
Start: 2023-03-04 | End: 2023-09-12

## 2023-03-04 RX ORDER — OXYCODONE HYDROCHLORIDE 5 MG/1
5 TABLET ORAL EVERY 4 HOURS PRN
Qty: 10 TABLET | Refills: 0 | Status: SHIPPED | OUTPATIENT
Start: 2023-03-04 | End: 2023-03-07

## 2023-03-04 RX ORDER — IBUPROFEN 800 MG/1
800 TABLET, FILM COATED ORAL EVERY 6 HOURS PRN
COMMUNITY
Start: 2023-03-04 | End: 2023-09-12

## 2023-03-04 RX ORDER — AMOXICILLIN 250 MG
1 CAPSULE ORAL 2 TIMES DAILY
COMMUNITY
Start: 2023-03-04 | End: 2023-09-12

## 2023-03-04 RX ADMIN — SENNOSIDES AND DOCUSATE SODIUM 1 TABLET: 50; 8.6 TABLET ORAL at 07:17

## 2023-03-04 RX ADMIN — SERTRALINE HYDROCHLORIDE 100 MG: 100 TABLET ORAL at 06:02

## 2023-03-04 RX ADMIN — ACETAMINOPHEN 650 MG: 325 TABLET ORAL at 07:17

## 2023-03-04 RX ADMIN — ACETAMINOPHEN 975 MG: 325 TABLET, FILM COATED ORAL at 00:46

## 2023-03-04 RX ADMIN — IBUPROFEN 800 MG: 400 TABLET ORAL at 02:57

## 2023-03-04 RX ADMIN — SIMETHICONE 80 MG: 80 TABLET, CHEWABLE ORAL at 00:46

## 2023-03-04 ASSESSMENT — ACTIVITIES OF DAILY LIVING (ADL)
ADLS_ACUITY_SCORE: 23
ADLS_ACUITY_SCORE: 23
ADLS_ACUITY_SCORE: 22
ADLS_ACUITY_SCORE: 23
ADLS_ACUITY_SCORE: 22
ADLS_ACUITY_SCORE: 22

## 2023-03-04 NOTE — PLAN OF CARE
Vital signs stable. Postpartum assessment WDL. Incision with steri strips CDI. Pain controlled with Tylenol, Ibuprofen, Lidocaine patch for incisional pain, and heat to abdomen for cramping. Patient ambulating independently. Patient reports passing gas and had BM on shift. Breastfeeding on cue without assist. Patient and infant bonding well. Will continue with current plan of care. Platelet draw scheduled for this AM.

## 2023-03-04 NOTE — DISCHARGE SUMMARY
DELIVERY DISCHARGE SUMMARY    Admit date: 3/1/2023  Discharge date: 23  Discharge physician: Dr. Giles    Admit Dx:   - 27 year old year old  @ 39w0d GA  - Low lying placenta, circumvallate  - Hx CS x 2, desires sterilization  - Hx preE w/o SF    - Migraine HA   - BMI 34  - MDD  - GDMA1  - SROM    Discharge Dx:  - Same as above, s/p RLTCS+BS  - gHTN  - Gestational thrombocytopenia     Procedures:  - Spinal anesthesia  - RLTCS via Pfannenstiel Incision and bilateral salpingectomy    Hospital course:  Deborah Swartz was admitted as a  at 39w0d for SROM and unscheduled repeat CS.   Please see her admission H&P, operative note, and Delivery Summary for full details regarding her admission and delivery.    Her postoperative course was complicated by gHTN and gestational thrombocytopenia. On POD#3, she was meeting all of her postpartum goals and deemed stable for discharge. She was voiding without difficulty, tolerating a regular diet without nausea and vomiting, her pain was well controlled on oral pain medicines and her lochia was appropriate. Her hemoglobin prior to delivery was 12.5 and after delivery was 10.6. Her Rh status was positive and RHOgam wasnot indicated. At the time of discharge, she was breast feeding her infant.    Discharge/Disposition:  Deborah Swartz was discharged to home in stable condition with the following instructions/medications:  1) Call for temperature > 100.4, foul smelling vaginal discharge, bleeding > 1 pad per hour x 2 hrs, pain not controlled by oral pain meds, severe constipation or severe nausea or vomiting.  2) She was instructed to follow-up with her primary OB in 6 weeks for a routine postpartum visit and in one week for BP check   3) She was discharged home with the following medications:      Review of your medicines      START taking      Dose / Directions   acetaminophen 325 MG tablet  Commonly known as: TYLENOL      Dose: 650 mg  Take 2 tablets (650  mg) by mouth every 4 hours as needed for other (multimodal surgical pain management along with NSAIDS and opioid medication as indicated based on pain control and physical function)  Refills: 0     ibuprofen 800 MG tablet  Commonly known as: ADVIL/MOTRIN      Dose: 800 mg  Take 1 tablet (800 mg) by mouth every 6 hours as needed for other (cramping)  Refills: 0     Lidocaine 4 % Patch  Commonly known as: LIDOCARE      Dose: 1 patch  Place 1 patch onto the skin every 24 hours To prevent lidocaine toxicity, patient should be patch free for 12 hrs daily.  Refills: 0     oxyCODONE 5 MG tablet  Commonly known as: ROXICODONE      Dose: 5 mg  Take 1 tablet (5 mg) by mouth every 4 hours as needed for severe pain (7-10)  Quantity: 10 tablet  Refills: 0     senna-docusate 8.6-50 MG tablet  Commonly known as: SENOKOT-S/PERICOLACE      Dose: 1 tablet  Take 1 tablet by mouth 2 times daily  Refills: 0        CONTINUE these medicines which may have CHANGED, or have new prescriptions. If we are uncertain of the size of tablets/capsules you have at home, strength may be listed as something that might have changed.      Dose / Directions   sertraline 100 MG tablet  Commonly known as: ZOLOFT  This may have changed: Another medication with the same name was removed. Continue taking this medication, and follow the directions you see here.  Used for: Moderate recurrent major depression (H)      Dose: 100 mg  Take 1 tablet (100 mg) by mouth daily  Quantity: 30 tablet  Refills: 1        CONTINUE these medicines which have NOT CHANGED      Dose / Directions   acetone urine test strip  Commonly known as: KETOSTIX  Used for: Diet controlled gestational diabetes mellitus (GDM) in third trimester      Test first urine of every morning for 1 week. If consistently negative, test once weekly.  Quantity: 50 strip  Refills: 3     blood glucose lancets standard  Commonly known as: NO BRAND SPECIFIED  Used for: Diet controlled gestational diabetes  mellitus (GDM) in third trimester      Test blood sugar 4 x/ day. Brand per insurance coverage.  Quantity: 200 each  Refills: 1     blood glucose monitoring meter device kit  Commonly known as: NO BRAND SPECIFIED  Used for: Diet controlled gestational diabetes mellitus (GDM) in third trimester      Use to test blood sugar 4 times daily or as directed.: Blood Glucose Monitor Brand: per insurance.  Quantity: 1 kit  Refills: 0     blood glucose test strip  Commonly known as: NO BRAND SPECIFIED  Used for: Diet controlled gestational diabetes mellitus (GDM) in third trimester      Use to test blood sugar 4 times daily or as directed. To accompany: Blood Glucose Monitor Brand per insurance.  Quantity: 150 strip  Refills: 3     meclizine 25 MG tablet  Commonly known as: ANTIVERT  Used for: Dizziness      TAKE 1 TABLET(25 MG) BY MOUTH THREE TIMES DAILY AS NEEDED FOR DIZZINESS Strength: 25 mg  Quantity: 30 tablet  Refills: 0     PRENATAL VITAMIN/MIN +DHA PO      Refills: 0     thin lancets  Commonly known as: NO BRAND SPECIFIED  Used for: Diet controlled gestational diabetes mellitus (GDM) in third trimester      Use to test blood sugar 4 times daily or as directed. To accompany: Blood Glucose Monitor Brands: per insurance.  Quantity: 100 each  Refills: 3        STOP taking    Aspirin Low Dose 81 MG EC tablet  Generic drug: aspirin        folic acid 1 MG tablet  Commonly known as: FOLVITE              Where to get your medicines      These medications were sent to [facility] [address] [address]  [address] [address]    Phone: [phone]     oxyCODONE 5 MG tablet     Some of these will need a paper prescription and others can be bought over the counter. Ask your nurse if you have questions.    You don't need a prescription for these medications    acetaminophen 325 MG tablet    ibuprofen 800 MG tablet    Lidocaine 4 % Patch    senna-docusate 8.6-50 MG tablet        Mary Giles MD, S  03/04/23

## 2023-03-04 NOTE — PLAN OF CARE
Vital signs stable. Postpartum assessment WDL. Incision WDL, DENNY. Pain controlled with APAP/ibuprofen/lidocaine patch. Patient ambulating Ind. Patient reports passing gas. Breastfeeding on cue with no assist, supplemented X 1. Patient and infant bonding well. Will continue with current plan of care.

## 2023-03-04 NOTE — PROGRESS NOTES
Post Partum Progress Note    Subjective:  Patient is doing well. Improving. Pain improved. Minimal lochia. Tolerating PO and ambulating without any issues. Voiding, s/p BM. Denies any fever, chills, SOB, chest pain, N/V, headache. Planning on breast feeding.      Objective:  Patient Vitals for the past 24 hrs:   BP Temp Temp src Pulse Resp SpO2   23 0730 (!) 144/90 97.5  F (36.4  C) Oral 85 16 --   23 2333 126/78 97.4  F (36.3  C) Oral 79 16 --   23 1500 129/79 98  F (36.7  C) Oral 81 18 99 %       General: NAD, appears generally well  CV:  Regular rate  Resp:  Breathing comfortably on RA  Abd:  Soft, nontender, nondistended, fundus firm at approximately 2 cm below umbilicus, incision c/d/i with overlying steri strips   Ext:  trace edema in bilateral LE    Assessment and Plan:  27 year old old  post-partum day 3 s/p RLTCS+BS.  AFVSS.  Doing well without any complaints.    gHTN  - Elevated BP postpartum   - Gestational thrombocytopenia, improved. NSAIDs restarted. Labs as clinically indicated.   - No symptoms of preE.   - Home with BP cuff and BP check next week      Routine care  -Hgb 12.5 > Delivery QBL (mL): 660+200mL on fundal check> 10.6. Asymptomatic ABLA.   -Pain: NSAIDs resumed. Lidocaine patch ordered.   -Rh positive; no rhogam needed  -Rubella immune; no MMR needed  -Breast feeding  -Plan to DC today     Mary Giles MD, S  23

## 2023-03-04 NOTE — PLAN OF CARE
Pt discharged home in stable condition. Pt given discharge instructions and paperwork. Pt states understanding and denies further questions.

## 2023-03-06 LAB
ATRIAL RATE - MUSE: 65 BPM
DIASTOLIC BLOOD PRESSURE - MUSE: NORMAL MMHG
INTERPRETATION ECG - MUSE: NORMAL
P AXIS - MUSE: 49 DEGREES
PR INTERVAL - MUSE: 148 MS
QRS DURATION - MUSE: 80 MS
QT - MUSE: 394 MS
QTC - MUSE: 409 MS
R AXIS - MUSE: 56 DEGREES
SYSTOLIC BLOOD PRESSURE - MUSE: NORMAL MMHG
T AXIS - MUSE: 54 DEGREES
VENTRICULAR RATE- MUSE: 65 BPM

## 2023-03-13 ENCOUNTER — PATIENT OUTREACH (OUTPATIENT)
Dept: CARE COORDINATION | Facility: CLINIC | Age: 28
End: 2023-03-13

## 2023-03-13 NOTE — PROGRESS NOTES
Clinic Care Coordination Contact  Program: [facility]   County:  [facility] Case #:  [facility] Worker:   [place] #:   Subscriber #:   Renewal:  Date Applied:     FRW Outreach:   3/13/23: FRW called pt but unable to leave VM. FRW will make another outreach in 1-2 weeks.   2/23/23: FRW called pt. FRW emailed the laura care application due to pt is having issues with her mail. FRW will check billing notes in 20 days.   2/9/23: FRW called pt. Pt sent in laura care application. FRW and pt will connect in 2 weeks.  1/11/23: FRW called pt. Pt's SNAP is approved. FRW will get the rest of verifications in and FRW and pt will connect in 3-4 weeks.   12/21/22: FRW called pt and emailed the applications for pt to complete. FRW will reach out to pt in 3 weeks.     Health Insurance:      Referral/Screening:  Financial Resource Worker Screening     [facility] Benefits  Is patient requesting help applying for county benefits?: Yes (SNAP and Cash Assistance)  Have you recently applied for any [facility] benefits?: Yes  What was applied for? : SNAP  Application date:: September 2022- Denied  How many people in your household?: 4  Do you buy/eat food together?: Yes  What is the monthly gross income for the household (wages, social security, workers comp, and pension)? : 0 (Pt's  recently lost his job- pt is on a leave with no benefits or income.)     [place]:  [place] Yes  Is this an insurance renewal?: No  Is this a new insurance application request?:  (unsure)     Any other information for the FRW?: Patient has outstanding [facility] bills.     GIANCRALO Savage  Clinic Care Coordination  [facility] Clinics: [place], and [facility]  Phone: [phone]

## 2023-03-23 ENCOUNTER — PATIENT OUTREACH (OUTPATIENT)
Dept: CARE COORDINATION | Facility: CLINIC | Age: 28
End: 2023-03-23

## 2023-03-23 NOTE — PROGRESS NOTES
Clinic Care Coordination Contact  [facility]/Voicemail       Clinical Data: Care Coordinator Outreach  Outreach attempted x 1.  Left message on patient's voicemail with call back information and requested return call.  Plan: Care Coordinator will try to reach patient again in 1 month.    Nohelia Iniguez  [facility]  Clinic Care Coordinator  [facility]  [phone]  [email or web address]

## 2023-03-27 ENCOUNTER — PATIENT OUTREACH (OUTPATIENT)
Dept: CARE COORDINATION | Facility: CLINIC | Age: 28
End: 2023-03-27

## 2023-03-27 NOTE — PROGRESS NOTES
Clinic Care Coordination Contact  Program: [facility] [employer]/[facility]   County:  [facility] Case #:  [facility] Worker:   [place] #:   Subscriber #:   Renewal:  Date Applied:     FRW Outreach:   3/27/23: FRW called pt but unable to leave VM. FRW will make another outreach in 1-2 weeks.   3/13/23: FRW called pt but unable to leave VM. FRW will make another outreach in 1-2 weeks.   2/23/23: FRW called pt. FRW emailed the laura care application due to pt is having issues with her mail. FRW will check billing notes in 20 days.   2/9/23: FRW called pt. Pt sent in laura care application. FRW and pt will connect in 2 weeks.  1/11/23: FRW called pt. Pt's SNAP is approved. FRW will get the rest of verifications in and FRW and pt will connect in 3-4 weeks.   12/21/22: FRW called pt and emailed the applications for pt to complete. FRW will reach out to pt in 3 weeks.     Health Insurance:      Referral/Screening:  Financial Resource Worker Screening     [facility] Benefits  Is patient requesting help applying for [facility] benefits?: Yes (SNAP and Cash Assistance)  Have you recently applied for any [facility] benefits?: Yes  What was applied for? : SNAP  Application date:: September 2022- Denied  How many people in your household?: 4  Do you buy/eat food together?: Yes  What is the monthly gross income for the household (wages, social security, workers comp, and pension)? : 0 (Pt's  recently lost his job- pt is on a leave with no benefits or income.)     Ins[place]:  [place] Yes  Is this an insurance renewal?: No  Is this a new insurance application request?:  (unsure)     Any other information for the FRW?: Patient has outstanding [facility] bills.     GIANCARLO Savage  Clinic Care Coordination  [facility] Clinics: [place], [place], [place], and [facility]  Phone: [phone]   Wear knee immobilizer at all times until follow-up with orthopedics.  Take ibuprofen and Tylenol for pain.  Call the orthopedic specialist tomorrow morning for an appointment to be seen in the office.  Avoid any further trauma to your right knee.

## 2023-04-07 ENCOUNTER — PATIENT OUTREACH (OUTPATIENT)
Dept: CARE COORDINATION | Facility: CLINIC | Age: 28
End: 2023-04-07

## 2023-04-07 NOTE — PROGRESS NOTES
Clinic Care Coordination Contact  Program: [facility]   County:  [facility] Case #:  [facility] Worker:   [place] #:   Subscriber #:   Renewal:  Date Applied:     FRW Outreach:   4/7/23: FRW called pt. Pt is returning to work next week and is going to make payments on her bills. She is not interested in que care anymore.   3/27/23: FRW called pt but unable to leave VM. FRW will make another outreach in 1-2 weeks.   3/13/23: FRW called pt but unable to leave VM. FRW will make another outreach in 1-2 weeks.   2/23/23: FRW called pt. FRW emailed the que care application due to pt is having issues with her mail. FRW will check billing notes in 20 days.   2/9/23: FRW called pt. Pt sent in que care application. FRW and pt will connect in 2 weeks.  1/11/23: FRW called pt. Pt's SNAP is approved. FRW will get the rest of verifications in and FRW and pt will connect in 3-4 weeks.   12/21/22: FRW called pt and emailed the applications for pt to complete. FRW will reach out to pt in 3 weeks.     Health Insurance:      Referral/Screening:  Financial Resource Worker Screening     [facility] Benefits  Is patient requesting help applying for [facility] benefits?: Yes (SNAP and Cash Assistance)  Have you recently applied for any [facility] benefits?: Yes  What was applied for? : SNAP  Application date:: September 2022- Denied  How many people in your household?: 4  Do you buy/eat food together?: Yes  What is the monthly gross income for the household (wages, social security, workers comp, and pension)? : 0 (Pt's  recently lost his job- pt is on a leave with no benefits or income.)     Ins[place]:  [place] Yes  Is this an insurance renewal?: No  Is this a new insurance application request?:  (unsure)     Any other information for the FRW?: Patient has outstanding [facility] bills.     Claudia Gore, GIANCARLO  Clinic Care Coordination  [facility] Clinics: [place], [place], [facility], and  [place] [facility]  Phone: [phone]

## 2023-04-09 ENCOUNTER — HEALTH MAINTENANCE LETTER (OUTPATIENT)
Age: 28
End: 2023-04-09

## 2023-04-17 ENCOUNTER — PRENATAL OFFICE VISIT (OUTPATIENT)
Dept: OBGYN | Facility: CLINIC | Age: 28
End: 2023-04-17
Payer: COMMERCIAL

## 2023-04-17 VITALS
DIASTOLIC BLOOD PRESSURE: 64 MMHG | WEIGHT: 188 LBS | HEIGHT: 63 IN | BODY MASS INDEX: 33.31 KG/M2 | SYSTOLIC BLOOD PRESSURE: 102 MMHG

## 2023-04-17 PROBLEM — O09.90 SUPERVISION OF HIGH-RISK PREGNANCY: Status: RESOLVED | Noted: 2022-08-15 | Resolved: 2023-04-17

## 2023-04-17 PROCEDURE — 99207 PR POST PARTUM EXAM: CPT | Performed by: STUDENT IN AN ORGANIZED HEALTH CARE EDUCATION/TRAINING PROGRAM

## 2023-04-17 NOTE — PROGRESS NOTES
"[facility]  OB/GYN Clinic Note    SUBJECTIVE:  Deborah Swartz,  is here for a postpartum visit.  She had a repeat  section  on 23 delivering a healthy baby boy, named Luis weighing 7 lbs 12 oz at term at 39 weeks    HPI:  Recovering well. No questions or concerns. Mood is ok. Incision is healed. Eager to go back to work.       Delivery complications: No  Breast feeding:  Yes  Bladder problems:  No  Bowel problems/hemorrhoids:  No  Episiotomy/laceration/incision healed?  incision  Vaginal flow:  None  [place]:  No  Contraception: tubal ligation  Emotional adjustment:  doing well  Back to work:  Plans asap    Last PHQ-9 score on record=       2023     8:24 PM   PHQ-9 SCORE   PHQ-9 Total Score MyChart 5 (Mild depression)   PHQ-9 Total Score 5         2020    10:00 AM 2022    11:52 AM 10/23/2022    12:49 PM   JASIEL-7 SCORE   Total Score  3 (minimal anxiety) 4 (minimal anxiety)   Total Score 3 3 4       Pap:   Lab Results   Component Value Date    PAP NIL, NEG-HPV 2022    PAP ASC-US, NEG-HPV 2016       OBJECTIVE:  Vitals: /64   Ht 1.6 m (5' 3\")   Wt 85.3 kg (188 lb)   LMP 2022 (Approximate)   Breastfeeding Yes   BMI 33.30 kg/m    BMI= Body mass index is 33.3 kg/m .  General - pleasant female in no acute distress.  Breast -  deferred  Abdomen - Incision well-healed  Pelvic - Deferred  Rectovaginal - deferred.    ASSESSMENT:    ICD-10-CM    1. Routine postpartum follow-up  Z39.2           PLAN:  May resume normal activities without restrictions. Paperwork signed.   S/p salpingectomy for contraception.   Has follow-up for mood symptoms with therapist.   Pap smear was not done today, due .   Full counseling was provided, and all questions were answered.   Return to clinic in one year for an annual visit.     Mary Giles MD      "

## 2023-04-28 ENCOUNTER — PATIENT OUTREACH (OUTPATIENT)
Dept: CARE COORDINATION | Facility: CLINIC | Age: 28
End: 2023-04-28

## 2023-04-28 NOTE — PROGRESS NOTES
Clinic Care Coordination Contact  [facility]/Voicemail       Clinical Data: Care Coordinator Outreach  Outreach attempted x 2.  Unable to leave a voicemail because voicemail has not been set up yet.   Plan: Care Coordinator will try to reach patient again in 1 month.    Nohelia Iniguez  [facility]  Clinic Care Coordinator  [facility]  [phone]  [email or web address]

## 2023-05-23 ENCOUNTER — MEDICAL CORRESPONDENCE (OUTPATIENT)
Dept: HEALTH INFORMATION MANAGEMENT | Facility: CLINIC | Age: 28
End: 2023-05-23

## 2023-05-26 ENCOUNTER — PATIENT OUTREACH (OUTPATIENT)
Dept: CARE COORDINATION | Facility: CLINIC | Age: 28
End: 2023-05-26

## 2023-05-26 NOTE — PROGRESS NOTES
"Clinic Care Coordination Contact  [facility]/Voicemail       Clinical Data: Care Coordinator Outreach  Outreach attempted x 3. Unable to leave voicemail, \"voicemail has not been set up\".  Plan: Care Coordinator will send unable to contact letter with care coordinator contact information via mail. Care Coordinator will do no further outreaches at this time.    Nohelia Iniguez,  [facility]  Clinic Care Coordinator  [facility]  [phone]  [email or web address]        "

## 2023-05-26 NOTE — LETTER
[facility] CARE COORDINATION    May 26, 2023    Deborah Swartz  [address] [place] [address]      Dear Deborah,    I have been attempting to reach you since our last contact. I would like to continue to work with you and provide any additional support you may need on achieving your health care related goals. I would appreciate if you would give me a call at [phone] to let me know if you would like to continue working together. I know that there are many things that can affect our ability to communicate and I hope we can continue to work together.    All of us at the [facility] are invested in your health and are here to assist you in meeting your goals.     Sincerely,    BRUCE NunoSW

## 2023-07-20 ENCOUNTER — PATIENT OUTREACH (OUTPATIENT)
Dept: FAMILY MEDICINE | Facility: CLINIC | Age: 28
End: 2023-07-20
Payer: COMMERCIAL

## 2023-07-20 DIAGNOSIS — R87.610 PAPANICOLAOU SMEAR OF CERVIX WITH ATYPICAL SQUAMOUS CELLS OF UNDETERMINED SIGNIFICANCE (ASC-US): Primary | ICD-10-CM

## 2023-09-10 ENCOUNTER — HEALTH MAINTENANCE LETTER (OUTPATIENT)
Age: 28
End: 2023-09-10

## 2023-09-12 ENCOUNTER — OFFICE VISIT (OUTPATIENT)
Dept: FAMILY MEDICINE | Facility: CLINIC | Age: 28
End: 2023-09-12
Payer: COMMERCIAL

## 2023-09-12 VITALS
TEMPERATURE: 97.9 F | OXYGEN SATURATION: 100 % | HEIGHT: 63 IN | DIASTOLIC BLOOD PRESSURE: 88 MMHG | RESPIRATION RATE: 9 BRPM | HEART RATE: 87 BPM | BODY MASS INDEX: 35.17 KG/M2 | WEIGHT: 198.5 LBS | SYSTOLIC BLOOD PRESSURE: 136 MMHG

## 2023-09-12 DIAGNOSIS — F33.1 MODERATE RECURRENT MAJOR DEPRESSION (H): ICD-10-CM

## 2023-09-12 DIAGNOSIS — M62.81 MUSCLE WEAKNESS (GENERALIZED): Primary | ICD-10-CM

## 2023-09-12 PROBLEM — O24.410 DIET CONTROLLED GESTATIONAL DIABETES MELLITUS (GDM), ANTEPARTUM: Status: ACTIVE | Noted: 2023-01-26

## 2023-09-12 LAB
ERYTHROCYTE [DISTWIDTH] IN BLOOD BY AUTOMATED COUNT: 12.8 % (ref 10–15)
HBA1C MFR BLD: 5.7 % (ref 0–5.6)
HCT VFR BLD AUTO: 46.5 % (ref 35–47)
HGB BLD-MCNC: 15.2 G/DL (ref 11.7–15.7)
MCH RBC QN AUTO: 28.3 PG (ref 26.5–33)
MCHC RBC AUTO-ENTMCNC: 32.7 G/DL (ref 31.5–36.5)
MCV RBC AUTO: 86 FL (ref 78–100)
PLATELET # BLD AUTO: 211 10E3/UL (ref 150–450)
RBC # BLD AUTO: 5.38 10E6/UL (ref 3.8–5.2)
WBC # BLD AUTO: 11.1 10E3/UL (ref 4–11)

## 2023-09-12 PROCEDURE — 36415 COLL VENOUS BLD VENIPUNCTURE: CPT | Performed by: INTERNAL MEDICINE

## 2023-09-12 PROCEDURE — 90471 IMMUNIZATION ADMIN: CPT | Performed by: INTERNAL MEDICINE

## 2023-09-12 PROCEDURE — 90686 IIV4 VACC NO PRSV 0.5 ML IM: CPT | Performed by: INTERNAL MEDICINE

## 2023-09-12 PROCEDURE — 85027 COMPLETE CBC AUTOMATED: CPT | Performed by: INTERNAL MEDICINE

## 2023-09-12 PROCEDURE — 83036 HEMOGLOBIN GLYCOSYLATED A1C: CPT | Performed by: INTERNAL MEDICINE

## 2023-09-12 PROCEDURE — 84443 ASSAY THYROID STIM HORMONE: CPT | Performed by: INTERNAL MEDICINE

## 2023-09-12 PROCEDURE — 99214 OFFICE O/P EST MOD 30 MIN: CPT | Mod: 25 | Performed by: INTERNAL MEDICINE

## 2023-09-12 ASSESSMENT — PATIENT HEALTH QUESTIONNAIRE - PHQ9
SUM OF ALL RESPONSES TO PHQ QUESTIONS 1-9: 10
SUM OF ALL RESPONSES TO PHQ QUESTIONS 1-9: 10
10. IF YOU CHECKED OFF ANY PROBLEMS, HOW DIFFICULT HAVE THESE PROBLEMS MADE IT FOR YOU TO DO YOUR WORK, TAKE CARE OF THINGS AT HOME, OR GET ALONG WITH OTHER PEOPLE: SOMEWHAT DIFFICULT

## 2023-09-12 ASSESSMENT — PAIN SCALES - GENERAL: PAINLEVEL: NO PAIN (0)

## 2023-09-12 NOTE — PATIENT INSTRUCTIONS
(M62.81) Muscle weakness (generalized)  (primary encounter diagnosis)  Comment: We will check labs today including TSH, complete blood counts and hemoglobin A1c and recommend follow up with primary care physician in the next few weeks to review.    Plan: HEMOGLOBIN A1C, REVIEW OF HEALTH MAINTENANCE         PROTOCOL ORDERS, INFLUENZA VACCINE IM > 6         MONTHS VALENT IIV4 (AFLURIA/FLUZONE), Adult         Mental Health  Referral, CBC with         platelets, sertraline (ZOLOFT) 50 MG tablet,         TSH with free T4 reflex            (F33.1) Moderate recurrent major depression (H)  Comment: OK to reduce dose of sertraline, to 50 mg and resume as you have not been taking for the past month.  Recommend referral for long term psychiatry as well as continue follow up in psychology.    Plan: HEMOGLOBIN A1C, REVIEW OF HEALTH MAINTENANCE         PROTOCOL ORDERS, INFLUENZA VACCINE IM > 6         MONTHS VALENT IIV4 (AFLURIA/FLUZONE), Adult         Mental Health  Referral, CBC with         platelets, sertraline (ZOLOFT) 50 MG tablet,         TSH with free T4 reflex

## 2023-09-12 NOTE — PROGRESS NOTES
deaSubjective   Deborah is a 28 year old, presenting for the following health issues:  Thyroid Problem      History of Present Illness       Hypothyroidism:     Since last visit, patient describes the following symptoms::  Anxiety, Constipation, Depression, Dry skin, Fatigue and Tremors    She eats 0-1 servings of fruits and vegetables daily.She consumes 3 sweetened beverage(s) daily.She exercises with enough effort to increase her heart rate 60 or more minutes per day.  She exercises with enough effort to increase her heart rate 4 days per week. She is missing 7 dose(s) of medications per week.     Dizziness  Fatigue   Deborah Swartz has had symptoms of dizziness and fatigue over the past 2 years.  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  She fell at work and fortunately suffered no injury.   She did see a neurologist.  She was advised by neurology that she did not have seizure, and no brain pathology.  Since then she has been taking meclizine to help with dizziness as needed and it persists periodically.  She also has noted worsening fatigue and is interested to see if possibly she may have thyroid abnormalities that might explain her symptoms.    Anxiety   She has had depression for the majority of her life.  She has not had any history of anxiety until recently.  Noted that over the past year she has been anxious all of the time.  She does take sertraline.  She is following with a therapist.  She is not following with psychiatry.  She has stopped sertraline as unable to get refills due to lack of transportation.  Has been off x 3 weeks.  She would like to resume at a lower dose.  She is postpartum x 6 months and has had a prior suicide attempt at age 18, but denies suicidal ideation today.        Review of Systems   Constitutional +  "fatigue, HEENT + dizziness, cardiovascular, pulmonary, GI, , musculoskeletal + weakness, neuro, skin, endocrine and psych + anxiety and depression as above systems are negative, except as otherwise noted.      Objective    /88 (BP Location: Left arm, Patient Position: Sitting, Cuff Size: Adult Regular)   Pulse 87   Temp 97.9  F (36.6  C) (Oral)   Resp (!) 9   Ht 1.6 m (5' 3\")   Wt 90 kg (198 lb 8 oz)   SpO2 100%   Breastfeeding Yes   BMI 35.16 kg/m    Body mass index is 35.16 kg/m .  Physical Exam   GENERAL: healthy, alert and no distress  EYES: Eyes grossly normal to inspection,    HENT: ear canals and TM's normal,    NECK: no adenopathy, no asymmetry, masses, or scars and thyroid normal to palpation  RESP: lungs clear to auscultation - no rales, rhonchi or wheezes  CV: regular rate and rhythm, normal S1 S2, no S3 or S4, no murmur, click or rub, no peripheral edema   ABDOMEN: soft, nontender, no hepatosplenomegaly,   MS: no gross musculoskeletal defects noted, no edema  SKIN: no suspicious lesions or rashes  NEURO: Normal strength and tone, mentation intact and speech normal  PSYCH: mentation appears normal, affect normal/bright    Results for orders placed or performed in visit on 09/12/23   HEMOGLOBIN A1C     Status: Abnormal   Result Value Ref Range    Hemoglobin A1C 5.7 (H) 0.0 - 5.6 %   CBC with platelets     Status: Abnormal   Result Value Ref Range    WBC Count 11.1 (H) 4.0 - 11.0 10e3/uL    RBC Count 5.38 (H) 3.80 - 5.20 10e6/uL    Hemoglobin 15.2 11.7 - 15.7 g/dL    Hematocrit 46.5 35.0 - 47.0 %    MCV 86 78 - 100 fL    MCH 28.3 26.5 - 33.0 pg    MCHC 32.7 31.5 - 36.5 g/dL    RDW 12.8 10.0 - 15.0 %    Platelet Count 211 150 - 450 10e3/uL      Patient Instructions   (M62.81) Muscle weakness (generalized)  (primary encounter diagnosis)  Comment: We will check labs today including TSH, complete blood counts and hemoglobin A1c and recommend follow up with primary care physician in the next few " weeks to review.    Plan: HEMOGLOBIN A1C, REVIEW OF HEALTH MAINTENANCE         PROTOCOL ORDERS, INFLUENZA VACCINE IM > 6         MONTHS VALENT IIV4 (AFLURIA/FLUZONE), Adult         Mental Health  Referral, CBC with         platelets, sertraline (ZOLOFT) 50 MG tablet,         TSH with free T4 reflex            (F33.1) Moderate recurrent major depression (H)  Comment: OK to reduce dose of sertraline, to 50 mg and resume as you have not been taking for the past month.  Recommend referral for long term psychiatry as well as continue follow up in psychology.    Plan: HEMOGLOBIN A1C, REVIEW OF HEALTH MAINTENANCE         PROTOCOL ORDERS, INFLUENZA VACCINE IM > 6         MONTHS VALENT IIV4 (AFLURIA/FLUZONE), Adult         Mental Health  Referral, CBC with         platelets, sertraline (ZOLOFT) 50 MG tablet,         TSH with free T4 reflex

## 2023-09-13 LAB — TSH SERPL DL<=0.005 MIU/L-ACNC: 0.82 UIU/ML (ref 0.3–4.2)

## 2023-09-13 NOTE — RESULT ENCOUNTER NOTE
Dakota Cabrera,    I have had the opportunity to review your recent results and an interpretation is as follows:  Your follow-up blood counts show stable elevated white blood cell count which is nonspecific, but follow-up is recommended to review  Your thyroid function returned stable  And your A1c also shows stable elevated glucose and indicating that you do not have diabetes, but do have a propensity for diabetes and a discussion with nutritionist could be offered    Please follow-up with your primary care provider as planned    Sincerely,  Micheal Rose MD

## 2023-10-18 ENCOUNTER — VIRTUAL VISIT (OUTPATIENT)
Dept: FAMILY MEDICINE | Facility: CLINIC | Age: 28
End: 2023-10-18
Payer: COMMERCIAL

## 2023-10-18 DIAGNOSIS — R73.03 PREDIABETES: ICD-10-CM

## 2023-10-18 DIAGNOSIS — M62.81 MUSCLE WEAKNESS (GENERALIZED): Primary | ICD-10-CM

## 2023-10-18 PROCEDURE — 99213 OFFICE O/P EST LOW 20 MIN: CPT | Mod: VID | Performed by: INTERNAL MEDICINE

## 2023-10-18 ASSESSMENT — ASTHMA QUESTIONNAIRES: ACT_TOTALSCORE: 23

## 2023-10-18 ASSESSMENT — PATIENT HEALTH QUESTIONNAIRE - PHQ9: SUM OF ALL RESPONSES TO PHQ QUESTIONS 1-9: 13

## 2023-10-18 NOTE — PROGRESS NOTES
"Deborah is a 28 year old who is being evaluated via a billable video visit.      How would you like to obtain your AVS? MyChart  If the video visit is dropped, the invitation should be resent by: Other e-mail: [employer]  Will anyone else be joining your video visit? No      Assessment & Plan     Muscle weakness (generalized)  Repeat this in one month.  - WBC count; Future    Prediabetes  To discuss about diet to prevent progression to diabetes.  - Nutrition Referral; Future     BMI:   Estimated body mass index is 35.16 kg/m  as calculated from the following:    Height as of 9/12/23: 1.6 m (5' 3\").    Weight as of 9/12/23: 90 kg (198 lb 8 oz).       See Patient Instructions    Katie Perez MD  [facility]    Sg Cabrera is a 28 year old, presenting for the following health issues:  Results    Deborah is here to discuss test results.   She had blood work in September 2023 with another provider and they sent Deborah a [employer] message explaining the results however she did not see the message   I reviewed the results with her.   She has prediabetes  Slightly elevated wbc count.   Normal TSH    History of Present Illness       Reason for visit:  Test results    She eats 2-3 servings of fruits and vegetables daily.She consumes 3 sweetened beverage(s) daily.She exercises with enough effort to increase her heart rate 60 or more minutes per day.  She exercises with enough effort to increase her heart rate 4 days per week.   She is taking medications regularly.       Review of Systems       Objective       Vitals:  No vitals were obtained today due to virtual visit.    Physical Exam   GEN: No acute distress  RESP: No audible increased work of breathing. Patient speaking in full sentences without distress.  PSYCH: pleasant  Exam otherwise limited due to virtual platform        Video-Visit Details    Type of service:  Video Visit   Video Start Time:  4: 24 pm  Video End Time:4:34 PM  Originating Location (pt. " Location): Home  Distant Location (provider location):  On-site  Platform used for Video Visit: Phillip

## 2023-10-26 NOTE — TELEPHONE ENCOUNTER
Dr. Perez,    Patient is lost to pap tracking follow-up. Attempts to contact pt have been made per reminder process and there has been no reply and/or no appt scheduled.      Ingrid Winter RN  Pap Tracking     9/22/16: ASCUS Pap. Plan pap in 1 year.   10/30/17 Lost to follow-up for pap tracking  8/9/22 NIL Pap, Neg HPV. Plan pap in 1 year.   8/17/2022 Pt viewed result on [email or web address].  7/20/2023 Reminder MyChart  8/18/2023 Reminder call - spoke to pt  9/28/23 appt - added a note - canceled  10/26/2023 Lost to follow-up for pap tracking

## 2023-12-13 NOTE — ADDENDUM NOTE
Addended by: RONDA WALLACE on: 12/23/2022 01:08 PM     Modules accepted: Orders     Quality 130: Documentation Of Current Medications In The Medical Record: Current Medications Documented Detail Level: Detailed Quality 431: Preventive Care And Screening: Unhealthy Alcohol Use - Screening: Patient not identified as an unhealthy alcohol user when screened for unhealthy alcohol use using a systematic screening method Quality 226: Preventive Care And Screening: Tobacco Use: Screening And Cessation Intervention: Tobacco Screening not Performed

## 2024-05-28 ENCOUNTER — RESULTS ONLY (OUTPATIENT)
Dept: ADMINISTRATIVE | Facility: CLINIC | Age: 29
End: 2024-05-28

## 2024-05-28 ENCOUNTER — HOSPITAL ENCOUNTER (EMERGENCY)
Facility: CLINIC | Age: 29
Discharge: HOME | End: 2024-05-29
Attending: EMERGENCY MEDICINE | Admitting: EMERGENCY MEDICINE
Payer: MEDICAID

## 2024-05-28 VITALS
RESPIRATION RATE: 14 BRPM | WEIGHT: 200 LBS | BODY MASS INDEX: 35.44 KG/M2 | HEART RATE: 87 BPM | HEIGHT: 63 IN | DIASTOLIC BLOOD PRESSURE: 79 MMHG | TEMPERATURE: 98.1 F | SYSTOLIC BLOOD PRESSURE: 127 MMHG | OXYGEN SATURATION: 100 %

## 2024-05-28 DIAGNOSIS — R53.1 GENERALIZED WEAKNESS: ICD-10-CM

## 2024-05-28 LAB
ACANTHOCYTES BLD QL SMEAR: ABNORMAL
ALBUMIN SERPL BCG-MCNC: 4.3 G/DL (ref 3.5–5.2)
ALP SERPL-CCNC: 94 U/L (ref 40–150)
ALT SERPL W P-5'-P-CCNC: 67 U/L (ref 0–50)
ANION GAP SERPL CALCULATED.3IONS-SCNC: 12 MMOL/L (ref 7–15)
AST SERPL W P-5'-P-CCNC: 49 U/L (ref 0–45)
ATRIAL RATE - MUSE: 77 BPM
ATRIAL RATE - MUSE: 85 BPM
AUER BODIES BLD QL SMEAR: ABNORMAL
BASO STIPL BLD QL SMEAR: ABNORMAL
BASOPHILS # BLD AUTO: 0 10E3/UL (ref 0–0.2)
BASOPHILS NFR BLD AUTO: 1 %
BILIRUB SERPL-MCNC: 0.5 MG/DL
BITE CELLS BLD QL SMEAR: ABNORMAL
BLISTER CELLS BLD QL SMEAR: ABNORMAL
BUN SERPL-MCNC: 6.9 MG/DL (ref 6–20)
BURR CELLS BLD QL SMEAR: ABNORMAL
CALCIUM SERPL-MCNC: 9.3 MG/DL (ref 8.6–10)
CHLORIDE SERPL-SCNC: 102 MMOL/L (ref 98–107)
CREAT SERPL-MCNC: 0.68 MG/DL (ref 0.51–0.95)
DACRYOCYTES BLD QL SMEAR: ABNORMAL
DEPRECATED HCO3 PLAS-SCNC: 26 MMOL/L (ref 22–29)
DIASTOLIC BLOOD PRESSURE - MUSE: NORMAL MMHG
DIASTOLIC BLOOD PRESSURE - MUSE: NORMAL MMHG
EGFRCR SERPLBLD CKD-EPI 2021: >90 ML/MIN/1.73M2
ELLIPTOCYTES BLD QL SMEAR: ABNORMAL
EOSINOPHIL # BLD AUTO: 0.3 10E3/UL (ref 0–0.7)
EOSINOPHIL NFR BLD AUTO: 4 %
ERYTHROCYTE [DISTWIDTH] IN BLOOD BY AUTOMATED COUNT: 13.7 % (ref 10–15)
FRAGMENTS BLD QL SMEAR: ABNORMAL
GLUCOSE SERPL-MCNC: 121 MG/DL (ref 70–99)
HCG SERPL QL: NEGATIVE
HCT VFR BLD AUTO: 40.3 % (ref 35–47)
HGB BLD-MCNC: 13.2 G/DL (ref 11.7–15.7)
HGB C CRYSTALS: ABNORMAL
HOLD SPECIMEN: NORMAL
HOWELL-JOLLY BOD BLD QL SMEAR: ABNORMAL
IMM GRANULOCYTES # BLD: 0 10E3/UL
IMM GRANULOCYTES NFR BLD: 0 %
INTERPRETATION ECG - MUSE: NORMAL
INTERPRETATION ECG - MUSE: NORMAL
LYMPHOCYTES # BLD AUTO: 2.6 10E3/UL (ref 0.8–5.3)
LYMPHOCYTES NFR BLD AUTO: 30 %
MCH RBC QN AUTO: 28.8 PG (ref 26.5–33)
MCHC RBC AUTO-ENTMCNC: 32.8 G/DL (ref 31.5–36.5)
MCV RBC AUTO: 88 FL (ref 78–100)
MONOCYTES # BLD AUTO: 0.6 10E3/UL (ref 0–1.3)
MONOCYTES NFR BLD AUTO: 7 %
NEUTROPHILS # BLD AUTO: 4.9 10E3/UL (ref 1.6–8.3)
NEUTROPHILS NFR BLD AUTO: 58 %
NEUTS HYPERSEG BLD QL SMEAR: ABNORMAL
NRBC # BLD AUTO: 0 10E3/UL
NRBC BLD AUTO-RTO: 0 /100
P AXIS - MUSE: 37 DEGREES
P AXIS - MUSE: 42 DEGREES
PLAT MORPH BLD: ABNORMAL
PLATELET # BLD AUTO: NORMAL 10*3/UL
POLYCHROMASIA BLD QL SMEAR: ABNORMAL
POTASSIUM SERPL-SCNC: 3.4 MMOL/L (ref 3.4–5.3)
PR INTERVAL - MUSE: 150 MS
PR INTERVAL - MUSE: 158 MS
PROT SERPL-MCNC: 7.8 G/DL (ref 6.4–8.3)
QRS DURATION - MUSE: 86 MS
QRS DURATION - MUSE: 86 MS
QT - MUSE: 374 MS
QT - MUSE: 374 MS
QTC - MUSE: 423 MS
QTC - MUSE: 445 MS
R AXIS - MUSE: 28 DEGREES
R AXIS - MUSE: 33 DEGREES
RBC # BLD AUTO: 4.59 10E6/UL (ref 3.8–5.2)
RBC AGGLUT BLD QL: ABNORMAL
RBC MORPH BLD: ABNORMAL
ROULEAUX BLD QL SMEAR: ABNORMAL
SICKLE CELLS BLD QL SMEAR: ABNORMAL
SMUDGE CELLS BLD QL SMEAR: ABNORMAL
SODIUM SERPL-SCNC: 140 MMOL/L (ref 135–145)
SPHEROCYTES BLD QL SMEAR: ABNORMAL
STOMATOCYTES BLD QL SMEAR: ABNORMAL
SYSTOLIC BLOOD PRESSURE - MUSE: NORMAL MMHG
SYSTOLIC BLOOD PRESSURE - MUSE: NORMAL MMHG
T AXIS - MUSE: 53 DEGREES
T AXIS - MUSE: 72 DEGREES
TARGETS BLD QL SMEAR: ABNORMAL
TOXIC GRANULES BLD QL SMEAR: ABNORMAL
VARIANT LYMPHS BLD QL SMEAR: ABNORMAL
VENTRICULAR RATE- MUSE: 77 BPM
VENTRICULAR RATE- MUSE: 85 BPM
WBC # BLD AUTO: 8.4 10E3/UL (ref 4–11)

## 2024-05-28 PROCEDURE — 80053 COMPREHEN METABOLIC PANEL: CPT | Performed by: EMERGENCY MEDICINE

## 2024-05-28 PROCEDURE — 99284 EMERGENCY DEPT VISIT MOD MDM: CPT

## 2024-05-28 PROCEDURE — 85041 AUTOMATED RBC COUNT: CPT | Performed by: EMERGENCY MEDICINE

## 2024-05-28 PROCEDURE — 36415 COLL VENOUS BLD VENIPUNCTURE: CPT | Performed by: EMERGENCY MEDICINE

## 2024-05-28 PROCEDURE — 84703 CHORIONIC GONADOTROPIN ASSAY: CPT | Performed by: EMERGENCY MEDICINE

## 2024-05-28 PROCEDURE — 93005 ELECTROCARDIOGRAM TRACING: CPT

## 2024-05-28 ASSESSMENT — COLUMBIA-SUICIDE SEVERITY RATING SCALE - C-SSRS
1. IN THE PAST MONTH, HAVE YOU WISHED YOU WERE DEAD OR WISHED YOU COULD GO TO SLEEP AND NOT WAKE UP?: NO
2. HAVE YOU ACTUALLY HAD ANY THOUGHTS OF KILLING YOURSELF IN THE PAST MONTH?: NO
6. HAVE YOU EVER DONE ANYTHING, STARTED TO DO ANYTHING, OR PREPARED TO DO ANYTHING TO END YOUR LIFE?: NO

## 2024-05-28 ASSESSMENT — ACTIVITIES OF DAILY LIVING (ADL): ADLS_ACUITY_SCORE: 35

## 2024-05-29 ASSESSMENT — ACTIVITIES OF DAILY LIVING (ADL)
ADLS_ACUITY_SCORE: 35
ADLS_ACUITY_SCORE: 35

## 2024-05-29 NOTE — ED PROVIDER NOTES
"  Emergency Department Note      History of Present Illness     Chief Complaint  Generalized Weakness    HPI  Deborah Swartz is a 28 year old female with history of prediabetes who presents to the ED with her  and kids for evaluation of generalized weakness. Patient reports onset of symptoms a week before her most recent pregnancy. States the weakness begins on her right side as she has baseline tendonitis and radiates from the right upper extremity down the lower extremity. The weakness later occurs on the left side also radiating down from the upper to lower extremity. Deborah notes that going from sitting to standing causes her to fall to the floor. She has tried to \"sleep off\" the symptoms but wakes up very fatigued.The episodes of numbness and weakness come on suddenly with no particular exacerbating factors. Reports tingling, numbness, dizziness, and nausea. No loss of consciousness or headache. She has seen a neurologist and other doctors for these symptoms previously, and they have ruled out epilepsy or diabetes complication. She notes they discovered a slight arrhythmia. Patient reports she is getting some strength back and the tingling has improved.             Independent Historian  None as detailed above.    Review of External Notes  23 patient seen by Dr. Alonso for similar symptoms.  Past Medical History   Medical History and Problem List  Asthma  Depressive disorder  Mental disorder  Postpartum depression  Vitamin D deficiency   Vitamin B12 deficiency   Migraine  Anemia   Prediabetes    Medications  Meclizine  Sertraline     Surgical History    x2  Bilateral salpingectomy   Physical Exam   Patient Vitals for the past 24 hrs:   BP Temp Temp src Pulse Resp SpO2 Height Weight   24 2207 127/79 98.1  F (36.7  C) Temporal 87 14 100 % 1.6 m (5' 3\") 90.7 kg (200 lb)     Physical Exam  General: alert, lying comfortably on gurney, accompanied by  and three young children.  HENT: " mucous membranes moist  CV: regular rate, regular rhythm  Resp: normal effort, clear throughout, no crackles or wheezing  GI: abdomen soft and nontender, no guarding  MSK: no bony tenderness  Skin: appropriately warm and dry  Extremities: no edema, calves non-tender  Neuro:     Speech is fluent, cognition is normal.     EOMI, symmetric grimace.     Str 5/5 in RUE, LUE, RLE, LLE.     Fine touch sensation intact in BUE/BLE.   Normal muscle tone.   Intermittent spasms to right thigh.   No clonus to BLE.  Psych: normal mood and affect    Diagnostics   Lab Results   Labs Ordered and Resulted from Time of ED Arrival to Time of ED Departure   COMPREHENSIVE METABOLIC PANEL - Abnormal       Result Value    Sodium 140      Potassium 3.4      Carbon Dioxide (CO2) 26      Anion Gap 12      Urea Nitrogen 6.9      Creatinine 0.68      GFR Estimate >90      Calcium 9.3      Chloride 102      Glucose 121 (*)     Alkaline Phosphatase 94      AST 49 (*)     ALT 67 (*)     Protein Total 7.8      Albumin 4.3      Bilirubin Total 0.5     RBC AND PLATELET MORPHOLOGY - Abnormal    Platelet Assessment Platelets Clumped (*)     Acanthocytes        Irwin Rods        Basophilic Stippling        Bite Cells        Blister Cells        Tete Cells        Elliptocytes        Hgb C Crystals        Santos-Jolly Bodies        Hypersegmented Neutrophils        Polychromasia        RBC agglutination        RBC Fragments        Reactive Lymphocytes        Rouleaux        Sickle Cells        Smudge Cells        Spherocytes        Stomatocytes        Target Cells        Teardrop Cells        Toxic Neutrophils        RBC Morphology Confirmed RBC Indices     HCG QUALITATIVE PREGNANCY - Normal    hCG Serum Qualitative Negative     CBC WITH PLATELETS AND DIFFERENTIAL    WBC Count 8.4      RBC Count 4.59      Hemoglobin 13.2      Hematocrit 40.3      MCV 88      MCH 28.8      MCHC 32.8      RDW 13.7      Platelet Count        % Neutrophils 58      % Lymphocytes  30      % Monocytes 7      % Eosinophils 4      % Basophils 1      % Immature Granulocytes 0      NRBCs per 100 WBC 0      Absolute Neutrophils 4.9      Absolute Lymphocytes 2.6      Absolute Monocytes 0.6      Absolute Eosinophils 0.3      Absolute Basophils 0.0      Absolute Immature Granulocytes 0.0      Absolute NRBCs 0.0         Imaging  No orders to display       EKG   ECG results from 05/28/24   EKG 12-lead, tracing only     Value    Systolic Blood Pressure     Diastolic Blood Pressure     Ventricular Rate 85    Atrial Rate 85    OH Interval 158    QRS Duration 86        QTc 445    P Axis 37    R AXIS 28    T Axis 72    Interpretation ECG      Sinus rhythm with sinus arrhythmia  Cannot rule out Anterior infarct , age undetermined  Abnormal ECG  When compared with ECG of 02-MAR-2023 08:37,  No significant change was found           Independent Interpretation  None  ED Course    Medications Administered  Medications - No data to display    Procedures  Procedures     Discussion of Management  None    Social Determinants of Health adding to complexity of care  None    ED Course  ED Course as of 05/29/24 0315   Tue May 28, 2024   2344 I obtained history and examined the patient as noted above.    Wed May 29, 2024   0120 Patient able to ambulate without assistance.     Medical Decision Making / Diagnosis   CMS Diagnoses: None    MIPS     None    MDM  Deborah Swartz is a 28 year old female who presents with generalized weakness.  Patient states that the symptoms have come and gone for several years, and typically follow the pattern that she describes tonight.  On my exam, she was feeling that her symptoms were starting to wane.  We continue to observe her in the ED until she was able to ambulate safely.  Labs are unremarkable, and vitals are normal.  For me, she has no focal findings on exam.  At this point, I do not suspect a dangerous condition, including seizure, myasthenia gravis, acute electrolyte  abnormality, Guillain-Barré, botulinum toxin, or other dangerous process.  Symptoms are well on their way to resolving, which is the typical pattern for this presentation.  I encouraged follow-up with neurology, specifically neuromuscular specialist.  Patient notes that she may not be staying in [place] for a long, but does not provide additional details.  Return to the ED for recurrence of symptoms, especially if associated with shortness of breath, or with any trauma.    Disposition  The patient was discharged.     ICD-10 Codes:    ICD-10-CM    1. Generalized weakness  R53.1 Adult Neurology  Referral           Scribe Disclosure:  I, Melaniejason Floresgordon, am serving as a scribe at 11:57 PM on 5/28/2024 to document services personally performed by Alisa Trevino MD based on my observations and the provider's statements to me.        Alisa Trevino MD  05/29/24 0326     - - -

## 2024-05-29 NOTE — ED TRIAGE NOTES
Patient states onset of right sided weakness.  Weakness radiates from right arm to right leg.  Weakness also in left arm to left leg.  Usually has dizziness or nausea prior to this.  Denies symptoms today.

## 2024-05-29 NOTE — DISCHARGE INSTRUCTIONS
Cause of your symptoms today is unclear.  I do recommend follow-up with a neurologist, and have placed a referral for this.  Your liver tests were very slightly elevated.  I do not think this is related to your symptoms today.  I recommend having this rechecked with primary care doctor in the next week.  In the meantime, I recommend avoiding medications that have acetaminophen or Tylenol.  Avoid alcohol as well, as this can worsen liver inflammation.

## 2024-06-16 ENCOUNTER — HEALTH MAINTENANCE LETTER (OUTPATIENT)
Age: 29
End: 2024-06-16

## 2024-06-25 ENCOUNTER — OFFICE VISIT (OUTPATIENT)
Dept: FAMILY MEDICINE | Facility: CLINIC | Age: 29
End: 2024-06-25
Payer: MEDICAID

## 2024-06-25 VITALS
DIASTOLIC BLOOD PRESSURE: 64 MMHG | HEART RATE: 93 BPM | TEMPERATURE: 99.4 F | SYSTOLIC BLOOD PRESSURE: 96 MMHG | OXYGEN SATURATION: 97 %

## 2024-06-25 DIAGNOSIS — Z20.2 STD EXPOSURE: Primary | ICD-10-CM

## 2024-06-25 DIAGNOSIS — Z86.19 HISTORY OF GENITAL WARTS: ICD-10-CM

## 2024-06-25 LAB
HBV SURFACE AG SERPL QL IA: NONREACTIVE
HCV AB SERPL QL IA: NONREACTIVE
HIV 1+2 AB+HIV1 P24 AG SERPL QL IA: NONREACTIVE

## 2024-06-25 PROCEDURE — 87491 CHLMYD TRACH DNA AMP PROBE: CPT | Performed by: PHYSICIAN ASSISTANT

## 2024-06-25 PROCEDURE — 86803 HEPATITIS C AB TEST: CPT | Performed by: PHYSICIAN ASSISTANT

## 2024-06-25 PROCEDURE — 99213 OFFICE O/P EST LOW 20 MIN: CPT

## 2024-06-25 PROCEDURE — 86780 TREPONEMA PALLIDUM: CPT | Performed by: PHYSICIAN ASSISTANT

## 2024-06-25 PROCEDURE — 87591 N.GONORRHOEAE DNA AMP PROB: CPT | Performed by: PHYSICIAN ASSISTANT

## 2024-06-25 PROCEDURE — 87389 HIV-1 AG W/HIV-1&-2 AB AG IA: CPT | Performed by: PHYSICIAN ASSISTANT

## 2024-06-25 PROCEDURE — 36415 COLL VENOUS BLD VENIPUNCTURE: CPT

## 2024-06-25 PROCEDURE — 87340 HEPATITIS B SURFACE AG IA: CPT | Performed by: PHYSICIAN ASSISTANT

## 2024-06-25 ASSESSMENT — ENCOUNTER SYMPTOMS
RESPIRATORY NEGATIVE: 1
CARDIOVASCULAR NEGATIVE: 1
CONSTITUTIONAL NEGATIVE: 1

## 2024-06-25 NOTE — PROGRESS NOTES
Assessment & Plan       ICD-10-CM    1. STD exposure  Z20.2 NEISSERIA GONORRHOEA PCR     CHLAMYDIA TRACHOMATIS PCR     Treponema Abs w Reflex to RPR and Titer     HIV Antigen Antibody Combo     Hepatitis C Screen Reflex to HCV RNA Quant and Genotype     Hepatitis B surface antigen     Hepatitis B surface antigen     Treponema Abs w Reflex to RPR and Titer     HIV Antigen Antibody Combo     Hepatitis C Screen Reflex to HCV RNA Quant and Genotype      2. History of genital warts  Z86.19 NEISSERIA GONORRHOEA PCR     CHLAMYDIA TRACHOMATIS PCR     Treponema Abs w Reflex to RPR and Titer     HIV Antigen Antibody Combo     Hepatitis C Screen Reflex to HCV RNA Quant and Genotype     Hepatitis B surface antigen     Hepatitis B surface antigen     Treponema Abs w Reflex to RPR and Titer     HIV Antigen Antibody Combo     Hepatitis C Screen Reflex to HCV RNA Quant and Genotype           STD testing pending. Discussed history and treatment for if any is positive.     Follow up with primary care provider with any problems, questions or concerns or if symptoms worsen or fail to improve. Patient agreed to plan and verbalized understanding.     Subjective     Deborah is a 28 year old female who presents to clinic today with partner for the following health issues:  Chief Complaint   Patient presents with    Urgent Care    STD     Pt just wants get her STI screening     Deborah presents with reports of STD exposure in previous partner. She denies current symptoms except warts which she has had previously. She reports she has history of tubal ligation.             Review of Systems   Constitutional: Negative.    Respiratory: Negative.     Cardiovascular: Negative.        Problem List:  2023-10: Prediabetes  2023: Supervision of high risk pregnancy in third trimester  2023: Previous  delivery, antepartum  2023: Diet controlled gestational diabetes mellitus (GDM),   antepartum  2022: Iron deficiency anemia during  pregnancy  2022-11: Muscle weakness (generalized)  2022-11: Low lying placenta nos or without hemorrhage, second   trimester  2022-11: Circumvallate placenta in second trimester  2022-08: Supervision of high-risk pregnancy  2022-08: Encounter for insertion of mirena IUD  2022-08: Encounter for pregnancy test, result unknown  2022-08: Cervical cancer screening  2022-08: Vaginal yeast infection  2022-06: Other migraine without status migrainosus, not intractable  2022-06: Fatigue, unspecified type  2022-06: Mild episode of recurrent major depressive disorder (H24)  2022-06: Dizziness  2022-06: Encounter for IUD insertion  2022-06: Vitamin D deficiency  2022-06: Vitamin B 12 deficiency  2017-03: Tobacco use disorder  2016-09: Papanicolaou smear of cervix with atypical squamous cells of   undetermined significance (ASC-US)  2015-05: Mild intermittent asthma  2011-04: Atypical depressive disease      Past Medical History:   Diagnosis Date    Asthma     Depressive disorder N/A    Sometime in [facility]    Diabetes (H)     [facility]    Mental disorder     Papanicolaou smear of cervix with atypical squamous cells of undetermined significance (ASC-US) 09/22/2016    Postpartum depression        Social History     Tobacco Use    Smoking status: Every Day     Current packs/day: 0.00     Average packs/day: 0.5 packs/day for 7.6 years (3.8 ttl pk-yrs)     Types: Cigarettes     Start date: 1/1/2013     Last attempt to quit: 8/17/2020     Years since quitting: 3.8    Smokeless tobacco: Former    Tobacco comments:     Smoked for 5years, quit cold turkey for both pregnancies, picked it up again upon returning to work.   Substance Use Topics    Alcohol use: Not Currently     Comment: none           Objective    BP 96/64   Pulse 93   Temp 99.4  F (37.4  C) (Tympanic)   LMP 06/10/2024   SpO2 97%   Physical Exam  Constitutional:       Appearance: Normal appearance.   HENT:      Head: Normocephalic and atraumatic.   Musculoskeletal:       Cervical back: Normal range of motion and neck supple.   Skin:     General: Skin is warm and dry.   Neurological:      General: No focal deficit present.      Mental Status: She is alert and oriented to person, place, and time.   Psychiatric:         Mood and Affect: Mood normal.         Behavior: Behavior normal.         Thought Content: Thought content normal.         Judgment: Judgment normal.              Mando Macias PA-C

## 2024-06-26 LAB
C TRACH DNA SPEC QL NAA+PROBE: NEGATIVE
N GONORRHOEA DNA SPEC QL NAA+PROBE: NEGATIVE
T PALLIDUM AB SER QL: NONREACTIVE

## 2024-07-29 ENCOUNTER — ANCILLARY PROCEDURE (OUTPATIENT)
Dept: GENERAL RADIOLOGY | Facility: CLINIC | Age: 29
End: 2024-07-29
Attending: FAMILY MEDICINE
Payer: MEDICAID

## 2024-07-29 ENCOUNTER — OFFICE VISIT (OUTPATIENT)
Dept: ORTHOPEDICS | Facility: CLINIC | Age: 29
End: 2024-07-29
Payer: MEDICAID

## 2024-07-29 VITALS — BODY MASS INDEX: 35.43 KG/M2 | HEIGHT: 63 IN

## 2024-07-29 DIAGNOSIS — M25.511 CHRONIC RIGHT SHOULDER PAIN: ICD-10-CM

## 2024-07-29 DIAGNOSIS — M54.12 CHRONIC CERVICAL RADICULOPATHY: ICD-10-CM
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
## 2024-07-29 DIAGNOSIS — G89.29 CHRONIC RIGHT SHOULDER PAIN: ICD-10-CM

## 2024-07-29 DIAGNOSIS — M54.12 CHRONIC CERVICAL RADICULOPATHY: Primary | ICD-10-CM

## 2024-07-29 PROCEDURE — 72040 X-RAY EXAM NECK SPINE 2-3 VW: CPT | Mod: TC | Performed by: RADIOLOGY

## 2024-07-29 PROCEDURE — 99214 OFFICE O/P EST MOD 30 MIN: CPT | Performed by: FAMILY MEDICINE

## 2024-07-29 PROCEDURE — 73030 X-RAY EXAM OF SHOULDER: CPT | Mod: TC | Performed by: RADIOLOGY

## 2024-07-29 RX ORDER — CYCLOBENZAPRINE HCL 10 MG
10 TABLET ORAL
Qty: 30 TABLET | Refills: 0 | Status: SHIPPED | OUTPATIENT
Start: 2024-07-29

## 2024-07-29 RX ORDER — METHYLPREDNISOLONE 4 MG
TABLET, DOSE PACK ORAL
Qty: 21 TABLET | Refills: 0 | Status: SHIPPED | OUTPATIENT
Start: 2024-07-29

## 2024-07-29 ASSESSMENT — PAIN SCALES - GENERAL: PAINLEVEL: SEVERE PAIN (7)

## 2024-07-29 NOTE — PROGRESS NOTES
ASSESSMENT & PLAN    Deobrah was seen today for pain.    Diagnoses and all orders for this visit:    Chronic cervical radiculopathy  -     XR Cervical Spine 2/3 vws; Future  -     methylPREDNISolone (MEDROL DOSEPAK) 4 MG tablet therapy pack; Follow Package Directions  -     Physical Therapy  Referral; Future  -     cyclobenzaprine (FLEXERIL) 10 MG tablet; Take 1 tablet (10 mg) by mouth nightly as needed for muscle spasms    Work related injury  -     XR Cervical Spine 2/3 vws; Future  -     XR Shoulder Right G/E 3 Views; Future  -     methylPREDNISolone (MEDROL DOSEPAK) 4 MG tablet therapy pack; Follow Package Directions  -     Physical Therapy  Referral; Future  -     cyclobenzaprine (FLEXERIL) 10 MG tablet; Take 1 tablet (10 mg) by mouth nightly as needed for muscle spasms    Chronic right shoulder pain  -     XR Shoulder Right G/E 3 Views; Future        # Chronic Right Cervical Radiculopathy, Work Relate Injury: Deborah Swartz  was seen today for right shoulder pain. Symptoms had been going on for since 6/2020 after a work related injury. On examination there are positive findings of tenderness to palpation over the right cervical region, pain with right cervical nerve compression. Imaging findings showed reversal of normal cervical curvature, normal right shoulder x-rays. Likely cause of patient's condition due to flare of cervical radiculopathy related to previous work injury. Other possible conditions contributing to symptoms include irritated rotator cuff tendon.  Counseled patient on nature of condition and treatment options.  Given this plan as below, follow-up one month as needed     Image Findings: loss of normal cervical curvature, negative right shoulder x-rays  Treatment: Activities as tolerated, home exercises given today, referral to PT  Job: As tolerated  Medications/Injections: Limited tylenol/ibuprofen for pain for 1-2 weeks, medrol dosepack, flexeril at night no co-sleeping,  "none  Follow-up: In one month if symptoms do not improve, sooner if worsening  Can consider cervical MRI possible epidural steroid injection     -----    SUBJECTIVE:  Deborah Swartz is a 29 year old female who is seen in follow-up for right shoulder WC injury. They were last seen 6/30/22 by Dr. Dixon.  DOI 6/2020, now 4 years out.  The patient is seen with their 3 kids.    Since their last visit reports returned right shoulder pain over the last year.  Pain in anterior, posterior shoulder and neck pain radiating to elbow.   They indicate that their current pain level is 7/10. They have tried shoulder compression, ibuprofen, HEP.        Patient's past medical, surgical, social, and family histories were reviewed today and no changes are noted.    REVIEW OF SYSTEMS:  Constitutional: NEGATIVE for fever, chills, change in weight  Skin: NEGATIVE for worrisome rashes, moles or lesions  GI/: NEGATIVE for bowel or bladder changes  Neuro: NEGATIVE for weakness, dizziness or paresthesias    OBJECTIVE:  Ht 1.6 m (5' 3\")   LMP 06/10/2024   BMI 35.43 kg/m     General: healthy, alert and in no distress  HEENT: no scleral icterus or conjunctival erythema  Skin: no suspicious lesions or rash. No jaundice.  CV: regular rhythm by palpation, no pedal edema  Resp: normal respiratory effort without conversational dyspnea   Psych: normal mood and affect  Gait: normal steady gait with appropriate coordination and balance  Neuro: normal light touch sensory exam of the extremities.    MSK:    RIGHT SHOULDER  Inspection:    no swelling, bruising, discoloration, or obvious deformity or asymmetry  Palpation:  Nontender.  Active Range of Motion:     Abduction 1800, FF 1800, , IR L1.       Strength:    Scapular plane abduction 5/5,  ER 5/5, IR 5/5, biceps 5/5, triceps 5/5  Special Tests:    Positive: None    Negative: Neer's, Gastelum', and supraspinatus (empty can)    CERVICAL SPINE  Inspection:    normal cervical lordosis present, " rounded shoulders, forward head posture  Palpation:    TTP over right paracervical muscles.  Range of Motion:     Flexion full    Extension full    Right side bend full    Left side bend full    Right rotation full    Left rotation full  Strength:    Full strength throughout all neck muscles  Special Tests:    Positive: Spurling's right    Negative: Spurling's (left)    Independent visualization of the below image:     Loss of normal cervical curvature  Normal right shoulder x-rays    Ja Dacosta MD, [facility]    Disclaimer: This note consists of symbols derived from keyboarding, dictation and/or voice recognition software. As a result, there may be errors in the script that have gone undetected. Please consider this when interpreting information found in this chart.

## 2024-07-29 NOTE — LETTER
7/29/2024      Deborah Swartz  [address] [address]      Dear Colleague,    Thank you for referring your patient, Deborah Swartz, to the [facility] [place]. Please see a copy of my visit note below.    ASSESSMENT & PLAN    Deborah was seen today for pain.    Diagnoses and all orders for this visit:    Chronic cervical radiculopathy  -     XR Cervical Spine 2/3 vws; Future  -     methylPREDNISolone (MEDROL DOSEPAK) 4 MG tablet therapy pack; Follow Package Directions  -     Physical Therapy  Referral; Future  -     cyclobenzaprine (FLEXERIL) 10 MG tablet; Take 1 tablet (10 mg) by mouth nightly as needed for muscle spasms    Work related injury  -     XR Cervical Spine 2/3 vws; Future  -     XR Shoulder Right G/E 3 Views; Future  -     methylPREDNISolone (MEDROL DOSEPAK) 4 MG tablet therapy pack; Follow Package Directions  -     Physical Therapy  Referral; Future  -     cyclobenzaprine (FLEXERIL) 10 MG tablet; Take 1 tablet (10 mg) by mouth nightly as needed for muscle spasms    Chronic right shoulder pain  -     XR Shoulder Right G/E 3 Views; Future        # Chronic Right Cervical Radiculopathy, Work Relate Injury: Deborah Swartz  was seen today for right shoulder pain. Symptoms had been going on for since 6/2020 after a work related injury. On examination there are positive findings of tenderness to palpation over the right cervical region, pain with right cervical nerve compression. Imaging findings showed reversal of normal cervical curvature, normal right shoulder x-rays. Likely cause of patient's condition due to flare of cervical radiculopathy related to previous work injury. Other possible conditions contributing to symptoms include irritated rotator cuff tendon.  Counseled patient on nature of condition and treatment options.  Given this plan as below, follow-up one month as needed     Image Findings: loss of normal cervical curvature, negative  "right shoulder x-rays  Treatment: Activities as tolerated, home exercises given today, referral to PT  Job: As tolerated  Medications/Injections: Limited tylenol/ibuprofen for pain for 1-2 weeks, medrol dosepack, flexeril at night no co-sleeping, none  Follow-up: In one month if symptoms do not improve, sooner if worsening  Can consider cervical MRI possible epidural steroid injection     -----    SUBJECTIVE:  Deborah Swartz is a 29 year old female who is seen in follow-up for right shoulder WC injury. They were last seen 6/30/22 by Dr. Dixon.  DOI 6/2020, now 4 years out.  The patient is seen with their 3 kids.    Since their last visit reports returned right shoulder pain over the last year.  Pain in anterior, posterior shoulder and neck pain radiating to elbow.   They indicate that their current pain level is 7/10. They have tried shoulder compression, ibuprofen, HEP.        Patient's past medical, surgical, social, and family histories were reviewed today and no changes are noted.    REVIEW OF SYSTEMS:  Constitutional: NEGATIVE for fever, chills, change in weight  Skin: NEGATIVE for worrisome rashes, moles or lesions  GI/: NEGATIVE for bowel or bladder changes  Neuro: NEGATIVE for weakness, dizziness or paresthesias    OBJECTIVE:  Ht 1.6 m (5' 3\")   LMP 06/10/2024   BMI 35.43 kg/m     General: healthy, alert and in no distress  HEENT: no scleral icterus or conjunctival erythema  Skin: no suspicious lesions or rash. No jaundice.  CV: regular rhythm by palpation, no pedal edema  Resp: normal respiratory effort without conversational dyspnea   Psych: normal mood and affect  Gait: normal steady gait with appropriate coordination and balance  Neuro: normal light touch sensory exam of the extremities.    MSK:    RIGHT SHOULDER  Inspection:    no swelling, bruising, discoloration, or obvious deformity or asymmetry  Palpation:  Nontender.  Active Range of Motion:     Abduction 1800, FF 1800, , IR L1.     "   Strength:    Scapular plane abduction 5/5,  ER 5/5, IR 5/5, biceps 5/5, triceps 5/5  Special Tests:    Positive: None    Negative: Neer's, Gastelum', and supraspinatus (empty can)    CERVICAL SPINE  Inspection:    normal cervical lordosis present, rounded shoulders, forward head posture  Palpation:    TTP over right paracervical muscles.  Range of Motion:     Flexion full    Extension full    Right side bend full    Left side bend full    Right rotation full    Left rotation full  Strength:    Full strength throughout all neck muscles  Special Tests:    Positive: Spurling's right    Negative: Spurling's (left)    Independent visualization of the below image:     Loss of normal cervical curvature  Normal right shoulder x-rays    Ja Dacosta MD, [facility]    Disclaimer: This note consists of symbols derived from keyboarding, dictation and/or voice recognition software. As a result, there may be errors in the script that have gone undetected. Please consider this when interpreting information found in this chart.      Again, thank you for allowing me to participate in the care of your patient.        Sincerely,        Ja Dacosta MD

## 2024-07-29 NOTE — PATIENT INSTRUCTIONS
# Chronic Right Cervical Radiculopathy, Work Relate Injury: Deborah Swartz  was seen today for right shoulder pain. Symptoms had been going on for since 6/2020 after a work related injury. On examination there are positive findings of tenderness to palpation over the right cervical region, pain with right cervical nerve compression. Imaging findings showed reversal of normal cervical curvature, normal right shoulder x-rays. Likely cause of patient's condition due to flare of cervical radiculopathy related to previous work injury. Other possible conditions contributing to symptoms include irritated rotator cuff tendon.  Counseled patient on nature of condition and treatment options.  Given this plan as below, follow-up one month as needed     Image Findings: loss of normal cervical curvature, negative right shoulder x-rays  Treatment: Activities as tolerated, home exercises given today, referral to PT  Job: As tolerated  Medications/Injections: Limited tylenol/ibuprofen for pain for 1-2 weeks, medrol dosepack, flexeril at night no co-sleeping, none  Follow-up: In one month if symptoms do not improve, sooner if worsening  Can consider cervical MRI possible epidural steroid injection     Please call [phone]   Ask for my team if you have any questions or concerns    If you have not yet received the influenza vaccine but would like to get one, please call  [phone] or you can schedule via [employer]    It was great seeing you today!    Ja Dacosta MD, [facility]

## 2024-07-29 NOTE — LETTER
July 29, 2024      Deborah Swartz  [address] [address]        To Whom It May Concern:    Deborah Swartz  was seen on ***.  Please excuse her  until *** due to {WORK EXCUSE:672320}.        Sincerely,        Ja Dacosta MD

## 2025-01-19 NOTE — PLAN OF CARE
Pt discharged home in stable condition. Pt given discharge paperwork and instructions. Pt states understanding and denies further questions.   no

## 2025-06-21 ENCOUNTER — HEALTH MAINTENANCE LETTER (OUTPATIENT)
Age: 30
End: 2025-06-21

## (undated) DEVICE — SOL WATER IRRIG 1000ML BOTTLE 07139-09

## (undated) DEVICE — PACK SET-UP STD 9102

## (undated) DEVICE — ESU GROUND PAD UNIVERSAL W/O CORD

## (undated) DEVICE — LINEN BABY BLANKET 5434

## (undated) DEVICE — DRAPE SHEET REV FOLD 3/4 9349

## (undated) DEVICE — ESU LIGASURE DISSECTOR EXACT LF2019

## (undated) DEVICE — BLADE CLIPPER 4406

## (undated) DEVICE — DRSG TELFA ISLAND 4X10"

## (undated) DEVICE — PAD CHUX UNDERPAD 23X24" 7136

## (undated) DEVICE — STPL SKIN PROXIMATE 35 WIDE PMW35

## (undated) DEVICE — PREP CHLORAPREP 26ML TINTED ORANGE  260815

## (undated) DEVICE — DRSG KERLIX FLUFFS X5

## (undated) DEVICE — LINEN TOWEL PACK X5 5464

## (undated) DEVICE — SURGICEL POWDER ABSORBABLE HEMOSTAT 3GM 3013SP

## (undated) DEVICE — SOL NACL 0.9% IRRIG 1000ML BOTTLE 07138-09

## (undated) DEVICE — SPONGE LAP 18X18" X8435

## (undated) DEVICE — SU MONOCRYL 0 CT-1 36" UND Y946H

## (undated) DEVICE — LIGHT HANDLE X2

## (undated) DEVICE — SUCTION CANISTER MEDIVAC LINER 3000ML W/LID 65651-530

## (undated) DEVICE — PACK C-SECTION LF PL15OTA83B

## (undated) RX ORDER — ONDANSETRON 2 MG/ML
INJECTION INTRAMUSCULAR; INTRAVENOUS
Status: DISPENSED
Start: 2023-03-01

## (undated) RX ORDER — OXYTOCIN/0.9 % SODIUM CHLORIDE 30/500 ML
PLASTIC BAG, INJECTION (ML) INTRAVENOUS
Status: DISPENSED
Start: 2023-03-01

## (undated) RX ORDER — MORPHINE SULFATE 1 MG/ML
INJECTION, SOLUTION EPIDURAL; INTRATHECAL; INTRAVENOUS
Status: DISPENSED
Start: 2023-03-01

## (undated) RX ORDER — TRANEXAMIC ACID 10 MG/ML
INJECTION, SOLUTION INTRAVENOUS
Status: DISPENSED
Start: 2023-03-01